# Patient Record
Sex: MALE | Race: BLACK OR AFRICAN AMERICAN | NOT HISPANIC OR LATINO
[De-identification: names, ages, dates, MRNs, and addresses within clinical notes are randomized per-mention and may not be internally consistent; named-entity substitution may affect disease eponyms.]

---

## 2017-04-24 ENCOUNTER — APPOINTMENT (OUTPATIENT)
Dept: INTERNAL MEDICINE | Facility: CLINIC | Age: 62
End: 2017-04-24

## 2017-04-24 VITALS
OXYGEN SATURATION: 97 % | HEART RATE: 81 BPM | DIASTOLIC BLOOD PRESSURE: 86 MMHG | BODY MASS INDEX: 33.46 KG/M2 | RESPIRATION RATE: 14 BRPM | HEIGHT: 71 IN | SYSTOLIC BLOOD PRESSURE: 139 MMHG | WEIGHT: 239 LBS | TEMPERATURE: 98.1 F

## 2017-04-24 DIAGNOSIS — R10.9 UNSPECIFIED ABDOMINAL PAIN: ICD-10-CM

## 2017-04-25 LAB
ALBUMIN SERPL ELPH-MCNC: 4.2 G/DL
ALP BLD-CCNC: 60 U/L
ALT SERPL-CCNC: 17 U/L
ANION GAP SERPL CALC-SCNC: 18 MMOL/L
AST SERPL-CCNC: 18 U/L
BASOPHILS # BLD AUTO: 0.07 K/UL
BASOPHILS NFR BLD AUTO: 1.1 %
BILIRUB SERPL-MCNC: 0.6 MG/DL
BUN SERPL-MCNC: 18 MG/DL
CALCIUM SERPL-MCNC: 9.5 MG/DL
CHLORIDE SERPL-SCNC: 101 MMOL/L
CHOLEST SERPL-MCNC: 156 MG/DL
CHOLEST/HDLC SERPL: 3.5 RATIO
CO2 SERPL-SCNC: 25 MMOL/L
CREAT SERPL-MCNC: 0.83 MG/DL
EOSINOPHIL # BLD AUTO: 0.13 K/UL
EOSINOPHIL NFR BLD AUTO: 2 %
GLUCOSE SERPL-MCNC: 80 MG/DL
HBA1C MFR BLD HPLC: 5.9 %
HCT VFR BLD CALC: 40.7 %
HDLC SERPL-MCNC: 45 MG/DL
HGB BLD-MCNC: 14.1 G/DL
IMM GRANULOCYTES NFR BLD AUTO: 0.2 %
LDLC SERPL CALC-MCNC: 96 MG/DL
LYMPHOCYTES # BLD AUTO: 2.84 K/UL
LYMPHOCYTES NFR BLD AUTO: 43.6 %
MAN DIFF?: NORMAL
MCHC RBC-ENTMCNC: 31.4 PG
MCHC RBC-ENTMCNC: 34.6 GM/DL
MCV RBC AUTO: 90.6 FL
MONOCYTES # BLD AUTO: 0.6 K/UL
MONOCYTES NFR BLD AUTO: 9.2 %
NEUTROPHILS # BLD AUTO: 2.87 K/UL
NEUTROPHILS NFR BLD AUTO: 43.9 %
PLATELET # BLD AUTO: 400 K/UL
POTASSIUM SERPL-SCNC: 4.4 MMOL/L
PROT SERPL-MCNC: 7.7 G/DL
RBC # BLD: 4.49 M/UL
RBC # FLD: 16.6 %
SODIUM SERPL-SCNC: 144 MMOL/L
TRIGL SERPL-MCNC: 74 MG/DL
TSH SERPL-ACNC: 1.35 UIU/ML
WBC # FLD AUTO: 6.52 K/UL

## 2017-06-13 ENCOUNTER — APPOINTMENT (OUTPATIENT)
Dept: INTERNAL MEDICINE | Facility: CLINIC | Age: 62
End: 2017-06-13

## 2017-08-23 ENCOUNTER — APPOINTMENT (OUTPATIENT)
Dept: INTERNAL MEDICINE | Facility: CLINIC | Age: 62
End: 2017-08-23
Payer: MEDICARE

## 2017-08-23 VITALS
DIASTOLIC BLOOD PRESSURE: 86 MMHG | SYSTOLIC BLOOD PRESSURE: 168 MMHG | BODY MASS INDEX: 37.38 KG/M2 | RESPIRATION RATE: 14 BRPM | WEIGHT: 267 LBS | TEMPERATURE: 98.5 F | OXYGEN SATURATION: 97 % | HEIGHT: 71 IN | HEART RATE: 82 BPM

## 2017-08-23 PROCEDURE — 99214 OFFICE O/P EST MOD 30 MIN: CPT | Mod: 25

## 2017-08-23 PROCEDURE — 36415 COLL VENOUS BLD VENIPUNCTURE: CPT

## 2017-08-24 LAB
ALBUMIN SERPL ELPH-MCNC: 4.1 G/DL
ALP BLD-CCNC: 59 U/L
ALT SERPL-CCNC: 23 U/L
ANION GAP SERPL CALC-SCNC: 15 MMOL/L
AST SERPL-CCNC: 23 U/L
BILIRUB SERPL-MCNC: 0.4 MG/DL
BUN SERPL-MCNC: 10 MG/DL
CALCIUM SERPL-MCNC: 9.3 MG/DL
CHLORIDE SERPL-SCNC: 104 MMOL/L
CHOLEST SERPL-MCNC: 197 MG/DL
CHOLEST/HDLC SERPL: 4.4 RATIO
CO2 SERPL-SCNC: 26 MMOL/L
CREAT SERPL-MCNC: 1.11 MG/DL
GLUCOSE SERPL-MCNC: 93 MG/DL
HBA1C MFR BLD HPLC: 6.2 %
HDLC SERPL-MCNC: 45 MG/DL
LDLC SERPL CALC-MCNC: 98 MG/DL
POTASSIUM SERPL-SCNC: 4.7 MMOL/L
PROT SERPL-MCNC: 7.1 G/DL
SODIUM SERPL-SCNC: 145 MMOL/L
TRIGL SERPL-MCNC: 270 MG/DL

## 2017-09-11 ENCOUNTER — APPOINTMENT (OUTPATIENT)
Dept: INTERNAL MEDICINE | Facility: CLINIC | Age: 62
End: 2017-09-11
Payer: MEDICARE

## 2017-09-11 VITALS
HEIGHT: 71 IN | DIASTOLIC BLOOD PRESSURE: 85 MMHG | TEMPERATURE: 98.6 F | RESPIRATION RATE: 16 BRPM | BODY MASS INDEX: 36.96 KG/M2 | HEART RATE: 75 BPM | SYSTOLIC BLOOD PRESSURE: 140 MMHG | WEIGHT: 264 LBS | OXYGEN SATURATION: 98 %

## 2017-09-11 PROCEDURE — 99214 OFFICE O/P EST MOD 30 MIN: CPT

## 2017-11-10 ENCOUNTER — OTHER (OUTPATIENT)
Age: 62
End: 2017-11-10

## 2017-12-04 ENCOUNTER — APPOINTMENT (OUTPATIENT)
Dept: INTERNAL MEDICINE | Facility: CLINIC | Age: 62
End: 2017-12-04
Payer: MEDICARE

## 2017-12-04 VITALS
RESPIRATION RATE: 18 BRPM | SYSTOLIC BLOOD PRESSURE: 133 MMHG | HEIGHT: 71 IN | TEMPERATURE: 98.2 F | DIASTOLIC BLOOD PRESSURE: 83 MMHG | HEART RATE: 85 BPM | OXYGEN SATURATION: 98 % | WEIGHT: 252 LBS | BODY MASS INDEX: 35.28 KG/M2

## 2017-12-04 PROCEDURE — 36415 COLL VENOUS BLD VENIPUNCTURE: CPT

## 2017-12-04 PROCEDURE — 99214 OFFICE O/P EST MOD 30 MIN: CPT | Mod: 25

## 2017-12-07 LAB
ANION GAP SERPL CALC-SCNC: 19 MMOL/L
BUN SERPL-MCNC: 19 MG/DL
CALCIUM SERPL-MCNC: 10.6 MG/DL
CHLORIDE SERPL-SCNC: 92 MMOL/L
CO2 SERPL-SCNC: 29 MMOL/L
CREAT SERPL-MCNC: 1.47 MG/DL
CRP SERPL-MCNC: 1.5 MG/DL
ERYTHROCYTE [SEDIMENTATION RATE] IN BLOOD BY WESTERGREN METHOD: 21 MM/HR
GLUCOSE SERPL-MCNC: 148 MG/DL
HBA1C MFR BLD HPLC: 6.4 %
POTASSIUM SERPL-SCNC: 3.8 MMOL/L
SODIUM SERPL-SCNC: 140 MMOL/L

## 2018-01-16 ENCOUNTER — APPOINTMENT (OUTPATIENT)
Dept: INTERNAL MEDICINE | Facility: CLINIC | Age: 63
End: 2018-01-16
Payer: MEDICARE

## 2018-01-16 VITALS
HEIGHT: 71 IN | BODY MASS INDEX: 35.42 KG/M2 | WEIGHT: 253 LBS | DIASTOLIC BLOOD PRESSURE: 91 MMHG | OXYGEN SATURATION: 98 % | SYSTOLIC BLOOD PRESSURE: 139 MMHG | TEMPERATURE: 98.3 F | HEART RATE: 98 BPM | RESPIRATION RATE: 16 BRPM

## 2018-01-16 DIAGNOSIS — R07.0 PAIN IN THROAT: ICD-10-CM

## 2018-01-16 DIAGNOSIS — R79.89 OTHER SPECIFIED ABNORMAL FINDINGS OF BLOOD CHEMISTRY: ICD-10-CM

## 2018-01-16 PROCEDURE — 36415 COLL VENOUS BLD VENIPUNCTURE: CPT

## 2018-01-16 PROCEDURE — 99214 OFFICE O/P EST MOD 30 MIN: CPT | Mod: 25

## 2018-01-18 ENCOUNTER — OTHER (OUTPATIENT)
Age: 63
End: 2018-01-18

## 2018-01-18 LAB
ALBUMIN SERPL ELPH-MCNC: 4.4 G/DL
ALP BLD-CCNC: 84 U/L
ALT SERPL-CCNC: 23 U/L
ANION GAP SERPL CALC-SCNC: 24 MMOL/L
AST SERPL-CCNC: 24 U/L
BILIRUB SERPL-MCNC: 0.6 MG/DL
BUN SERPL-MCNC: 11 MG/DL
CALCIUM SERPL-MCNC: 11.2 MG/DL
CHLORIDE SERPL-SCNC: 102 MMOL/L
CO2 SERPL-SCNC: 25 MMOL/L
CREAT SERPL-MCNC: 1.13 MG/DL
GLUCOSE SERPL-MCNC: 105 MG/DL
POTASSIUM SERPL-SCNC: 4.3 MMOL/L
PROT SERPL-MCNC: 8.5 G/DL
RHEUMATOID FACT SER QL: 8 IU/ML
SODIUM SERPL-SCNC: 151 MMOL/L

## 2018-01-19 LAB
ANA PAT FLD IF-IMP: ABNORMAL
ANA SER IF-ACNC: ABNORMAL

## 2018-02-20 ENCOUNTER — APPOINTMENT (OUTPATIENT)
Dept: INTERNAL MEDICINE | Facility: CLINIC | Age: 63
End: 2018-02-20
Payer: MEDICARE

## 2018-02-20 VITALS
BODY MASS INDEX: 36.26 KG/M2 | HEART RATE: 98 BPM | TEMPERATURE: 99 F | OXYGEN SATURATION: 96 % | SYSTOLIC BLOOD PRESSURE: 163 MMHG | HEIGHT: 71 IN | WEIGHT: 259 LBS | DIASTOLIC BLOOD PRESSURE: 101 MMHG

## 2018-02-20 VITALS — SYSTOLIC BLOOD PRESSURE: 160 MMHG | DIASTOLIC BLOOD PRESSURE: 94 MMHG

## 2018-02-20 PROCEDURE — 36415 COLL VENOUS BLD VENIPUNCTURE: CPT

## 2018-02-20 PROCEDURE — 99214 OFFICE O/P EST MOD 30 MIN: CPT | Mod: 25

## 2018-02-22 ENCOUNTER — OTHER (OUTPATIENT)
Age: 63
End: 2018-02-22

## 2018-02-22 LAB
ALBUMIN SERPL ELPH-MCNC: 4.3 G/DL
ALP BLD-CCNC: 65 U/L
ALT SERPL-CCNC: 17 U/L
ANION GAP SERPL CALC-SCNC: 20 MMOL/L
AST SERPL-CCNC: 19 U/L
BILIRUB SERPL-MCNC: 0.4 MG/DL
BUN SERPL-MCNC: 13 MG/DL
CALCIUM SERPL-MCNC: 10.5 MG/DL
CHLORIDE SERPL-SCNC: 100 MMOL/L
CHOLEST SERPL-MCNC: 278 MG/DL
CHOLEST/HDLC SERPL: 5.9 RATIO
CO2 SERPL-SCNC: 24 MMOL/L
CREAT SERPL-MCNC: 1.1 MG/DL
GLUCOSE SERPL-MCNC: 106 MG/DL
HBA1C MFR BLD HPLC: 6.3 %
HDLC SERPL-MCNC: 47 MG/DL
LDLC SERPL CALC-MCNC: 201 MG/DL
POTASSIUM SERPL-SCNC: 4.5 MMOL/L
PROT SERPL-MCNC: 8 G/DL
SODIUM SERPL-SCNC: 144 MMOL/L
TRIGL SERPL-MCNC: 151 MG/DL

## 2018-05-01 ENCOUNTER — OTHER (OUTPATIENT)
Age: 63
End: 2018-05-01

## 2018-05-30 ENCOUNTER — OTHER (OUTPATIENT)
Age: 63
End: 2018-05-30

## 2018-06-13 ENCOUNTER — APPOINTMENT (OUTPATIENT)
Dept: INTERNAL MEDICINE | Facility: CLINIC | Age: 63
End: 2018-06-13

## 2018-07-26 ENCOUNTER — OTHER (OUTPATIENT)
Age: 63
End: 2018-07-26

## 2018-07-26 DIAGNOSIS — N39.3 STRESS INCONTINENCE (FEMALE) (MALE): ICD-10-CM

## 2018-08-22 ENCOUNTER — RX RENEWAL (OUTPATIENT)
Age: 63
End: 2018-08-22

## 2018-08-29 ENCOUNTER — FORM ENCOUNTER (OUTPATIENT)
Age: 63
End: 2018-08-29

## 2018-08-29 ENCOUNTER — APPOINTMENT (OUTPATIENT)
Dept: INTERNAL MEDICINE | Facility: CLINIC | Age: 63
End: 2018-08-29
Payer: MEDICARE

## 2018-08-29 VITALS
DIASTOLIC BLOOD PRESSURE: 95 MMHG | SYSTOLIC BLOOD PRESSURE: 152 MMHG | HEART RATE: 87 BPM | TEMPERATURE: 98.9 F | HEIGHT: 71 IN | BODY MASS INDEX: 35.28 KG/M2 | OXYGEN SATURATION: 96 % | WEIGHT: 252 LBS

## 2018-08-29 PROCEDURE — 99215 OFFICE O/P EST HI 40 MIN: CPT | Mod: 25

## 2018-08-29 PROCEDURE — 93000 ELECTROCARDIOGRAM COMPLETE: CPT

## 2018-08-29 PROCEDURE — 36415 COLL VENOUS BLD VENIPUNCTURE: CPT

## 2018-08-30 ENCOUNTER — OUTPATIENT (OUTPATIENT)
Dept: OUTPATIENT SERVICES | Facility: HOSPITAL | Age: 63
LOS: 1 days | End: 2018-08-30
Payer: MEDICARE

## 2018-08-30 LAB
ALBUMIN SERPL ELPH-MCNC: 4.6 G/DL
ALP BLD-CCNC: 73 U/L
ALT SERPL-CCNC: 17 U/L
ANION GAP SERPL CALC-SCNC: 15 MMOL/L
AST SERPL-CCNC: 14 U/L
BASOPHILS # BLD AUTO: 0.05 K/UL
BASOPHILS NFR BLD AUTO: 0.8 %
BILIRUB SERPL-MCNC: 0.7 MG/DL
BUN SERPL-MCNC: 17 MG/DL
CALCIUM SERPL-MCNC: 10.4 MG/DL
CHLORIDE SERPL-SCNC: 92 MMOL/L
CHOLEST SERPL-MCNC: 163 MG/DL
CHOLEST/HDLC SERPL: 3.8 RATIO
CO2 SERPL-SCNC: 31 MMOL/L
CREAT SERPL-MCNC: 1.03 MG/DL
EOSINOPHIL # BLD AUTO: 0.12 K/UL
EOSINOPHIL NFR BLD AUTO: 1.8 %
GLUCOSE SERPL-MCNC: 88 MG/DL
HBA1C MFR BLD HPLC: 6.6 %
HCT VFR BLD CALC: 45.4 %
HDLC SERPL-MCNC: 43 MG/DL
HGB BLD-MCNC: 15.2 G/DL
IMM GRANULOCYTES NFR BLD AUTO: 0.2 %
LDLC SERPL CALC-MCNC: 93 MG/DL
LYMPHOCYTES # BLD AUTO: 3.33 K/UL
LYMPHOCYTES NFR BLD AUTO: 51.2 %
MAN DIFF?: NORMAL
MCHC RBC-ENTMCNC: 30.7 PG
MCHC RBC-ENTMCNC: 33.5 GM/DL
MCV RBC AUTO: 91.7 FL
MONOCYTES # BLD AUTO: 0.65 K/UL
MONOCYTES NFR BLD AUTO: 10 %
NEUTROPHILS # BLD AUTO: 2.34 K/UL
NEUTROPHILS NFR BLD AUTO: 36 %
PLATELET # BLD AUTO: 387 K/UL
POTASSIUM SERPL-SCNC: 3.9 MMOL/L
PROT SERPL-MCNC: 8.3 G/DL
RBC # BLD: 4.95 M/UL
RBC # FLD: 16.3 %
SODIUM SERPL-SCNC: 138 MMOL/L
TRIGL SERPL-MCNC: 135 MG/DL
WBC # FLD AUTO: 6.5 K/UL

## 2018-08-30 PROCEDURE — 71046 X-RAY EXAM CHEST 2 VIEWS: CPT

## 2018-08-30 PROCEDURE — 71046 X-RAY EXAM CHEST 2 VIEWS: CPT | Mod: 26

## 2018-09-06 ENCOUNTER — EMERGENCY (EMERGENCY)
Facility: HOSPITAL | Age: 63
LOS: 1 days | Discharge: HOME | End: 2018-09-06
Attending: EMERGENCY MEDICINE | Admitting: EMERGENCY MEDICINE
Payer: OTHER MISCELLANEOUS

## 2018-09-06 VITALS
OXYGEN SATURATION: 96 % | HEART RATE: 105 BPM | TEMPERATURE: 98 F | RESPIRATION RATE: 18 BRPM | SYSTOLIC BLOOD PRESSURE: 174 MMHG | WEIGHT: 259.04 LBS | DIASTOLIC BLOOD PRESSURE: 111 MMHG

## 2018-09-06 VITALS
OXYGEN SATURATION: 97 % | DIASTOLIC BLOOD PRESSURE: 93 MMHG | TEMPERATURE: 99 F | HEART RATE: 94 BPM | RESPIRATION RATE: 18 BRPM | SYSTOLIC BLOOD PRESSURE: 158 MMHG

## 2018-09-06 DIAGNOSIS — M10.9 GOUT, UNSPECIFIED: ICD-10-CM

## 2018-09-06 DIAGNOSIS — M79.641 PAIN IN RIGHT HAND: ICD-10-CM

## 2018-09-06 DIAGNOSIS — E78.5 HYPERLIPIDEMIA, UNSPECIFIED: ICD-10-CM

## 2018-09-06 DIAGNOSIS — I10 ESSENTIAL (PRIMARY) HYPERTENSION: ICD-10-CM

## 2018-09-06 DIAGNOSIS — I25.10 ATHEROSCLEROTIC HEART DISEASE OF NATIVE CORONARY ARTERY WITHOUT ANGINA PECTORIS: ICD-10-CM

## 2018-09-06 DIAGNOSIS — Z79.899 OTHER LONG TERM (CURRENT) DRUG THERAPY: ICD-10-CM

## 2018-09-06 DIAGNOSIS — E11.9 TYPE 2 DIABETES MELLITUS WITHOUT COMPLICATIONS: ICD-10-CM

## 2018-09-06 PROCEDURE — 99284 EMERGENCY DEPT VISIT MOD MDM: CPT

## 2018-09-06 PROCEDURE — 73110 X-RAY EXAM OF WRIST: CPT | Mod: 26,RT

## 2018-09-06 PROCEDURE — 73130 X-RAY EXAM OF HAND: CPT | Mod: 26,RT

## 2018-09-06 PROCEDURE — 73130 X-RAY EXAM OF HAND: CPT

## 2018-09-06 PROCEDURE — 73110 X-RAY EXAM OF WRIST: CPT

## 2018-09-06 RX ORDER — IBUPROFEN 200 MG
1 TABLET ORAL
Qty: 21 | Refills: 0
Start: 2018-09-06 | End: 2018-09-12

## 2018-09-06 RX ORDER — IBUPROFEN 200 MG
800 TABLET ORAL ONCE
Qty: 0 | Refills: 0 | Status: COMPLETED | OUTPATIENT
Start: 2018-09-06 | End: 2018-09-06

## 2018-09-06 RX ORDER — OXYCODONE AND ACETAMINOPHEN 5; 325 MG/1; MG/1
1 TABLET ORAL ONCE
Qty: 0 | Refills: 0 | Status: DISCONTINUED | OUTPATIENT
Start: 2018-09-06 | End: 2018-09-06

## 2018-09-06 RX ORDER — TRAMADOL HYDROCHLORIDE 50 MG/1
1 TABLET ORAL
Qty: 14 | Refills: 0
Start: 2018-09-06 | End: 2018-09-12

## 2018-09-06 RX ADMIN — OXYCODONE AND ACETAMINOPHEN 1 TABLET(S): 5; 325 TABLET ORAL at 11:44

## 2018-09-06 RX ADMIN — Medication 800 MILLIGRAM(S): at 11:44

## 2018-09-06 NOTE — ED ADULT NURSE NOTE - PMH
Atherosclerosis of coronary artery  s/p stent in 2009 and angioplasty in 2010  Essential hypertension  Hypertension  Gout  Gout  Hyperlipidemia  Hyperlipidemia  Osteoarthritis  Osteoarthritis  Type 2 diabetes mellitus  DM2 (diabetes mellitus, type 2)

## 2018-09-06 NOTE — ED ADULT NURSE NOTE - NSIMPLEMENTINTERV_GEN_ALL_ED
Implemented All Universal Safety Interventions:  [place] to call system. Call bell, personal items and telephone within reach. Instruct patient to call for assistance. Room bathroom lighting operational. Non-slip footwear when patient is off stretcher. Physically safe environment: no spills, clutter or unnecessary equipment. Stretcher in lowest position, wheels locked, appropriate side rails in place.

## 2018-09-06 NOTE — ED ADULT TRIAGE NOTE - CHIEF COMPLAINT QUOTE
pt c/o right hand pain  for 3 days. pt states " I think I have gout from work" area noted swollen. cap refill less than 2 sec. + radial pulse. Pt noted Hypertensive on triage EKG in progress. reports not taking his medications this morning

## 2018-09-06 NOTE — ED PROVIDER NOTE - ATTENDING CONTRIBUTION TO CARE
I have seen the pt and reviewed all pertinent clinical data. I agree with the documentation/care/plan executed by COREY Hagen.

## 2018-09-06 NOTE — ED PROVIDER NOTE - MEDICAL DECISION MAKING DETAILS
64 yo male with h/o HTN, DM, gout, HLD, in the ER due to acute right hand pain x 2 days. Pt reports its typical for his gout flare pain. Pt afebrile, has normal sensation and good pulses to right UE. initially elevated BP on the triage- pt didn't take his meds this morning and came to Er because his pain was not controlled. Pt has no other complaints. No clinical suspicion for cellulitis. Plan : pain control d/c home for out pt care and f/u with PMD/rheumatologist. pt agree with a treatment plan.

## 2018-09-06 NOTE — ED PROVIDER NOTE - MUSCULOSKELETAL, MLM
right wrist/hand -dorsal aspect edema, tenderness to palpation, minimal warmth, not much erythema appreciated , cap. refill brisks, sensations intact, good distal pulses +

## 2018-09-06 NOTE — ED ADULT NURSE NOTE - OBJECTIVE STATEMENT
Pt presents to ED c/o R atraumatic hand pain x3 days with swelling, Hx gout. No fevers/chills, no open skin, sensation in tact. Pt hypertensive in triage, did not take medications this morning, no HA, dizziness/lightheadedness, visual changes, CP, SOB. Pt presents in NAD speaking full sentences ambulatory through triage.

## 2018-09-06 NOTE — ED PROVIDER NOTE - OBJECTIVE STATEMENT
64 yo male with h/o HTN, gout, DM, HLD, in the ER c/o right hand pain and swelling since yesterday. Pt reports its typical for his gout flair pain.

## 2018-09-09 ENCOUNTER — FORM ENCOUNTER (OUTPATIENT)
Age: 63
End: 2018-09-09

## 2018-09-10 ENCOUNTER — OUTPATIENT (OUTPATIENT)
Dept: OUTPATIENT SERVICES | Facility: HOSPITAL | Age: 63
LOS: 1 days | End: 2018-09-10
Payer: MEDICARE

## 2018-09-10 DIAGNOSIS — R07.89 OTHER CHEST PAIN: ICD-10-CM

## 2018-09-10 PROCEDURE — 78452 HT MUSCLE IMAGE SPECT MULT: CPT

## 2018-09-10 PROCEDURE — 93017 CV STRESS TEST TRACING ONLY: CPT

## 2018-09-10 PROCEDURE — 78452 HT MUSCLE IMAGE SPECT MULT: CPT | Mod: 26

## 2018-09-10 PROCEDURE — A9500: CPT

## 2018-09-10 PROCEDURE — 93018 CV STRESS TEST I&R ONLY: CPT

## 2018-09-10 PROCEDURE — 93016 CV STRESS TEST SUPVJ ONLY: CPT

## 2018-09-12 ENCOUNTER — OTHER (OUTPATIENT)
Age: 63
End: 2018-09-12

## 2018-09-21 ENCOUNTER — APPOINTMENT (OUTPATIENT)
Dept: HEART AND VASCULAR | Facility: CLINIC | Age: 63
End: 2018-09-21
Payer: MEDICARE

## 2018-09-21 VITALS
HEIGHT: 69.29 IN | DIASTOLIC BLOOD PRESSURE: 90 MMHG | WEIGHT: 250 LBS | TEMPERATURE: 97.9 F | SYSTOLIC BLOOD PRESSURE: 170 MMHG | HEART RATE: 101 BPM | OXYGEN SATURATION: 98 % | BODY MASS INDEX: 36.61 KG/M2

## 2018-09-21 PROCEDURE — 93000 ELECTROCARDIOGRAM COMPLETE: CPT

## 2018-09-21 PROCEDURE — G0008: CPT

## 2018-09-21 PROCEDURE — 99204 OFFICE O/P NEW MOD 45 MIN: CPT | Mod: 25

## 2018-09-21 PROCEDURE — 90662 IIV NO PRSV INCREASED AG IM: CPT

## 2018-09-21 RX ORDER — HYDROCHLOROTHIAZIDE 25 MG/1
25 TABLET ORAL
Qty: 30 | Refills: 0 | Status: DISCONTINUED | COMMUNITY
Start: 2018-08-22 | End: 2018-09-21

## 2018-09-21 RX ORDER — IBUPROFEN 800 MG/1
800 TABLET, FILM COATED ORAL
Qty: 21 | Refills: 0 | Status: DISCONTINUED | COMMUNITY
Start: 2018-09-06 | End: 2018-09-21

## 2018-10-03 ENCOUNTER — LABORATORY RESULT (OUTPATIENT)
Age: 63
End: 2018-10-03

## 2018-10-03 ENCOUNTER — APPOINTMENT (OUTPATIENT)
Dept: INTERNAL MEDICINE | Facility: CLINIC | Age: 63
End: 2018-10-03
Payer: MEDICARE

## 2018-10-03 VITALS
HEART RATE: 102 BPM | BODY MASS INDEX: 36.61 KG/M2 | WEIGHT: 250 LBS | HEIGHT: 69.29 IN | SYSTOLIC BLOOD PRESSURE: 138 MMHG | OXYGEN SATURATION: 95 % | DIASTOLIC BLOOD PRESSURE: 93 MMHG | TEMPERATURE: 98.4 F

## 2018-10-03 PROCEDURE — 36415 COLL VENOUS BLD VENIPUNCTURE: CPT

## 2018-10-03 PROCEDURE — 99214 OFFICE O/P EST MOD 30 MIN: CPT | Mod: 25

## 2018-10-10 ENCOUNTER — APPOINTMENT (OUTPATIENT)
Dept: HEART AND VASCULAR | Facility: CLINIC | Age: 63
End: 2018-10-10
Payer: MEDICARE

## 2018-10-11 LAB
ALBUMIN SERPL ELPH-MCNC: 4.4 G/DL
ALP BLD-CCNC: 81 U/L
ALT SERPL-CCNC: 20 U/L
ANION GAP SERPL CALC-SCNC: 21 MMOL/L
APPEARANCE: CLEAR
APTT BLD: 34.4 SEC
AST SERPL-CCNC: 19 U/L
BASOPHILS # BLD AUTO: 0.04 K/UL
BASOPHILS NFR BLD AUTO: 0.5 %
BILIRUB SERPL-MCNC: 0.8 MG/DL
BILIRUBIN URINE: NEGATIVE
BLOOD URINE: ABNORMAL
BUN SERPL-MCNC: 12 MG/DL
CALCIUM SERPL-MCNC: 9.7 MG/DL
CHLORIDE SERPL-SCNC: 96 MMOL/L
CO2 SERPL-SCNC: 21 MMOL/L
COLOR: YELLOW
CREAT SERPL-MCNC: 0.98 MG/DL
EOSINOPHIL # BLD AUTO: 0.08 K/UL
EOSINOPHIL NFR BLD AUTO: 1 %
GLUCOSE QUALITATIVE U: NEGATIVE MG/DL
GLUCOSE SERPL-MCNC: 94 MG/DL
HCT VFR BLD CALC: 43.9 %
HGB BLD-MCNC: 14.5 G/DL
IMM GRANULOCYTES NFR BLD AUTO: 0.4 %
INR PPP: 1.07 RATIO
KETONES URINE: NEGATIVE
LEUKOCYTE ESTERASE URINE: NEGATIVE
LYMPHOCYTES # BLD AUTO: 3.72 K/UL
LYMPHOCYTES NFR BLD AUTO: 48.1 %
MAN DIFF?: NORMAL
MCHC RBC-ENTMCNC: 29.5 PG
MCHC RBC-ENTMCNC: 33 GM/DL
MCV RBC AUTO: 89.4 FL
MONOCYTES # BLD AUTO: 0.75 K/UL
MONOCYTES NFR BLD AUTO: 9.7 %
NEUTROPHILS # BLD AUTO: 3.11 K/UL
NEUTROPHILS NFR BLD AUTO: 40.3 %
NITRITE URINE: NEGATIVE
PH URINE: 5
PLATELET # BLD AUTO: 434 K/UL
POTASSIUM SERPL-SCNC: 3.6 MMOL/L
PROT SERPL-MCNC: 8.4 G/DL
PROTEIN URINE: NEGATIVE MG/DL
PT BLD: 12.1 SEC
RBC # BLD: 4.91 M/UL
RBC # FLD: 15.9 %
SODIUM SERPL-SCNC: 138 MMOL/L
SPECIFIC GRAVITY URINE: 1.01
UROBILINOGEN URINE: NEGATIVE MG/DL
WBC # FLD AUTO: 7.73 K/UL

## 2018-10-24 ENCOUNTER — APPOINTMENT (OUTPATIENT)
Dept: HEART AND VASCULAR | Facility: CLINIC | Age: 63
End: 2018-10-24
Payer: MEDICARE

## 2018-10-24 VITALS
OXYGEN SATURATION: 97 % | WEIGHT: 251 LBS | BODY MASS INDEX: 36.75 KG/M2 | SYSTOLIC BLOOD PRESSURE: 144 MMHG | DIASTOLIC BLOOD PRESSURE: 87 MMHG | HEIGHT: 69.29 IN | TEMPERATURE: 98.6 F | HEART RATE: 89 BPM

## 2018-10-24 PROCEDURE — 93306 TTE W/DOPPLER COMPLETE: CPT

## 2018-10-24 PROCEDURE — 99214 OFFICE O/P EST MOD 30 MIN: CPT

## 2018-10-24 PROCEDURE — 93000 ELECTROCARDIOGRAM COMPLETE: CPT

## 2018-10-24 RX ORDER — TRAMADOL HYDROCHLORIDE 50 MG/1
50 TABLET, COATED ORAL
Qty: 14 | Refills: 0 | Status: DISCONTINUED | COMMUNITY
Start: 2018-09-06 | End: 2018-10-24

## 2018-10-24 RX ORDER — FLUTICASONE PROPIONATE 50 UG/1
50 SPRAY, METERED NASAL TWICE DAILY
Qty: 1 | Refills: 5 | Status: DISCONTINUED | COMMUNITY
Start: 2018-01-16 | End: 2018-10-24

## 2018-10-30 ENCOUNTER — APPOINTMENT (OUTPATIENT)
Dept: HEART AND VASCULAR | Facility: CLINIC | Age: 63
End: 2018-10-30
Payer: MEDICARE

## 2018-10-30 VITALS
SYSTOLIC BLOOD PRESSURE: 118 MMHG | HEIGHT: 69 IN | TEMPERATURE: 97.8 F | WEIGHT: 251 LBS | BODY MASS INDEX: 37.18 KG/M2 | HEART RATE: 94 BPM | DIASTOLIC BLOOD PRESSURE: 82 MMHG | OXYGEN SATURATION: 93 %

## 2018-10-30 DIAGNOSIS — Z01.818 ENCOUNTER FOR OTHER PREPROCEDURAL EXAMINATION: ICD-10-CM

## 2018-10-30 PROCEDURE — 99214 OFFICE O/P EST MOD 30 MIN: CPT

## 2018-11-06 ENCOUNTER — FORM ENCOUNTER (OUTPATIENT)
Age: 63
End: 2018-11-06

## 2018-11-07 ENCOUNTER — APPOINTMENT (OUTPATIENT)
Dept: MRI IMAGING | Facility: HOSPITAL | Age: 63
End: 2018-11-07
Payer: MEDICARE

## 2018-11-07 ENCOUNTER — OUTPATIENT (OUTPATIENT)
Dept: OUTPATIENT SERVICES | Facility: HOSPITAL | Age: 63
LOS: 1 days | End: 2018-11-07
Payer: MEDICARE

## 2018-11-07 PROCEDURE — 75561 CARDIAC MRI FOR MORPH W/DYE: CPT

## 2018-11-07 PROCEDURE — A9577: CPT

## 2018-11-07 PROCEDURE — 75561 CARDIAC MRI FOR MORPH W/DYE: CPT | Mod: 26

## 2018-11-13 ENCOUNTER — APPOINTMENT (OUTPATIENT)
Dept: MRI IMAGING | Facility: CLINIC | Age: 63
End: 2018-11-13

## 2018-11-26 ENCOUNTER — OTHER (OUTPATIENT)
Age: 63
End: 2018-11-26

## 2018-12-03 ENCOUNTER — RX RENEWAL (OUTPATIENT)
Age: 63
End: 2018-12-03

## 2018-12-13 ENCOUNTER — NON-APPOINTMENT (OUTPATIENT)
Age: 63
End: 2018-12-13

## 2018-12-13 ENCOUNTER — APPOINTMENT (OUTPATIENT)
Dept: HEART AND VASCULAR | Facility: CLINIC | Age: 63
End: 2018-12-13
Payer: MEDICARE

## 2018-12-13 VITALS
BODY MASS INDEX: 37.03 KG/M2 | DIASTOLIC BLOOD PRESSURE: 69 MMHG | SYSTOLIC BLOOD PRESSURE: 126 MMHG | HEIGHT: 69 IN | WEIGHT: 250 LBS | HEART RATE: 97 BPM

## 2018-12-13 PROCEDURE — 93000 ELECTROCARDIOGRAM COMPLETE: CPT

## 2018-12-13 PROCEDURE — 99204 OFFICE O/P NEW MOD 45 MIN: CPT | Mod: 25

## 2018-12-16 ENCOUNTER — MOBILE ON CALL (OUTPATIENT)
Age: 63
End: 2018-12-16

## 2018-12-17 ENCOUNTER — RX RENEWAL (OUTPATIENT)
Age: 63
End: 2018-12-17

## 2019-01-02 ENCOUNTER — APPOINTMENT (OUTPATIENT)
Dept: INTERNAL MEDICINE | Facility: CLINIC | Age: 64
End: 2019-01-02
Payer: MEDICARE

## 2019-01-02 VITALS
OXYGEN SATURATION: 96 % | WEIGHT: 250 LBS | TEMPERATURE: 98 F | BODY MASS INDEX: 37.03 KG/M2 | SYSTOLIC BLOOD PRESSURE: 150 MMHG | DIASTOLIC BLOOD PRESSURE: 92 MMHG | HEART RATE: 85 BPM | HEIGHT: 69 IN

## 2019-01-02 VITALS — SYSTOLIC BLOOD PRESSURE: 140 MMHG | DIASTOLIC BLOOD PRESSURE: 90 MMHG

## 2019-01-02 PROCEDURE — 99214 OFFICE O/P EST MOD 30 MIN: CPT

## 2019-01-02 RX ORDER — HYDROCHLOROTHIAZIDE 12.5 MG/1
12.5 TABLET ORAL DAILY
Qty: 90 | Refills: 1 | Status: DISCONTINUED | COMMUNITY
Start: 2017-08-23 | End: 2019-01-02
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
## 2019-01-03 NOTE — PHYSICAL EXAM
[General Appearance - Well Developed] : well developed [Normal Appearance] : normal appearance [Well Groomed] : well groomed [General Appearance - Well Nourished] : well nourished [No Deformities] : no deformities [General Appearance - In No Acute Distress] : no acute distress [Normal Conjunctiva] : the conjunctiva exhibited no abnormalities [Eyelids - No Xanthelasma] : the eyelids demonstrated no xanthelasmas [Normal Oral Mucosa] : normal oral mucosa [No Oral Pallor] : no oral pallor [No Oral Cyanosis] : no oral cyanosis [Normal Jugular Venous A Waves Present] : normal jugular venous A waves present [Normal Jugular Venous V Waves Present] : normal jugular venous V waves present [No Jugular Venous Poon A Waves] : no jugular venous poon A waves [Heart Rate And Rhythm] : heart rate and rhythm were normal [Heart Sounds] : normal S1 and S2 [Murmurs] : no murmurs present [Respiration, Rhythm And Depth] : normal respiratory rhythm and effort [Exaggerated Use Of Accessory Muscles For Inspiration] : no accessory muscle use [Auscultation Breath Sounds / Voice Sounds] : lungs were clear to auscultation bilaterally [Abdomen Soft] : soft [Abdomen Tenderness] : non-tender [Abdomen Mass (___ Cm)] : no abdominal mass palpated [Abnormal Walk] : normal gait [Gait - Sufficient For Exercise Testing] : the gait was sufficient for exercise testing [Nail Clubbing] : no clubbing of the fingernails [Cyanosis, Localized] : no localized cyanosis [Petechial Hemorrhages (___cm)] : no petechial hemorrhages [] : no ischemic changes

## 2019-01-03 NOTE — DISCUSSION/SUMMARY
[FreeTextEntry1] : Mr. Sigala is a 63 year-old male with ischemic cardiomyopathy with an EF of 37% along with extensive scarring (including transmural scar of the inferior wall).  His episode of syncope is concerning in that he had no prodrome and I am concerned that it could have been VT-mediated.  I have offered him and EPS study to evaluate for the inducibility of  VT.  I have also offered him ICD implantation without an EP study.  This is not the standard of care, but there is growing evidence that males with Mr. Sigala' degree of lGE are at an increased risk for SCD and derive a significant mortality benefit from ICD implantation.  He will consider his options and return in 1 month to discuss.

## 2019-01-11 ENCOUNTER — OTHER (OUTPATIENT)
Age: 64
End: 2019-01-11

## 2019-01-14 ENCOUNTER — NON-APPOINTMENT (OUTPATIENT)
Age: 64
End: 2019-01-14

## 2019-01-14 ENCOUNTER — APPOINTMENT (OUTPATIENT)
Dept: HEART AND VASCULAR | Facility: CLINIC | Age: 64
End: 2019-01-14
Payer: MEDICARE

## 2019-01-14 VITALS
WEIGHT: 245 LBS | SYSTOLIC BLOOD PRESSURE: 134 MMHG | HEART RATE: 90 BPM | HEIGHT: 69 IN | DIASTOLIC BLOOD PRESSURE: 83 MMHG | BODY MASS INDEX: 36.29 KG/M2

## 2019-01-14 PROCEDURE — 93000 ELECTROCARDIOGRAM COMPLETE: CPT

## 2019-01-14 PROCEDURE — 99214 OFFICE O/P EST MOD 30 MIN: CPT | Mod: 25

## 2019-01-14 RX ORDER — ATORVASTATIN CALCIUM 40 MG/1
40 TABLET, FILM COATED ORAL
Qty: 90 | Refills: 0 | Status: DISCONTINUED | COMMUNITY
Start: 2018-02-22 | End: 2019-01-14

## 2019-01-14 NOTE — PHYSICAL EXAM
[General Appearance - Well Developed] : well developed [Normal Appearance] : normal appearance [Well Groomed] : well groomed [General Appearance - Well Nourished] : well nourished [No Deformities] : no deformities [General Appearance - In No Acute Distress] : no acute distress [Normal Conjunctiva] : the conjunctiva exhibited no abnormalities [Eyelids - No Xanthelasma] : the eyelids demonstrated no xanthelasmas [Normal Oral Mucosa] : normal oral mucosa [No Oral Pallor] : no oral pallor [No Oral Cyanosis] : no oral cyanosis [Normal Jugular Venous A Waves Present] : normal jugular venous A waves present [Normal Jugular Venous V Waves Present] : normal jugular venous V waves present [No Jugular Venous Poon A Waves] : no jugular venous poon A waves [Respiration, Rhythm And Depth] : normal respiratory rhythm and effort [Exaggerated Use Of Accessory Muscles For Inspiration] : no accessory muscle use [Auscultation Breath Sounds / Voice Sounds] : lungs were clear to auscultation bilaterally [Heart Rate And Rhythm] : heart rate and rhythm were normal [Heart Sounds] : normal S1 and S2 [Murmurs] : no murmurs present [Abdomen Soft] : soft [Abdomen Tenderness] : non-tender [Abdomen Mass (___ Cm)] : no abdominal mass palpated [Abnormal Walk] : normal gait [Gait - Sufficient For Exercise Testing] : the gait was sufficient for exercise testing [Nail Clubbing] : no clubbing of the fingernails [Cyanosis, Localized] : no localized cyanosis [Petechial Hemorrhages (___cm)] : no petechial hemorrhages [] : no ischemic changes

## 2019-01-18 ENCOUNTER — APPOINTMENT (OUTPATIENT)
Dept: ORTHOPEDIC SURGERY | Facility: CLINIC | Age: 64
End: 2019-01-18
Payer: OTHER MISCELLANEOUS

## 2019-01-18 DIAGNOSIS — Z78.9 OTHER SPECIFIED HEALTH STATUS: ICD-10-CM

## 2019-01-18 PROCEDURE — 73522 X-RAY EXAM HIPS BI 3-4 VIEWS: CPT

## 2019-01-18 PROCEDURE — 99214 OFFICE O/P EST MOD 30 MIN: CPT

## 2019-01-18 RX ORDER — AMLODIPINE BESYLATE 5 MG/1
5 TABLET ORAL
Qty: 30 | Refills: 0 | Status: DISCONTINUED | COMMUNITY
Start: 2018-08-29 | End: 2019-01-18

## 2019-01-18 NOTE — REASON FOR VISIT
[Follow-Up Visit] : a follow-up visit for [Artificial Hip Joint] : artificial hip joint [FreeTextEntry2] : left

## 2019-01-18 NOTE — HISTORY OF PRESENT ILLNESS
[Pain Location] : pain [] : left hip [Stable] : stable [Intermit.] : ~He/She~ states the symptoms seem to be intermittent [Bending] : worsened by bending [Sitting] : worsened by sitting [Walking] : worsened by walking [Acetaminophen] : relieved by acetaminophen [Heat] : relieved by heat [Ice] : relieved by ice [Physical Therapy] : relieved by physical therapy [de-identified] : 63 year old male s/p left total hip replacement in 2015 presents to the office today complaining of some pain while walking. The patient does physical therapy 3x a week.

## 2019-01-18 NOTE — ASSESSMENT
[FreeTextEntry1] : Patient is here for followup of his left total hip replacement he continues to have pain in the area of the hip but more so I think he is having low back pain as well. He is under the care of another doctor for his low back pain. He is also under the care of an orthopedic surgeon who has scheduled him for bilateral knee replacement with regards to his hip the pain is located just proximal to the tip of the greater trochanter. He continues to have hip abductor weakness as well the patient states that the physical therapist is reluctant to work the hip because it exacerbates his low back pain. I've reached out to the physical therapist to discuss this matter with him and am awaiting a phone call today so I can discuss it with him impression status post left total hip replacement with hip abductor weakness and discomfort along with more severe low back pain plan is to continue physical therapy for both the hip and the back and a followup in 3-6 months time

## 2019-01-18 NOTE — PHYSICAL EXAM
[de-identified] : Radiographs done on January 18, 2018 AP pelvis and lateral left hip show the bony structures intact no fractures or dislocations there is a well aligned cementless hip replacement with excellent interfaces about the cup and stem no evidence of loosening or wear impression stable her processes

## 2019-01-18 NOTE — REVIEW OF SYSTEMS
[Joint Pain] : joint pain [Joint Stiffness] : joint stiffness [Negative] : Heme/Lymph [Joint Swelling] : no joint swelling

## 2019-01-28 NOTE — DISCUSSION/SUMMARY
[FreeTextEntry1] : Mr. Sigala is a 63 year-old male with mixed cardiomyopathy with an EF of 37% along with extensive scarring (including transmural scar of the inferior wall).  His episode of syncope is concerning in that he had no prodrome and I am concerned that it could have been VT-mediated.  We reviewed his diagnoses and my recommendations extensively.  I have offered him and EPS study to evaluate for the inducibility of  VT.  I have also offered him ICD implantation without an EP study.  This is not the standard of care, but there is growing evidence that males with Mr. Sigala' degree of lGE are at an increased risk for SCD and derive a significant mortality benefit from ICD implantation.  He would like to get a second opinion at this point and defers any invasive procedures.  The risk of recurrent syncope resulting in trauma and sudden death were discussed with him in great detail and he verbalized understanding.  He was advised to get a second opinion within the next 1-2 weeks and call with any questions or concerns that may arise.
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
## 2019-03-14 ENCOUNTER — RX RENEWAL (OUTPATIENT)
Age: 64
End: 2019-03-14

## 2019-03-18 ENCOUNTER — APPOINTMENT (OUTPATIENT)
Dept: INTERNAL MEDICINE | Facility: CLINIC | Age: 64
End: 2019-03-18
Payer: MEDICARE

## 2019-03-18 VITALS
WEIGHT: 252 LBS | DIASTOLIC BLOOD PRESSURE: 88 MMHG | HEART RATE: 93 BPM | TEMPERATURE: 99 F | SYSTOLIC BLOOD PRESSURE: 134 MMHG | OXYGEN SATURATION: 97 % | HEIGHT: 69 IN | BODY MASS INDEX: 37.33 KG/M2

## 2019-03-18 DIAGNOSIS — Z23 ENCOUNTER FOR IMMUNIZATION: ICD-10-CM

## 2019-03-18 PROCEDURE — 99214 OFFICE O/P EST MOD 30 MIN: CPT | Mod: 25

## 2019-03-18 PROCEDURE — G0009: CPT

## 2019-03-18 PROCEDURE — 90670 PCV13 VACCINE IM: CPT

## 2019-03-18 PROCEDURE — 36415 COLL VENOUS BLD VENIPUNCTURE: CPT

## 2019-03-19 LAB
ALBUMIN SERPL ELPH-MCNC: 4.1 G/DL
ALP BLD-CCNC: 70 U/L
ALT SERPL-CCNC: 13 U/L
ANION GAP SERPL CALC-SCNC: 16 MMOL/L
AST SERPL-CCNC: 15 U/L
BILIRUB SERPL-MCNC: 0.7 MG/DL
BUN SERPL-MCNC: 12 MG/DL
CALCIUM SERPL-MCNC: 9.4 MG/DL
CHLORIDE SERPL-SCNC: 96 MMOL/L
CHOLEST SERPL-MCNC: 155 MG/DL
CHOLEST/HDLC SERPL: 4.2 RATIO
CO2 SERPL-SCNC: 26 MMOL/L
CREAT SERPL-MCNC: 0.97 MG/DL
GLUCOSE SERPL-MCNC: 75 MG/DL
HBA1C MFR BLD HPLC: 6.5 %
HDLC SERPL-MCNC: 37 MG/DL
LDLC SERPL CALC-MCNC: 92 MG/DL
POTASSIUM SERPL-SCNC: 3.5 MMOL/L
PROT SERPL-MCNC: 7.7 G/DL
SODIUM SERPL-SCNC: 138 MMOL/L
TRIGL SERPL-MCNC: 131 MG/DL

## 2019-04-19 ENCOUNTER — APPOINTMENT (OUTPATIENT)
Dept: ORTHOPEDIC SURGERY | Facility: CLINIC | Age: 64
End: 2019-04-19
Payer: OTHER MISCELLANEOUS

## 2019-04-19 DIAGNOSIS — M70.60 TROCHANTERIC BURSITIS, UNSPECIFIED HIP: ICD-10-CM

## 2019-04-19 PROCEDURE — 99214 OFFICE O/P EST MOD 30 MIN: CPT

## 2019-04-19 NOTE — HISTORY OF PRESENT ILLNESS
[de-identified] : 64 year old male s/p left total hip replacement presents to the office today for a follow up of his left hip pain. Pt reports having increased pain with ambulation. When we last saw him in January we decided to have him continue physical therapy. Pt is looking to have bilateral total knee replacements with another orthopedist, but there is no set date at this time. He continues to suffer from low back pain, he reports just recently having an injection on Saturday, but states he is not having any relief. Today patient states he continues to have this pain with ambulation despite his continued physical therapy.

## 2019-04-19 NOTE — REVIEW OF SYSTEMS
[Joint Stiffness] : joint stiffness [Joint Pain] : joint pain [Joint Swelling] : joint swelling [Negative] : Heme/Lymph

## 2019-04-19 NOTE — ASSESSMENT
[FreeTextEntry1] : Patient continues to complain of pain that comes on multiple occasions primarily his lower lumbar area on the left side and lateralized aspect of the left hip consistent with a trochanteric bursitis additionally he has weak hip abductors which are difficult to rehabilitation given his low back pain. I called his physical therapist today we discussed his case at length he is going to treat her trochanteric bursitis and weak hip abductors and taken to consideration the patient's back pain the patient will follow up with me in 2-3 months time

## 2019-04-19 NOTE — PHYSICAL EXAM
[de-identified] : General appearance: well nourished and hydrated, pleasant, alert and oriented x 3, cooperative.\par Cardiovascular: no apparent abnormalities, no lower leg edema, no varicosities, pedal pulses are palpable.\par Neurologic: sensation is normal, no muscle weakness in upper or lower extremities.\par Gait: Ambulating with a cane. \par \par Left hip\par Inspection: Swelling about the left lower extremity. \par Wounds: Well healed incision about the left hip. \par Palpation: Non tender to palpation about the left hip. \par Stability: no instability.\par Strength: 2/5 motor groups\par ROM: Restricted ROM with flexion, internal rotation, and external rotation due to pain in the anterior lateral aspect of the left hip and the left lower back. \par \par  [de-identified] : Radiographs done today April 19, 2019 AP pelvis and lateral of the left hip show a well aligned well fixed cementless amie

## 2019-04-24 ENCOUNTER — MEDICATION RENEWAL (OUTPATIENT)
Age: 64
End: 2019-04-24

## 2019-05-10 ENCOUNTER — RX RENEWAL (OUTPATIENT)
Age: 64
End: 2019-05-10

## 2019-05-10 ENCOUNTER — MOBILE ON CALL (OUTPATIENT)
Age: 64
End: 2019-05-10

## 2019-05-13 ENCOUNTER — RX RENEWAL (OUTPATIENT)
Age: 64
End: 2019-05-13

## 2019-05-29 ENCOUNTER — APPOINTMENT (OUTPATIENT)
Dept: HEART AND VASCULAR | Facility: CLINIC | Age: 64
End: 2019-05-29

## 2019-07-08 ENCOUNTER — OTHER (OUTPATIENT)
Age: 64
End: 2019-07-08

## 2019-07-10 ENCOUNTER — APPOINTMENT (OUTPATIENT)
Dept: INTERNAL MEDICINE | Facility: CLINIC | Age: 64
End: 2019-07-10
Payer: MEDICARE

## 2019-07-10 VITALS
DIASTOLIC BLOOD PRESSURE: 78 MMHG | BODY MASS INDEX: 37.33 KG/M2 | WEIGHT: 252 LBS | OXYGEN SATURATION: 97 % | HEIGHT: 69 IN | HEART RATE: 90 BPM | SYSTOLIC BLOOD PRESSURE: 110 MMHG | TEMPERATURE: 98.8 F

## 2019-07-10 PROCEDURE — 99397 PER PM REEVAL EST PAT 65+ YR: CPT | Mod: 25

## 2019-07-10 PROCEDURE — 36415 COLL VENOUS BLD VENIPUNCTURE: CPT

## 2019-07-11 ENCOUNTER — OTHER (OUTPATIENT)
Age: 64
End: 2019-07-11

## 2019-07-11 LAB
ALBUMIN SERPL ELPH-MCNC: 4.2 G/DL
ALP BLD-CCNC: 102 U/L
ALT SERPL-CCNC: 35 U/L
ANION GAP SERPL CALC-SCNC: 16 MMOL/L
AST SERPL-CCNC: 22 U/L
BASOPHILS # BLD AUTO: 0.09 K/UL
BASOPHILS NFR BLD AUTO: 1.2 %
BILIRUB SERPL-MCNC: 0.4 MG/DL
BUN SERPL-MCNC: 19 MG/DL
CALCIUM SERPL-MCNC: 10.1 MG/DL
CHLORIDE SERPL-SCNC: 96 MMOL/L
CO2 SERPL-SCNC: 27 MMOL/L
CREAT SERPL-MCNC: 1.36 MG/DL
CREAT SPEC-SCNC: 534 MG/DL
EOSINOPHIL # BLD AUTO: 0.09 K/UL
EOSINOPHIL NFR BLD AUTO: 1.2 %
ESTIMATED AVERAGE GLUCOSE: 131 MG/DL
GLUCOSE SERPL-MCNC: 84 MG/DL
HBA1C MFR BLD HPLC: 6.2 %
HCT VFR BLD CALC: 39 %
HGB BLD-MCNC: 13 G/DL
IMM GRANULOCYTES NFR BLD AUTO: 0.5 %
LYMPHOCYTES # BLD AUTO: 3.49 K/UL
LYMPHOCYTES NFR BLD AUTO: 45.3 %
MAN DIFF?: NORMAL
MCHC RBC-ENTMCNC: 29 PG
MCHC RBC-ENTMCNC: 33.3 GM/DL
MCV RBC AUTO: 86.9 FL
MICROALBUMIN 24H UR DL<=1MG/L-MCNC: 4.6 MG/DL
MICROALBUMIN/CREAT 24H UR-RTO: 9 MG/G
MONOCYTES # BLD AUTO: 0.67 K/UL
MONOCYTES NFR BLD AUTO: 8.7 %
NEUTROPHILS # BLD AUTO: 3.32 K/UL
NEUTROPHILS NFR BLD AUTO: 43.1 %
PLATELET # BLD AUTO: 612 K/UL
POTASSIUM SERPL-SCNC: 4 MMOL/L
PROT SERPL-MCNC: 7.9 G/DL
RBC # BLD: 4.49 M/UL
RBC # FLD: 15.2 %
SODIUM SERPL-SCNC: 139 MMOL/L
TSH SERPL-ACNC: 1.51 UIU/ML
WBC # FLD AUTO: 7.7 K/UL

## 2019-07-16 ENCOUNTER — OTHER (OUTPATIENT)
Age: 64
End: 2019-07-16

## 2019-07-22 ENCOUNTER — APPOINTMENT (OUTPATIENT)
Dept: ORTHOPEDIC SURGERY | Facility: CLINIC | Age: 64
End: 2019-07-22
Payer: OTHER MISCELLANEOUS

## 2019-07-22 DIAGNOSIS — M25.551 PAIN IN RIGHT HIP: ICD-10-CM

## 2019-07-22 DIAGNOSIS — M79.605 LOW BACK PAIN: ICD-10-CM

## 2019-07-22 DIAGNOSIS — M54.5 LOW BACK PAIN: ICD-10-CM

## 2019-07-22 DIAGNOSIS — M25.552 PAIN IN LEFT HIP: ICD-10-CM

## 2019-07-22 DIAGNOSIS — Z96.642 PRESENCE OF LEFT ARTIFICIAL HIP JOINT: ICD-10-CM

## 2019-07-22 PROCEDURE — 99213 OFFICE O/P EST LOW 20 MIN: CPT

## 2019-07-22 NOTE — HISTORY OF PRESENT ILLNESS
[de-identified] : 64 year old male s/p left total hip replacement in 2015 presents to the office today complaining of continued left hip and left lower back pain. He reports continuing to do physical therapy as advised with improvement. He would like to continue to do physical therapy. He is in the talks of having bilateral total knee replacements with another surgeon at this time, but needs to have a pacemaker insertion first. Pt is also complaining of occasional right hip pain that is sharp in nature. He reports being daisy to ambulate one about 1.5 blocks before pain, but attributes this pain to his arthritic knees. He has difficulty negotiating stairs, but also attributes this to his knees. Pt presents today to discuss his options further.

## 2019-07-22 NOTE — ASSESSMENT
[FreeTextEntry1] : Pt's left hip strength is improving in PT. Unfortunately his arthritic knee pain has progressed and he is considering elective knee replacement surgery. He will make an appointment in the near future for more recent radiographs of the knees and to discuss further. He will continue PT to work on strengthening of right hip and also treat his low back pain. He will remain out of work.

## 2019-07-22 NOTE — PHYSICAL EXAM
[de-identified] : Pt left hip is stronger today, he ambulated without the cane and with no Trendelenburg gait.

## 2019-07-29 ENCOUNTER — APPOINTMENT (OUTPATIENT)
Dept: HEART AND VASCULAR | Facility: CLINIC | Age: 64
End: 2019-07-29
Payer: MEDICARE

## 2019-07-29 ENCOUNTER — NON-APPOINTMENT (OUTPATIENT)
Age: 64
End: 2019-07-29

## 2019-07-29 VITALS
SYSTOLIC BLOOD PRESSURE: 135 MMHG | WEIGHT: 224 LBS | HEART RATE: 87 BPM | BODY MASS INDEX: 33.18 KG/M2 | DIASTOLIC BLOOD PRESSURE: 74 MMHG | HEIGHT: 69 IN

## 2019-07-29 PROCEDURE — 99214 OFFICE O/P EST MOD 30 MIN: CPT

## 2019-07-29 PROCEDURE — 93000 ELECTROCARDIOGRAM COMPLETE: CPT

## 2019-07-29 NOTE — PHYSICAL EXAM
[General Appearance - Well Developed] : well developed [Normal Appearance] : normal appearance [Well Groomed] : well groomed [General Appearance - Well Nourished] : well nourished [General Appearance - In No Acute Distress] : no acute distress [No Deformities] : no deformities [Normal Conjunctiva] : the conjunctiva exhibited no abnormalities [Eyelids - No Xanthelasma] : the eyelids demonstrated no xanthelasmas [Normal Oral Mucosa] : normal oral mucosa [No Oral Pallor] : no oral pallor [No Oral Cyanosis] : no oral cyanosis [Normal Jugular Venous A Waves Present] : normal jugular venous A waves present [Normal Jugular Venous V Waves Present] : normal jugular venous V waves present [No Jugular Venous Poon A Waves] : no jugular venous poon A waves [Exaggerated Use Of Accessory Muscles For Inspiration] : no accessory muscle use [Respiration, Rhythm And Depth] : normal respiratory rhythm and effort [Auscultation Breath Sounds / Voice Sounds] : lungs were clear to auscultation bilaterally [Murmurs] : no murmurs present [Heart Sounds] : normal S1 and S2 [Heart Rate And Rhythm] : heart rate and rhythm were normal [Abdomen Soft] : soft [Abdomen Tenderness] : non-tender [Abdomen Mass (___ Cm)] : no abdominal mass palpated [Abnormal Walk] : normal gait [Nail Clubbing] : no clubbing of the fingernails [Gait - Sufficient For Exercise Testing] : the gait was sufficient for exercise testing [Cyanosis, Localized] : no localized cyanosis [Petechial Hemorrhages (___cm)] : no petechial hemorrhages [] : no ischemic changes
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
## 2019-08-06 LAB — HEMOCCULT STL QL IA: NEGATIVE

## 2019-08-07 ENCOUNTER — APPOINTMENT (OUTPATIENT)
Dept: ORTHOPEDIC SURGERY | Facility: CLINIC | Age: 64
End: 2019-08-07

## 2019-08-12 ENCOUNTER — FORM ENCOUNTER (OUTPATIENT)
Age: 64
End: 2019-08-12

## 2019-08-13 ENCOUNTER — OUTPATIENT (OUTPATIENT)
Dept: OUTPATIENT SERVICES | Facility: HOSPITAL | Age: 64
LOS: 1 days | End: 2019-08-13
Payer: MEDICARE

## 2019-08-13 DIAGNOSIS — R55 SYNCOPE AND COLLAPSE: ICD-10-CM

## 2019-08-13 PROCEDURE — 93306 TTE W/DOPPLER COMPLETE: CPT | Mod: 26

## 2019-08-13 PROCEDURE — 93306 TTE W/DOPPLER COMPLETE: CPT

## 2019-09-18 ENCOUNTER — APPOINTMENT (OUTPATIENT)
Dept: ORTHOPEDIC SURGERY | Facility: CLINIC | Age: 64
End: 2019-09-18

## 2019-10-04 ENCOUNTER — RX RENEWAL (OUTPATIENT)
Age: 64
End: 2019-10-04

## 2019-10-07 ENCOUNTER — RX RENEWAL (OUTPATIENT)
Age: 64
End: 2019-10-07

## 2019-10-18 ENCOUNTER — RX RENEWAL (OUTPATIENT)
Age: 64
End: 2019-10-18

## 2019-11-04 ENCOUNTER — APPOINTMENT (OUTPATIENT)
Dept: ORTHOPEDIC SURGERY | Facility: CLINIC | Age: 64
End: 2019-11-04
Payer: OTHER MISCELLANEOUS

## 2019-11-04 DIAGNOSIS — M25.561 PAIN IN RIGHT KNEE: ICD-10-CM

## 2019-11-04 DIAGNOSIS — Z86.718 PERSONAL HISTORY OF OTHER VENOUS THROMBOSIS AND EMBOLISM: ICD-10-CM

## 2019-11-04 DIAGNOSIS — Z96.642 PRESENCE OF LEFT ARTIFICIAL HIP JOINT: ICD-10-CM

## 2019-11-04 PROCEDURE — 99215 OFFICE O/P EST HI 40 MIN: CPT | Mod: 25

## 2019-11-04 PROCEDURE — 73564 X-RAY EXAM KNEE 4 OR MORE: CPT | Mod: 50

## 2019-11-04 PROCEDURE — 20610 DRAIN/INJ JOINT/BURSA W/O US: CPT | Mod: 50

## 2019-11-04 RX ADMIN — Medication 1 MG/ML: at 00:00

## 2019-11-04 NOTE — HISTORY OF PRESENT ILLNESS
[Pain Location] : pain [Worsening] : worsening [9] : a maximum pain level of 9/10 [Walking] : walking [Sitting] : sitting [Standing] : standing [Constant] : ~He/She~ states the symptoms seem to be constant [de-identified] : 64 year old male s/p left total hip replacement presents to the office today to follow up on his bilateral arthritic knees. In regard to his left hip replacement, he reports increased erythema and tenderness just one week ago. Pt states that has now subsided. In regard to the knees, he reports a pain that is sharp in nature. He also reports swelling, buckling, locking, clicking, and loss of motion. He reports only being able to ambulate about one block before pain stops him. Pt is ambulating with a cane due to the pain in his knees. He also reports increased pain and difficulty with negotiating stairs. Pt pulls himself up using the railing. He also reports increased pain with standing from a seated position. He reports taking Advil with only some relief. He has received gel and cortisone injections in the past. He also did physical therapy in June 2019. 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 At this time, patient is unable to have elective total knee replacement surgery because he is awaiting a date for pacemaker insertion.

## 2019-11-04 NOTE — ASSESSMENT
[FreeTextEntry1] : Pt presents for fu of his bilateral knee arthritis. He has been contemplating having elective knee replacement surgery. This cannot be done until his cardiac evaluation is completed, apparently they are considering placement of a pacemaker. In the mean time he was given bilateral injections of Kenalog and Lidocaine. He experience immediate relieve from the injections. Pt will f/u in 2 months time. \par \par Discussed at length with the patient the planned steroid and lidocaine injection. The risks, benefits, convalescence and alternatives were reviewed. The possible side effects discussed included but were not limited to: pain, swelling, heat and redness. There symptoms are generally mild but if they are extensive then contact the office. Giving pain relievers by mouth such as NSAID’s or Tylenol can generally treat the reactions to steroid and lidocaine. Rare cases of infection have been noted. Rash, hives and itching may occur post injection. If you have muscle pain or cramps, flushing and or swelling of the face, rapid heart beat, nausea, dizziness, fever, chills, headache, difficulty breathing, swelling in the arms or legs, or have a prickly feeling of your skin, contact a health care provider immediately.\par \par Following this discussion, the right and left knee was prepped with Betadine and under sterile conditions 4 cc of 1% lidocaine and 1 ml Kenalog were injected with a 21 gauge needle. The needle was introduced into the joint, aspiration was performed to ensure intra-articular placement and the medication was injected. Upon withdrawal of the needle the site was cleaned with alcohol and a Band-Aid applied. The patient tolerated the injection well and there were no adverse effects. Post injection instructions included no strenuous activity for 24 hours, cryotherapy and if there are any adverse effects to contact the office.

## 2019-11-04 NOTE — REASON FOR VISIT
[Follow-Up Visit] : a follow-up visit for [Artificial Hip Joint] : artificial hip joint [Knee Pain] : knee pain

## 2019-11-04 NOTE — PHYSICAL EXAM
[Cane] : ambulates with cane [de-identified] : Left Knee\par Inspection: no effusion or erythema.\par Wounds: none.\par Alignment: normal.\par Palpation: no specific tenderness on palpation.\par ROM: 0-95 degrees \par Ligamentous laxity: all ligaments appear stable\par Muscle Test: good quad strength.\par Leg examination: calf is soft and non-tender.\par \par Right knee\par Inspection: no effusion or erythema.\par Wounds: none.\par Alignment: normal.\par Palpation: no specific tenderness on palpation.\par ROM: 0-95 degrees \par Ligamentous laxity: all ligaments appear \par Muscle Test: good quad strength.\par Leg examination: calf is soft and non-tender. [de-identified] : Radiographs done today AP lateral and skyline of both knees shows the bony structures are intact , moderate joint space narrowing and osteophyte formation in all compartments of the knee.

## 2019-11-05 ENCOUNTER — RX RENEWAL (OUTPATIENT)
Age: 64
End: 2019-11-05

## 2019-11-11 ENCOUNTER — RECORD ABSTRACTING (OUTPATIENT)
Age: 64
End: 2019-11-11

## 2019-11-21 ENCOUNTER — APPOINTMENT (OUTPATIENT)
Dept: HEART AND VASCULAR | Facility: CLINIC | Age: 64
End: 2019-11-21

## 2019-12-30 ENCOUNTER — RX RENEWAL (OUTPATIENT)
Age: 64
End: 2019-12-30

## 2020-01-09 ENCOUNTER — OTHER (OUTPATIENT)
Age: 65
End: 2020-01-09

## 2020-01-13 ENCOUNTER — APPOINTMENT (OUTPATIENT)
Dept: INTERNAL MEDICINE | Facility: CLINIC | Age: 65
End: 2020-01-13
Payer: MEDICARE

## 2020-01-13 VITALS
BODY MASS INDEX: 31.4 KG/M2 | HEART RATE: 90 BPM | DIASTOLIC BLOOD PRESSURE: 92 MMHG | TEMPERATURE: 98.5 F | WEIGHT: 212 LBS | HEIGHT: 69 IN | SYSTOLIC BLOOD PRESSURE: 148 MMHG | OXYGEN SATURATION: 94 %

## 2020-01-13 DIAGNOSIS — N50.89 OTHER SPECIFIED DISORDERS OF THE MALE GENITAL ORGANS: ICD-10-CM

## 2020-01-13 PROCEDURE — 99214 OFFICE O/P EST MOD 30 MIN: CPT

## 2020-01-14 ENCOUNTER — RX RENEWAL (OUTPATIENT)
Age: 65
End: 2020-01-14

## 2020-01-15 ENCOUNTER — APPOINTMENT (OUTPATIENT)
Dept: ORTHOPEDIC SURGERY | Facility: CLINIC | Age: 65
End: 2020-01-15
Payer: OTHER MISCELLANEOUS

## 2020-01-15 PROCEDURE — 99214 OFFICE O/P EST MOD 30 MIN: CPT

## 2020-01-15 NOTE — ASSESSMENT
[FreeTextEntry1] : Pt is here to f/u after bilateral steroid injections for his arthritic knees. Unfortunately he only had pain relief for about a week. Since we last saw him he has lost 50 pounds unexplainably, he is undergoing a workup with his PCP for his rapid weight loss. He also is in the process of scheduling a pace maker procedure. He is interested in having an elective knee replacement surgery in the future once his workup is completed, and he re cooperates from the pacemaker surgery. F/u in 2 months

## 2020-01-15 NOTE — HISTORY OF PRESENT ILLNESS
[Pain Location] : pain [9] : a maximum pain level of 9/10 [Worsening] : worsening [Sitting] : sitting [Walking] : walking [Standing] : standing [Constant] : ~He/She~ states the symptoms seem to be constant [de-identified] : 11/4/2019 - 64 year old male s/p left total hip replacement presents to the office today to follow up on his bilateral arthritic knees. In regard to his left hip replacement, he reports increased erythema and tenderness just one week ago. Pt states that has now subsided. In regard to the knees, he reports a pain that is sharp in nature. He also reports swelling, buckling, locking, clicking, and loss of motion. He reports only being able to ambulate about one block before pain stops him. Pt is ambulating with a cane due to the pain in his knees. He also reports increased pain and difficulty with negotiating stairs. Pt pulls himself up using the railing. He also reports increased pain with standing from a seated position. He reports taking Advil with only some relief. He has received gel and cortisone injections in the past. He also did physical therapy in June 2019. 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 At this time, patient is unable to have elective total knee replacement surgery because he is awaiting a date for pacemaker insertion. \par \par 1/15/2020 - 64 year old male presents to the office today for a follow up on his bilateral arthritic knees. Pt was given a kenalog injection when he was last seen by us for pain relief. Pt reports one week of relief with this. Pt has been undergoing a cardiac work up and is to have a pacemaker inserted. He was scared to go forward with the pacemaker insertion but has now decided to do so. Pt continues to complain of pain and is taking Advil and Tylenol with only some relief.

## 2020-01-15 NOTE — PHYSICAL EXAM
[Cane] : ambulates with cane [de-identified] : Left Knee\par Inspection: no effusion or erythema.\par Wounds: none.\par Alignment: normal.\par Palpation: no specific tenderness on palpation.\par ROM: 0-95 degrees \par Ligamentous laxity: all ligaments appear stable\par Muscle Test: good quad strength.\par Leg examination: calf is soft and non-tender.\par \par Right knee\par Inspection: no effusion or erythema.\par Wounds: none.\par Alignment: normal.\par Palpation: no specific tenderness on palpation.\par ROM: 0-95 degrees \par Ligamentous laxity: all ligaments appear \par Muscle Test: good quad strength.\par Leg examination: calf is soft and non-tender. [de-identified] : Radiographs done today AP lateral and skyline of both knees shows the bony structures are intact , moderate joint space narrowing and osteophyte formation in all compartments of the knee.

## 2020-01-16 ENCOUNTER — FORM ENCOUNTER (OUTPATIENT)
Age: 65
End: 2020-01-16

## 2020-01-17 ENCOUNTER — APPOINTMENT (OUTPATIENT)
Dept: ULTRASOUND IMAGING | Facility: HOSPITAL | Age: 65
End: 2020-01-17

## 2020-01-17 ENCOUNTER — OUTPATIENT (OUTPATIENT)
Dept: OUTPATIENT SERVICES | Facility: HOSPITAL | Age: 65
LOS: 1 days | End: 2020-01-17
Payer: MEDICARE

## 2020-01-17 PROCEDURE — 93975 VASCULAR STUDY: CPT | Mod: 26

## 2020-01-17 PROCEDURE — 93975 VASCULAR STUDY: CPT

## 2020-01-17 PROCEDURE — 76870 US EXAM SCROTUM: CPT | Mod: 26

## 2020-01-17 PROCEDURE — 76870 US EXAM SCROTUM: CPT

## 2020-01-21 ENCOUNTER — APPOINTMENT (OUTPATIENT)
Dept: UROLOGY | Facility: CLINIC | Age: 65
End: 2020-01-21
Payer: MEDICARE

## 2020-01-21 VITALS — SYSTOLIC BLOOD PRESSURE: 144 MMHG | DIASTOLIC BLOOD PRESSURE: 80 MMHG | HEART RATE: 91 BPM | TEMPERATURE: 97.5 F

## 2020-01-21 DIAGNOSIS — N45.2 ORCHITIS: ICD-10-CM

## 2020-01-21 PROCEDURE — 99204 OFFICE O/P NEW MOD 45 MIN: CPT

## 2020-01-21 NOTE — ASSESSMENT
[FreeTextEntry1] : bilateral tetsicular swelling\par BPH\par elevated PSA\par ?orchitis\par trial flomax 0.4\par trial bactrim\par UA UCX\par scrotal sonogram\par f/u 1 month\par repeat PSA 6-8 weeks\par PVR 37

## 2020-01-21 NOTE — PHYSICAL EXAM
[General Appearance - Well Nourished] : well nourished [Normal Appearance] : normal appearance [General Appearance - Well Developed] : well developed [Heart Rate And Rhythm] : Heart rate and rhythm were normal [Well Groomed] : well groomed [Abdomen Soft] : soft [] : no respiratory distress [Abdomen Tenderness] : non-tender [Costovertebral Angle Tenderness] : no ~M costovertebral angle tenderness [Abdomen Hernia] : no hernia was discovered [Scrotum] : the scrotum was normal [Prostate Tenderness] : the prostate was not tender [Urethral Meatus] : meatus normal [Urinary Bladder Findings] : the bladder was normal on palpation [No Prostate Nodules] : no prostate nodules [FreeTextEntry1] : full firm bilateral testicles. 30-40cc. nontender. ?hydrocele vs orchitis. no erythema [Prostate Size ___ gm] : prostate size [unfilled] gm [Skin Color & Pigmentation] : normal skin color and pigmentation [Normal Station and Gait] : the gait and station were normal for the patient's age [No Focal Deficits] : no focal deficits [No Palpable Adenopathy] : no palpable adenopathy [Oriented To Time, Place, And Person] : oriented to person, place, and time

## 2020-01-21 NOTE — HISTORY OF PRESENT ILLNESS
[FreeTextEntry1] : 64M hx HTN DM comes in with complaint of swollen biltaeral testicle noted x 2-3 weeks. initally very painful and now has been improving. no abx given. seen by PCP who referred pt here. no UA UCX. no fevers chills. no dysuria. nocturia x 2-3. PSA 4.5. +urgency. no hematurai. no split stream. no double voiding. no family history prostate kdiney bladder cancer. +family history pancreatic cancer.

## 2020-01-27 LAB
APPEARANCE: ABNORMAL
BACTERIA UR CULT: ABNORMAL
BACTERIA: ABNORMAL
BILIRUBIN URINE: NEGATIVE
BLOOD URINE: NEGATIVE
COLOR: YELLOW
GLUCOSE QUALITATIVE U: NEGATIVE
HYALINE CASTS: 2 /LPF
KETONES URINE: NEGATIVE
LEUKOCYTE ESTERASE URINE: ABNORMAL
MICROSCOPIC-UA: NORMAL
NITRITE URINE: NEGATIVE
PH URINE: 7.5
PROTEIN URINE: NORMAL
RED BLOOD CELLS URINE: 5 /HPF
SPECIFIC GRAVITY URINE: 1.01
SQUAMOUS EPITHELIAL CELLS: 0 /HPF
UROBILINOGEN URINE: ABNORMAL
WHITE BLOOD CELLS URINE: 76 /HPF

## 2020-01-30 ENCOUNTER — OTHER (OUTPATIENT)
Age: 65
End: 2020-01-30

## 2020-01-30 LAB
ALBUMIN SERPL ELPH-MCNC: 3.9 G/DL
ALP BLD-CCNC: 113 U/L
ALT SERPL-CCNC: 28 U/L
ANION GAP SERPL CALC-SCNC: 14 MMOL/L
AST SERPL-CCNC: 16 U/L
BASOPHILS # BLD AUTO: 0.07 K/UL
BASOPHILS NFR BLD AUTO: 0.6 %
BILIRUB SERPL-MCNC: 0.4 MG/DL
BUN SERPL-MCNC: 13 MG/DL
CALCIUM SERPL-MCNC: 9.6 MG/DL
CHLORIDE SERPL-SCNC: 97 MMOL/L
CHOLEST SERPL-MCNC: 170 MG/DL
CHOLEST/HDLC SERPL: 4.7 RATIO
CO2 SERPL-SCNC: 26 MMOL/L
CREAT SERPL-MCNC: 0.92 MG/DL
EOSINOPHIL # BLD AUTO: 0.08 K/UL
EOSINOPHIL NFR BLD AUTO: 0.7 %
ESTIMATED AVERAGE GLUCOSE: 128 MG/DL
GLUCOSE SERPL-MCNC: 84 MG/DL
HBA1C MFR BLD HPLC: 6.1 %
HCT VFR BLD CALC: 37.9 %
HDLC SERPL-MCNC: 36 MG/DL
HGB BLD-MCNC: 12.3 G/DL
IMM GRANULOCYTES NFR BLD AUTO: 1.1 %
LDLC SERPL CALC-MCNC: 107 MG/DL
LYMPHOCYTES # BLD AUTO: 2.95 K/UL
LYMPHOCYTES NFR BLD AUTO: 27.3 %
MAN DIFF?: NORMAL
MCHC RBC-ENTMCNC: 28.5 PG
MCHC RBC-ENTMCNC: 32.5 GM/DL
MCV RBC AUTO: 87.7 FL
MONOCYTES # BLD AUTO: 1.08 K/UL
MONOCYTES NFR BLD AUTO: 10 %
NEUTROPHILS # BLD AUTO: 6.5 K/UL
NEUTROPHILS NFR BLD AUTO: 60.3 %
PLATELET # BLD AUTO: 570 K/UL
POTASSIUM SERPL-SCNC: 4.4 MMOL/L
PROT SERPL-MCNC: 7.3 G/DL
PSA SERPL-MCNC: 4.49 NG/ML
RBC # BLD: 4.32 M/UL
RBC # FLD: 16.4 %
SODIUM SERPL-SCNC: 137 MMOL/L
TRIGL SERPL-MCNC: 133 MG/DL
TSH SERPL-ACNC: 1.44 UIU/ML
WBC # FLD AUTO: 10.8 K/UL

## 2020-02-03 ENCOUNTER — APPOINTMENT (OUTPATIENT)
Dept: HEART AND VASCULAR | Facility: CLINIC | Age: 65
End: 2020-02-03
Payer: MEDICARE

## 2020-02-03 ENCOUNTER — NON-APPOINTMENT (OUTPATIENT)
Age: 65
End: 2020-02-03

## 2020-02-03 VITALS
DIASTOLIC BLOOD PRESSURE: 84 MMHG | WEIGHT: 213 LBS | HEIGHT: 69 IN | BODY MASS INDEX: 31.55 KG/M2 | HEART RATE: 86 BPM | SYSTOLIC BLOOD PRESSURE: 154 MMHG

## 2020-02-03 PROCEDURE — 99214 OFFICE O/P EST MOD 30 MIN: CPT | Mod: 25

## 2020-02-03 PROCEDURE — 93000 ELECTROCARDIOGRAM COMPLETE: CPT

## 2020-02-05 NOTE — DISCUSSION/SUMMARY
[FreeTextEntry1] : Mr. Sigala is a 64 year-old male with mixed cardiomyopathy with an EF of 37% along with extensive scarring (including transmural scar of the inferior wall).  His episode of syncope is concerning in that he had no prodrome and I am concerned that it could have been VT-mediated.  We reviewed his diagnoses extensively.  We reviewed the indication for an EP study to evaluate for the inducibility of  VT.  We also had discussed the option for proceeding directly to ICD implantation without an EP study.  This is not the standard of care, but there is growing evidence that males with Mr. Sigala' degree of LGE are at an increased risk for SCD and derive a significant mortality benefit from ICD implantation. However, his LVEF has normalized based upon most recent ECHO 8/2019.  At this time, we will defer EP intervention as he is undergoing work-up for unintentional weight loss.  Pending these results, we will revisit options for EP intervention.

## 2020-02-05 NOTE — HISTORY OF PRESENT ILLNESS
[FreeTextEntry1] : Mr. Sigala is a 64 year-old gentleman with type II DM, hypertensin, hyperlipidemia, CAD s/p PCI, syncope and mixed cardiomyopathy (EF 40% with transmural scar on CMR) who presents for follow-up to discuss ICD placement.\par \par He was referred following an episode of syncope that occurred while he was driving.  He notes no prodrome and can not remember any symptoms that led up to the event.  He did not sustain any significant trauma and denies any postictal period.  He continues to deny any recurrent presyncope or syncope. He denies chest pain, SOB, palpitations, orthopnea, PND, and LE edema.  He previously cancelled ICD placement because he was "scared" by his report.  Of note, he is undergoing work-up with Dr. Tsai for unintentional weight loss (30 pounds).  \par \par ECHO 8/2019 EF 50-55%, no significant valvular disease

## 2020-02-18 ENCOUNTER — APPOINTMENT (OUTPATIENT)
Dept: INTERNAL MEDICINE | Facility: CLINIC | Age: 65
End: 2020-02-18
Payer: MEDICARE

## 2020-02-18 ENCOUNTER — RX RENEWAL (OUTPATIENT)
Age: 65
End: 2020-02-18

## 2020-02-18 VITALS
SYSTOLIC BLOOD PRESSURE: 119 MMHG | DIASTOLIC BLOOD PRESSURE: 78 MMHG | HEART RATE: 87 BPM | OXYGEN SATURATION: 97 % | HEIGHT: 69 IN | BODY MASS INDEX: 33.33 KG/M2 | WEIGHT: 225 LBS | TEMPERATURE: 98.7 F

## 2020-02-18 PROCEDURE — 93000 ELECTROCARDIOGRAM COMPLETE: CPT

## 2020-02-18 PROCEDURE — 99214 OFFICE O/P EST MOD 30 MIN: CPT | Mod: 25

## 2020-02-24 ENCOUNTER — NON-APPOINTMENT (OUTPATIENT)
Age: 65
End: 2020-02-24

## 2020-02-24 ENCOUNTER — APPOINTMENT (OUTPATIENT)
Dept: HEART AND VASCULAR | Facility: CLINIC | Age: 65
End: 2020-02-24
Payer: MEDICARE

## 2020-02-24 ENCOUNTER — APPOINTMENT (OUTPATIENT)
Dept: CT IMAGING | Facility: HOSPITAL | Age: 65
End: 2020-02-24

## 2020-02-24 VITALS
BODY MASS INDEX: 33.33 KG/M2 | DIASTOLIC BLOOD PRESSURE: 77 MMHG | SYSTOLIC BLOOD PRESSURE: 129 MMHG | HEART RATE: 83 BPM | WEIGHT: 225 LBS | HEIGHT: 69 IN

## 2020-02-24 PROCEDURE — 99214 OFFICE O/P EST MOD 30 MIN: CPT | Mod: 25

## 2020-02-24 PROCEDURE — 93000 ELECTROCARDIOGRAM COMPLETE: CPT

## 2020-02-24 RX ORDER — SULFAMETHOXAZOLE AND TRIMETHOPRIM 800; 160 MG/1; MG/1
800-160 TABLET ORAL TWICE DAILY
Qty: 28 | Refills: 0 | Status: DISCONTINUED | COMMUNITY
Start: 2020-01-21 | End: 2020-02-24

## 2020-02-24 NOTE — PHYSICAL EXAM
[Normal Appearance] : normal appearance [General Appearance - Well Developed] : well developed [General Appearance - Well Nourished] : well nourished [Well Groomed] : well groomed [General Appearance - In No Acute Distress] : no acute distress [No Deformities] : no deformities [Normal Conjunctiva] : the conjunctiva exhibited no abnormalities [Normal Oral Mucosa] : normal oral mucosa [No Oral Pallor] : no oral pallor [Eyelids - No Xanthelasma] : the eyelids demonstrated no xanthelasmas [Normal Jugular Venous A Waves Present] : normal jugular venous A waves present [No Oral Cyanosis] : no oral cyanosis [Normal Jugular Venous V Waves Present] : normal jugular venous V waves present [No Jugular Venous Poon A Waves] : no jugular venous poon A waves [Respiration, Rhythm And Depth] : normal respiratory rhythm and effort [Exaggerated Use Of Accessory Muscles For Inspiration] : no accessory muscle use [Auscultation Breath Sounds / Voice Sounds] : lungs were clear to auscultation bilaterally [Heart Rate And Rhythm] : heart rate and rhythm were normal [Heart Sounds] : normal S1 and S2 [Murmurs] : no murmurs present [Abdomen Soft] : soft [Abdomen Tenderness] : non-tender [Abdomen Mass (___ Cm)] : no abdominal mass palpated [Gait - Sufficient For Exercise Testing] : the gait was sufficient for exercise testing [Abnormal Walk] : normal gait [Nail Clubbing] : no clubbing of the fingernails [Cyanosis, Localized] : no localized cyanosis [Petechial Hemorrhages (___cm)] : no petechial hemorrhages [] : no ischemic changes

## 2020-02-26 ENCOUNTER — NON-APPOINTMENT (OUTPATIENT)
Age: 65
End: 2020-02-26

## 2020-02-26 NOTE — DISCUSSION/SUMMARY
[FreeTextEntry1] : Mr. Sigala is a 65 year-old male with a history of mixed cardiomyopathy (now recovered) along with extensive scarring (including transmural scar of the inferior wall).  Prior episode of syncope is concerning in that he had no prodrome and I am concerned that it could have been VT-mediated.  He has now had recurrent syncope with brief prodrome.  We reviewed his diagnoses extensively. We again  discussed the option for proceeding directly to ICD implantation without an EP study.  This is not the standard of care, but there is growing evidence that males with Mr. Sigala' degree of LGE are at an increased risk for SCD and derive a significant mortality benefit from ICD implantation.  Risks of ICD placement, including but not limited to infection, vascular injury, cardiac perforation, pneumothorax were among those discussed.  He understands and wishes to proceed.  All questions were answered to his apparent satisfaction.

## 2020-02-26 NOTE — HISTORY OF PRESENT ILLNESS
[FreeTextEntry1] : Mr. Sigala is a 64 year-old gentleman with type II DM, hypertensin, hyperlipidemia, CAD s/p PCI, syncope and mixed cardiomyopathy (EF 40% with transmural scar on CMR) who presents for follow-up to discuss ICD placement.\par \par He was referred following an episode of syncope that occurred while he was driving.  He notes no prodrome and can not remember any symptoms that led up to the event.  He did not sustain any significant trauma and denies any postictal period.  He previously cancelled ICD placement because he was "scared" by his report.  Of note, he is undergoing work-up with Dr. Tsai for unintentional weight loss (30 pounds).  \par \par He had a syncopal episode last week in the morning.  He was standing in the kitchen making coffee when he started to feel dizzy; braced himself on the counter but had LOC.  Woke up on the kitchen floor; no confusion or incontinence.  Saw Dr. Tsai later that day and was recommended to present to ED but he did not do so.  Presents today requesting Nitroglycerin tablets because he was previously told he should carry them in case he has dizzy spells.  \par \par ECHO 8/2019 EF 50-55%, no significant valvular disease

## 2020-02-28 ENCOUNTER — APPOINTMENT (OUTPATIENT)
Dept: HEART AND VASCULAR | Facility: CLINIC | Age: 65
End: 2020-02-28
Payer: MEDICARE

## 2020-02-28 VITALS
WEIGHT: 223.99 LBS | DIASTOLIC BLOOD PRESSURE: 70 MMHG | BODY MASS INDEX: 33.95 KG/M2 | HEIGHT: 68 IN | TEMPERATURE: 98.8 F | OXYGEN SATURATION: 98 % | SYSTOLIC BLOOD PRESSURE: 120 MMHG | HEART RATE: 80 BPM

## 2020-02-28 PROCEDURE — 36415 COLL VENOUS BLD VENIPUNCTURE: CPT

## 2020-02-28 PROCEDURE — 93000 ELECTROCARDIOGRAM COMPLETE: CPT

## 2020-02-28 PROCEDURE — 99214 OFFICE O/P EST MOD 30 MIN: CPT

## 2020-02-28 RX ORDER — CHLORTHALIDONE 25 MG/1
25 TABLET ORAL DAILY
Qty: 90 | Refills: 0 | Status: DISCONTINUED | COMMUNITY
Start: 2018-09-21 | End: 2020-02-28

## 2020-02-28 NOTE — ASSESSMENT
[FreeTextEntry1] : syncope is cardiac i am concerned he may have sarcoid or amyloid as well will check for TTR amyloid\par refer to pulmonary for possible sarcoid work up HE CANNOT HAVE KNEE SURGERY UNTIL HE HAS COMPLETED HIS CARDIAC EVALUATION\par needs AICD\par lv dysfunction repeat echo will stop chlorthalidone and increase coreg and losartan\par ldl not at target at zetia 10 mg daily\par chest pain will cath fu in one week

## 2020-02-28 NOTE — HISTORY OF PRESENT ILLNESS
[FreeTextEntry1] : 65 M with DM HTN HPL CAD s/p PCI syncope ischemic CM EF 37% transmural scar as well as a non ischemic pattern h/o syncope in the past lost to follow up another of syncope had just gotten up from a bed went to kitchen to make coffee felt short of breath dizzy held on to the counter and found himself on the flow \par does have some chest discomfort when he walks once in a while can occur at rest but usually when he walks \par \par ecg sr low voltage poor r wave progression

## 2020-02-28 NOTE — PHYSICAL EXAM
[General Appearance - Well Developed] : well developed [Well Groomed] : well groomed [Normal Appearance] : normal appearance [General Appearance - Well Nourished] : well nourished [No Deformities] : no deformities [Normal Conjunctiva] : the conjunctiva exhibited no abnormalities [General Appearance - In No Acute Distress] : no acute distress [Normal Oral Mucosa] : normal oral mucosa [Eyelids - No Xanthelasma] : the eyelids demonstrated no xanthelasmas [No Oral Pallor] : no oral pallor [No Oral Cyanosis] : no oral cyanosis [Normal Jugular Venous A Waves Present] : normal jugular venous A waves present [Normal Jugular Venous V Waves Present] : normal jugular venous V waves present [No Jugular Venous Poon A Waves] : no jugular venous poon A waves [Exaggerated Use Of Accessory Muscles For Inspiration] : no accessory muscle use [Respiration, Rhythm And Depth] : normal respiratory rhythm and effort [Auscultation Breath Sounds / Voice Sounds] : lungs were clear to auscultation bilaterally [Heart Sounds] : normal S1 and S2 [Murmurs] : no murmurs present [Heart Rate And Rhythm] : heart rate and rhythm were normal [Abdomen Tenderness] : non-tender [Abdomen Soft] : soft [Abnormal Walk] : normal gait [Abdomen Mass (___ Cm)] : no abdominal mass palpated [Nail Clubbing] : no clubbing of the fingernails [Cyanosis, Localized] : no localized cyanosis [Gait - Sufficient For Exercise Testing] : the gait was sufficient for exercise testing [Petechial Hemorrhages (___cm)] : no petechial hemorrhages [Skin Color & Pigmentation] : normal skin color and pigmentation [] : no rash [No Venous Stasis] : no venous stasis [Skin Lesions] : no skin lesions [No Skin Ulcers] : no skin ulcer [No Xanthoma] : no  xanthoma was observed [Oriented To Time, Place, And Person] : oriented to person, place, and time [Affect] : the affect was normal [Mood] : the mood was normal [No Anxiety] : not feeling anxious

## 2020-03-05 VITALS
TEMPERATURE: 97 F | HEIGHT: 68 IN | HEART RATE: 85 BPM | OXYGEN SATURATION: 97 % | WEIGHT: 223.99 LBS | SYSTOLIC BLOOD PRESSURE: 109 MMHG | RESPIRATION RATE: 16 BRPM | DIASTOLIC BLOOD PRESSURE: 68 MMHG

## 2020-03-05 RX ORDER — CHLORHEXIDINE GLUCONATE 213 G/1000ML
1 SOLUTION TOPICAL ONCE
Refills: 0 | Status: DISCONTINUED | OUTPATIENT
Start: 2020-03-06 | End: 2020-03-07

## 2020-03-05 NOTE — H&P ADULT - NSICDXPASTSURGICALHX_GEN_ALL_CORE_FT
PAST SURGICAL HISTORY:  History of total hip replacement left hip, April 2015    Other acquired absence of organ S/P splenectomy

## 2020-03-05 NOTE — H&P ADULT - NSICDXPASTMEDICALHX_GEN_ALL_CORE_FT
PAST MEDICAL HISTORY:  Atherosclerosis of coronary artery s/p stent in 2009 and angioplasty in 2010    Essential hypertension Hypertension    Gout Gout    Hyperlipidemia Hyperlipidemia    Osteoarthritis Osteoarthritis    Type 2 diabetes mellitus DM2 (diabetes mellitus, type 2)

## 2020-03-05 NOTE — H&P ADULT - GASTROINTESTINAL DETAILS
no distention/no masses palpable/bowel sounds normal/soft/normal/no guarding/nontender/no rebound tenderness/no rigidity

## 2020-03-05 NOTE — H&P ADULT - RS GEN PE MLT RESP DETAILS PC
no wheezes/normal/good air movement/clear to auscultation bilaterally/no rales/no rhonchi/airway patent/breath sounds equal/no crackles/respirations non-labored

## 2020-03-05 NOTE — H&P ADULT - ASSESSMENT
64 y/o Male, former smoker, with FamHx of MI (grandmother  of MI, age 88) and PMHx of CAD (s/p PCI/NASRA of dRCA, mLAD/D3 bifurcation in , w/ subsequent dx cath in ), HTN, HLD, NIDDM, Mixed Cardiomyopathy (EF 37% w/ transmural scar by Cardiac MRI in 2018 and EF 50-55% by Echo in 2019), h/o multiple syncopal episodes (w/ presumed cardiac etiology), PAD (s/p PTA/orbital atherectomy of LSFA in ), and Gout, who presented to his cardiologist, Dr. Farfan, after syncopal episode approximately 3 weeks ago. Pt states he had just gotten up from bed, went to kitchen to make coffee when he began feeling SOB/dizzy, braced himself on the counter, but had LOC and found himself on the floor w/ no postictal confusion or incontinence. Pt reports another syncopal episode with no prodromal symptoms while driving a few months ago. As per most recent EP note (in [employer]), pt was presented option of proceeding directly to ICD placement without an EP study. As per Dr. Farfan's office note, pt may have sarcoid or amyloid and will be checked for TTR amyloid. Pharm Stress Test on 9/10/18 revealed no scan evidence of stress induced myocardial ischemia, decreased tracer concentration noted in the inferoapical wall c/w attenuation due to soft tissue or possibly scarring, EF 40% w/ global hypokinesis, normal stress EKG. Cardiac MRI on 18 revealed diffuse mild hypokinesis of LV walls w/ severe hypokinesis of the basal to apical inferolateral wall, EF 37%, mild hypertrophy of basal septal wall, RVEF 42%, transmural scar in the basal inferolateral wall w/ hyperenhancement in the basal/mid-septal and entire inferior region contiguously likely representing an atypical pattern of non-ischemic etiology (possibly sarcoidosis or prior myocarditis). Echo on 19 revealed EF 50-55%, mild symmetric LVH, trace MR, trace TR.  In light of pt's risk factors, known CAD history, likely cardiac etiology of syncopal episodes, and abnormal cardiac imaging, pt now presents to [facility] for recommended cardiac catheterization with possible intervention if clinically indicated.    -H/H = 12.9/38.3 -- patient denies BRBPR, hematuria, hematochezia, melena. Patient took daily 81mg ASA this AM. Will be loaded with 243mg ASA and 600mg Plavix pre procedure  -Cr = 0.87. EF 50% by echo; 40% per NST. Euvolemic on exam. NS IVF @ 50cc/hr started pre procedure   -Type of sedation: moderate  -Candidate for sedation: yes    Risks & benefits of procedure and alternative therapy have been explained to the patient including but not limited to: allergic reaction, bleeding w/possible need for blood transfusion, infection, renal and vascular compromise, limb damage, arrhythmia, stroke, vessel dissection/perforation, Myocardial infarction, emergent CABG. Informed consent obtained and in chart. 64 y/o Male, former smoker, with FamHx of MI (grandmother  of MI, age 88) and PMHx of CAD (s/p PCI/NASRA of dRCA, mLAD/D3 bifurcation in , w/ subsequent dx cath in ), HTN, HLD, NIDDM, Mixed Cardiomyopathy (EF 37% w/ transmural scar by Cardiac MRI in 2018 and EF 50-55% by Echo in 2019), h/o multiple syncopal episodes (w/ presumed cardiac etiology), PAD (s/p PTA/orbital atherectomy of LSFA in ), and Gout, who presented to his cardiologist, Dr. Farfan, after syncopal episode approximately 3 weeks ago. Pt states he had just gotten up from bed, went to kitchen to make coffee when he began feeling SOB/dizzy, braced himself on the counter, but had LOC and found himself on the floor w/ no postictal confusion or incontinence. Pt reports another syncopal episode with no prodromal symptoms while driving a few months ago. As per most recent EP note (in [employer]), pt was presented option of proceeding directly to ICD placement without an EP study. As per Dr. Farfan's office note, pt may have sarcoid or amyloid and will be checked for TTR amyloid. Pharm Stress Test on 9/10/18 revealed no scan evidence of stress induced myocardial ischemia, decreased tracer concentration noted in the inferoapical wall c/w attenuation due to soft tissue or possibly scarring, EF 40% w/ global hypokinesis, normal stress EKG. Cardiac MRI on 18 revealed diffuse mild hypokinesis of LV walls w/ severe hypokinesis of the basal to apical inferolateral wall, EF 37%, mild hypertrophy of basal septal wall, RVEF 42%, transmural scar in the basal inferolateral wall w/ hyperenhancement in the basal/mid-septal and entire inferior region contiguously likely representing an atypical pattern of non-ischemic etiology (possibly sarcoidosis or prior myocarditis). Echo on 19 revealed EF 50-55%, mild symmetric LVH, trace MR, trace TR.  In light of pt's risk factors, known CAD history, likely cardiac etiology of syncopal episodes, and abnormal cardiac imaging, pt now presents to [facility] for recommended cardiac catheterization with possible intervention if clinically indicated.    -H/H = 12.9/38.3 -- patient denies BRBPR, hematuria, hematochezia, melena. Patient took daily 81mg ASA this AM. Will be loaded with 243mg ASA and 600mg Plavix pre procedure  -Cr = 0.87. EF 50% by echo. Euvolemic on exam. NS IVF @ 75cc/hr started pre procedure   -Type of sedation: moderate  -Candidate for sedation: yes    Risks & benefits of procedure and alternative therapy have been explained to the patient including but not limited to: allergic reaction, bleeding w/possible need for blood transfusion, infection, renal and vascular compromise, limb damage, arrhythmia, stroke, vessel dissection/perforation, Myocardial infarction, emergent CABG. Informed consent obtained and in chart.

## 2020-03-05 NOTE — H&P ADULT - HISTORY OF PRESENT ILLNESS
64 y/o Male, former smoker, with PMHx of CAD (s/p PCI/NASRA of dRCA, mLAD/D3 bifurcation in 2009, w/ subsequent dx cath in 2015), HTN, HLD, NIDDM, Mixed Cardiomyopathy 64 y/o Male, former smoker, with FamHx of MI (grandmother  of MI, age 88) and PMHx of CAD (s/p PCI/NASRA of dRCA, mLAD/D3 bifurcation in , w/ subsequent dx cath in ), HTN, HLD, NIDDM, Mixed Cardiomyopathy (EF 37% w/ transmural scar by Cardiac MRI in 2018 and EF 50-55% by Echo in 2019), h/o multiple syncopal episodes (w/ presumed cardiac etiology), PAD (s/p PTA/orbital atherectomy of LSFA in ), and Gout, who presented to his cardiologist, Dr. Farfan, after syncopal episode approximately 3 weeks ago. Pt states he had just gotten up from bed, went to kitchen to make coffee when he began feeling SOB/dizzy, braced himself on the counter, but had LOC and found himself on the floor w/ no postictal confusion or incontinence. Pt was seen by PCP, Dr. Tsai that day and was recommended to present to ED, but pt did not do so. Pt reports another syncopal episode with no prodromal symptoms while driving a few months ago. As per most recent EP note (in [employer]), pt was presented option of proceeding directly to ICD placement without an EP study. As per Dr. Farfan's office note, pt may have sarcoid or amyloid and will be checked for TTR amyloid. Pt was also referred to pulmonary for possible sarcoid w/u, but pt has yet to schedule an appointment. Pt endorses occasional episodes of random palpitations occurring independent of exertion. Additionally, as per MD note, pt endorsed some chest discomfort occurring independent of exertion, but mostly with walking. On interview with PA, pt denies CP/discomfort, SOB, fevers, chills, cough, HA, PND, orthopnea, LE edema, n/v/d, melena, BRBPR, or any other symptoms at this time. Pharm Stress Test on 9/10/18 revealed no scan evidence of stress induced myocardial ischemia, decreased tracer concentration noted in the inferoapical wall c/w attenuation due to soft tissue or possibly scarring, EF 40% w/ global hypokinesis, normal stress EKG. Cardiac MRI on 18 revealed diffuse mild hypokinesis of LV walls w/ severe hypokinesis of the basal to apical inferolateral wall, EF 37%, mild hypertrophy of basal septal wall, RVEF 42%, transmural scar in the basal inferolateral wall w/ hyperenhancement in the basal/mid-septal and entire inferior region contiguously likely representing an atypical pattern of non-ischemic etiology (possibly sarcoidosis or prior myocarditis). Echo on 19 revealed EF 50-55%, mild symmetric LVH, trace MR, trace TR.  In light of pt's risk factors, known CAD history, likely cardiac etiology of syncopal episodes, and abnormal cardiac imaging, pt now presents to [facility] for recommended cardiac catheterization with possible intervention if clinically indicated.    Last Cath on 2/25/15 at [facility]: dLM 20%, mLAD stent widely patent, oD3 80% (small vessel, no change from prior cath), OM1 40%, mRCA 40%, EDP 9, EF 50%, normal wall motion, no AS, no MR. RHC: mild PHTN, RA 4, RV 38/0, PA 29, PWCP 6.

## 2020-03-05 NOTE — H&P ADULT - NEUROLOGICAL DETAILS
b/l LE strength decreased to 3/5 (slightly worse on R) and b/l upper extremities 3/5/responds to verbal commands/sensation intact/strength decreased/no spontaneous movement/alert and oriented x 3/responds to pain

## 2020-03-06 ENCOUNTER — INPATIENT (INPATIENT)
Facility: HOSPITAL | Age: 65
LOS: 0 days | Discharge: HOME | DRG: 247 | End: 2020-03-07
Attending: INTERNAL MEDICINE | Admitting: INTERNAL MEDICINE
Payer: MEDICARE

## 2020-03-06 LAB
ALBUMIN SERPL ELPH-MCNC: 4.1 G/DL — SIGNIFICANT CHANGE UP (ref 3.3–5)
ALP SERPL-CCNC: 67 U/L — SIGNIFICANT CHANGE UP (ref 40–120)
ALT FLD-CCNC: 13 U/L — SIGNIFICANT CHANGE UP (ref 10–45)
ANION GAP SERPL CALC-SCNC: 13 MMOL/L — SIGNIFICANT CHANGE UP (ref 5–17)
APTT BLD: 30.5 SEC — SIGNIFICANT CHANGE UP (ref 27.5–36.3)
AST SERPL-CCNC: 19 U/L — SIGNIFICANT CHANGE UP (ref 10–40)
BASOPHILS # BLD AUTO: 0.05 K/UL — SIGNIFICANT CHANGE UP (ref 0–0.2)
BASOPHILS NFR BLD AUTO: 0.8 % — SIGNIFICANT CHANGE UP (ref 0–2)
BILIRUB SERPL-MCNC: 0.4 MG/DL — SIGNIFICANT CHANGE UP (ref 0.2–1.2)
BUN SERPL-MCNC: 13 MG/DL — SIGNIFICANT CHANGE UP (ref 7–23)
CALCIUM SERPL-MCNC: 9.5 MG/DL — SIGNIFICANT CHANGE UP (ref 8.4–10.5)
CHLORIDE SERPL-SCNC: 104 MMOL/L — SIGNIFICANT CHANGE UP (ref 96–108)
CHOLEST SERPL-MCNC: 239 MG/DL — HIGH (ref 10–199)
CK SERPL-CCNC: 91 U/L — SIGNIFICANT CHANGE UP (ref 30–200)
CO2 SERPL-SCNC: 26 MMOL/L — SIGNIFICANT CHANGE UP (ref 22–31)
CREAT SERPL-MCNC: 0.87 MG/DL — SIGNIFICANT CHANGE UP (ref 0.5–1.3)
EOSINOPHIL # BLD AUTO: 0.14 K/UL — SIGNIFICANT CHANGE UP (ref 0–0.5)
EOSINOPHIL NFR BLD AUTO: 2.3 % — SIGNIFICANT CHANGE UP (ref 0–6)
GLUCOSE BLDC GLUCOMTR-MCNC: 111 MG/DL — HIGH (ref 70–99)
GLUCOSE BLDC GLUCOMTR-MCNC: 142 MG/DL — HIGH (ref 70–99)
GLUCOSE BLDC GLUCOMTR-MCNC: 72 MG/DL — SIGNIFICANT CHANGE UP (ref 70–99)
GLUCOSE SERPL-MCNC: 75 MG/DL — SIGNIFICANT CHANGE UP (ref 70–99)
HBA1C BLD-MCNC: 5.7 % — HIGH (ref 4–5.6)
HCT VFR BLD CALC: 38.3 % — LOW (ref 39–50)
HDLC SERPL-MCNC: 58 MG/DL — SIGNIFICANT CHANGE UP
HGB BLD-MCNC: 12.9 G/DL — LOW (ref 13–17)
IMM GRANULOCYTES NFR BLD AUTO: 0.2 % — SIGNIFICANT CHANGE UP (ref 0–1.5)
INR BLD: 1.06 — SIGNIFICANT CHANGE UP (ref 0.88–1.16)
LIPID PNL WITH DIRECT LDL SERPL: 153 MG/DL — HIGH
LYMPHOCYTES # BLD AUTO: 2.7 K/UL — SIGNIFICANT CHANGE UP (ref 1–3.3)
LYMPHOCYTES # BLD AUTO: 43.5 % — SIGNIFICANT CHANGE UP (ref 13–44)
MCHC RBC-ENTMCNC: 30.3 PG — SIGNIFICANT CHANGE UP (ref 27–34)
MCHC RBC-ENTMCNC: 33.7 GM/DL — SIGNIFICANT CHANGE UP (ref 32–36)
MCV RBC AUTO: 89.9 FL — SIGNIFICANT CHANGE UP (ref 80–100)
MONOCYTES # BLD AUTO: 0.68 K/UL — SIGNIFICANT CHANGE UP (ref 0–0.9)
MONOCYTES NFR BLD AUTO: 11 % — SIGNIFICANT CHANGE UP (ref 2–14)
NEUTROPHILS # BLD AUTO: 2.63 K/UL — SIGNIFICANT CHANGE UP (ref 1.8–7.4)
NEUTROPHILS NFR BLD AUTO: 42.2 % — LOW (ref 43–77)
NRBC # BLD: 0 /100 WBCS — SIGNIFICANT CHANGE UP (ref 0–0)
PLATELET # BLD AUTO: 436 K/UL — HIGH (ref 150–400)
POTASSIUM SERPL-MCNC: 4.5 MMOL/L — SIGNIFICANT CHANGE UP (ref 3.5–5.3)
POTASSIUM SERPL-SCNC: 4.5 MMOL/L — SIGNIFICANT CHANGE UP (ref 3.5–5.3)
PROT SERPL-MCNC: 7.9 G/DL — SIGNIFICANT CHANGE UP (ref 6–8.3)
PROTHROM AB SERPL-ACNC: 12.1 SEC — SIGNIFICANT CHANGE UP (ref 10–12.9)
RBC # BLD: 4.26 M/UL — SIGNIFICANT CHANGE UP (ref 4.2–5.8)
RBC # FLD: 17.3 % — HIGH (ref 10.3–14.5)
SODIUM SERPL-SCNC: 143 MMOL/L — SIGNIFICANT CHANGE UP (ref 135–145)
TOTAL CHOLESTEROL/HDL RATIO MEASUREMENT: 4.1 RATIO — SIGNIFICANT CHANGE UP (ref 3.4–9.6)
TRIGL SERPL-MCNC: 139 MG/DL — SIGNIFICANT CHANGE UP (ref 10–149)
TROPONIN T SERPL-MCNC: <0.01 NG/ML — SIGNIFICANT CHANGE UP (ref 0–0.01)
WBC # BLD: 6.21 K/UL — SIGNIFICANT CHANGE UP (ref 3.8–10.5)
WBC # FLD AUTO: 6.21 K/UL — SIGNIFICANT CHANGE UP (ref 3.8–10.5)

## 2020-03-06 PROCEDURE — 92928 PRQ TCAT PLMT NTRAC ST 1 LES: CPT | Mod: LC

## 2020-03-06 PROCEDURE — 93571 IV DOP VEL&/PRESS C FLO 1ST: CPT | Mod: 26,RC

## 2020-03-06 PROCEDURE — 93458 L HRT ARTERY/VENTRICLE ANGIO: CPT | Mod: 26,59

## 2020-03-06 RX ORDER — ASPIRIN/CALCIUM CARB/MAGNESIUM 324 MG
243 TABLET ORAL ONCE
Refills: 0 | Status: COMPLETED | OUTPATIENT
Start: 2020-03-06 | End: 2020-03-06

## 2020-03-06 RX ORDER — GABAPENTIN 400 MG/1
600 CAPSULE ORAL THREE TIMES A DAY
Refills: 0 | Status: DISCONTINUED | OUTPATIENT
Start: 2020-03-06 | End: 2020-03-07

## 2020-03-06 RX ORDER — CARVEDILOL PHOSPHATE 80 MG/1
3.12 CAPSULE, EXTENDED RELEASE ORAL EVERY 12 HOURS
Refills: 0 | Status: DISCONTINUED | OUTPATIENT
Start: 2020-03-06 | End: 2020-03-07

## 2020-03-06 RX ORDER — ASPIRIN/CALCIUM CARB/MAGNESIUM 324 MG
81 TABLET ORAL DAILY
Refills: 0 | Status: DISCONTINUED | OUTPATIENT
Start: 2020-03-07 | End: 2020-03-07

## 2020-03-06 RX ORDER — GLUCAGON INJECTION, SOLUTION 0.5 MG/.1ML
1 INJECTION, SOLUTION SUBCUTANEOUS ONCE
Refills: 0 | Status: DISCONTINUED | OUTPATIENT
Start: 2020-03-06 | End: 2020-03-07

## 2020-03-06 RX ORDER — CLOPIDOGREL BISULFATE 75 MG/1
600 TABLET, FILM COATED ORAL ONCE
Refills: 0 | Status: COMPLETED | OUTPATIENT
Start: 2020-03-06 | End: 2020-03-06

## 2020-03-06 RX ORDER — DEXTROSE 50 % IN WATER 50 %
25 SYRINGE (ML) INTRAVENOUS ONCE
Refills: 0 | Status: DISCONTINUED | OUTPATIENT
Start: 2020-03-06 | End: 2020-03-07

## 2020-03-06 RX ORDER — CLOPIDOGREL BISULFATE 75 MG/1
75 TABLET, FILM COATED ORAL DAILY
Refills: 0 | Status: DISCONTINUED | OUTPATIENT
Start: 2020-03-07 | End: 2020-03-07

## 2020-03-06 RX ORDER — ATORVASTATIN CALCIUM 80 MG/1
1 TABLET, FILM COATED ORAL
Qty: 0 | Refills: 0 | DISCHARGE

## 2020-03-06 RX ORDER — ATORVASTATIN CALCIUM 80 MG/1
40 TABLET, FILM COATED ORAL AT BEDTIME
Refills: 0 | Status: DISCONTINUED | OUTPATIENT
Start: 2020-03-06 | End: 2020-03-07

## 2020-03-06 RX ORDER — SODIUM CHLORIDE 9 MG/ML
500 INJECTION INTRAMUSCULAR; INTRAVENOUS; SUBCUTANEOUS
Refills: 0 | Status: DISCONTINUED | OUTPATIENT
Start: 2020-03-06 | End: 2020-03-06

## 2020-03-06 RX ORDER — AMLODIPINE BESYLATE 2.5 MG/1
10 TABLET ORAL DAILY
Refills: 0 | Status: DISCONTINUED | OUTPATIENT
Start: 2020-03-07 | End: 2020-03-07

## 2020-03-06 RX ORDER — DEXTROSE 50 % IN WATER 50 %
15 SYRINGE (ML) INTRAVENOUS ONCE
Refills: 0 | Status: DISCONTINUED | OUTPATIENT
Start: 2020-03-06 | End: 2020-03-07

## 2020-03-06 RX ORDER — LOSARTAN POTASSIUM 100 MG/1
1 TABLET, FILM COATED ORAL
Qty: 0 | Refills: 0 | DISCHARGE

## 2020-03-06 RX ORDER — TAMSULOSIN HYDROCHLORIDE 0.4 MG/1
1 CAPSULE ORAL
Qty: 0 | Refills: 0 | DISCHARGE

## 2020-03-06 RX ORDER — INSULIN LISPRO 100/ML
VIAL (ML) SUBCUTANEOUS
Refills: 0 | Status: DISCONTINUED | OUTPATIENT
Start: 2020-03-06 | End: 2020-03-07

## 2020-03-06 RX ORDER — CARVEDILOL PHOSPHATE 80 MG/1
1 CAPSULE, EXTENDED RELEASE ORAL
Qty: 0 | Refills: 0 | DISCHARGE

## 2020-03-06 RX ORDER — SODIUM CHLORIDE 9 MG/ML
500 INJECTION INTRAMUSCULAR; INTRAVENOUS; SUBCUTANEOUS
Refills: 0 | Status: DISCONTINUED | OUTPATIENT
Start: 2020-03-06 | End: 2020-03-07

## 2020-03-06 RX ORDER — ALLOPURINOL 300 MG
100 TABLET ORAL DAILY
Refills: 0 | Status: DISCONTINUED | OUTPATIENT
Start: 2020-03-06 | End: 2020-03-07

## 2020-03-06 RX ORDER — DEXTROSE 50 % IN WATER 50 %
12.5 SYRINGE (ML) INTRAVENOUS ONCE
Refills: 0 | Status: DISCONTINUED | OUTPATIENT
Start: 2020-03-06 | End: 2020-03-07

## 2020-03-06 RX ORDER — SODIUM CHLORIDE 9 MG/ML
1000 INJECTION, SOLUTION INTRAVENOUS
Refills: 0 | Status: DISCONTINUED | OUTPATIENT
Start: 2020-03-06 | End: 2020-03-07

## 2020-03-06 RX ADMIN — SODIUM CHLORIDE 50 MILLILITER(S): 9 INJECTION INTRAMUSCULAR; INTRAVENOUS; SUBCUTANEOUS at 12:30

## 2020-03-06 RX ADMIN — CARVEDILOL PHOSPHATE 3.12 MILLIGRAM(S): 80 CAPSULE, EXTENDED RELEASE ORAL at 17:35

## 2020-03-06 RX ADMIN — Medication 243 MILLIGRAM(S): at 12:45

## 2020-03-06 RX ADMIN — CLOPIDOGREL BISULFATE 600 MILLIGRAM(S): 75 TABLET, FILM COATED ORAL at 12:45

## 2020-03-06 RX ADMIN — GABAPENTIN 600 MILLIGRAM(S): 400 CAPSULE ORAL at 21:58

## 2020-03-06 RX ADMIN — SODIUM CHLORIDE 75 MILLILITER(S): 9 INJECTION INTRAMUSCULAR; INTRAVENOUS; SUBCUTANEOUS at 15:52

## 2020-03-06 RX ADMIN — ATORVASTATIN CALCIUM 40 MILLIGRAM(S): 80 TABLET, FILM COATED ORAL at 21:58

## 2020-03-06 NOTE — PROGRESS NOTE ADULT - SUBJECTIVE AND OBJECTIVE BOX
Interventional Cardiology Radial band Removal Note    s/p Heparin 			s/p Angiomax    Pt without complaints.  VSS.    Right Radial access site TR Hemoband in place, gumball size hematoma at access site compressed for 10 minutes with resolution of hematoma, no bleed  Radial pulse: intact     Hemostasis achieved with manual release of hemoband.    No  Vasovagal reaction.    Meds given: none         A/P:  s/p Stent   -continue to monitor  -OOB as tolerated  -Post Procedure Instructions given  -Call [phone] if any access site issues.

## 2020-03-07 ENCOUNTER — TRANSCRIPTION ENCOUNTER (OUTPATIENT)
Age: 65
End: 2020-03-07

## 2020-03-07 VITALS
DIASTOLIC BLOOD PRESSURE: 63 MMHG | RESPIRATION RATE: 17 BRPM | SYSTOLIC BLOOD PRESSURE: 106 MMHG | HEART RATE: 80 BPM | OXYGEN SATURATION: 96 %

## 2020-03-07 LAB
ANION GAP SERPL CALC-SCNC: 12 MMOL/L — SIGNIFICANT CHANGE UP (ref 5–17)
BASOPHILS # BLD AUTO: 0.06 K/UL — SIGNIFICANT CHANGE UP (ref 0–0.2)
BASOPHILS NFR BLD AUTO: 1 % — SIGNIFICANT CHANGE UP (ref 0–2)
BUN SERPL-MCNC: 15 MG/DL — SIGNIFICANT CHANGE UP (ref 7–23)
CALCIUM SERPL-MCNC: 9.5 MG/DL — SIGNIFICANT CHANGE UP (ref 8.4–10.5)
CHLORIDE SERPL-SCNC: 103 MMOL/L — SIGNIFICANT CHANGE UP (ref 96–108)
CO2 SERPL-SCNC: 25 MMOL/L — SIGNIFICANT CHANGE UP (ref 22–31)
CREAT SERPL-MCNC: 0.88 MG/DL — SIGNIFICANT CHANGE UP (ref 0.5–1.3)
EOSINOPHIL # BLD AUTO: 0.12 K/UL — SIGNIFICANT CHANGE UP (ref 0–0.5)
EOSINOPHIL NFR BLD AUTO: 1.9 % — SIGNIFICANT CHANGE UP (ref 0–6)
GLUCOSE BLDC GLUCOMTR-MCNC: 95 MG/DL — SIGNIFICANT CHANGE UP (ref 70–99)
GLUCOSE SERPL-MCNC: 99 MG/DL — SIGNIFICANT CHANGE UP (ref 70–99)
HCT VFR BLD CALC: 38.6 % — LOW (ref 39–50)
HCV AB S/CO SERPL IA: 0.16 S/CO — SIGNIFICANT CHANGE UP
HCV AB SERPL-IMP: SIGNIFICANT CHANGE UP
HGB BLD-MCNC: 13.1 G/DL — SIGNIFICANT CHANGE UP (ref 13–17)
IMM GRANULOCYTES NFR BLD AUTO: 0.2 % — SIGNIFICANT CHANGE UP (ref 0–1.5)
LYMPHOCYTES # BLD AUTO: 2.1 K/UL — SIGNIFICANT CHANGE UP (ref 1–3.3)
LYMPHOCYTES # BLD AUTO: 33.3 % — SIGNIFICANT CHANGE UP (ref 13–44)
MAGNESIUM SERPL-MCNC: 2 MG/DL — SIGNIFICANT CHANGE UP (ref 1.6–2.6)
MCHC RBC-ENTMCNC: 30.4 PG — SIGNIFICANT CHANGE UP (ref 27–34)
MCHC RBC-ENTMCNC: 33.9 GM/DL — SIGNIFICANT CHANGE UP (ref 32–36)
MCV RBC AUTO: 89.6 FL — SIGNIFICANT CHANGE UP (ref 80–100)
MONOCYTES # BLD AUTO: 0.75 K/UL — SIGNIFICANT CHANGE UP (ref 0–0.9)
MONOCYTES NFR BLD AUTO: 11.9 % — SIGNIFICANT CHANGE UP (ref 2–14)
NEUTROPHILS # BLD AUTO: 3.26 K/UL — SIGNIFICANT CHANGE UP (ref 1.8–7.4)
NEUTROPHILS NFR BLD AUTO: 51.7 % — SIGNIFICANT CHANGE UP (ref 43–77)
NRBC # BLD: 0 /100 WBCS — SIGNIFICANT CHANGE UP (ref 0–0)
PLATELET # BLD AUTO: 423 K/UL — HIGH (ref 150–400)
POTASSIUM SERPL-MCNC: 4.1 MMOL/L — SIGNIFICANT CHANGE UP (ref 3.5–5.3)
POTASSIUM SERPL-SCNC: 4.1 MMOL/L — SIGNIFICANT CHANGE UP (ref 3.5–5.3)
RBC # BLD: 4.31 M/UL — SIGNIFICANT CHANGE UP (ref 4.2–5.8)
RBC # FLD: 17.6 % — HIGH (ref 10.3–14.5)
SODIUM SERPL-SCNC: 140 MMOL/L — SIGNIFICANT CHANGE UP (ref 135–145)
WBC # BLD: 6.3 K/UL — SIGNIFICANT CHANGE UP (ref 3.8–10.5)
WBC # FLD AUTO: 6.3 K/UL — SIGNIFICANT CHANGE UP (ref 3.8–10.5)

## 2020-03-07 PROCEDURE — 83036 HEMOGLOBIN GLYCOSYLATED A1C: CPT

## 2020-03-07 PROCEDURE — 85025 COMPLETE CBC W/AUTO DIFF WBC: CPT

## 2020-03-07 PROCEDURE — 85730 THROMBOPLASTIN TIME PARTIAL: CPT

## 2020-03-07 PROCEDURE — 83735 ASSAY OF MAGNESIUM: CPT

## 2020-03-07 PROCEDURE — 80061 LIPID PANEL: CPT

## 2020-03-07 PROCEDURE — C1894: CPT

## 2020-03-07 PROCEDURE — C1887: CPT

## 2020-03-07 PROCEDURE — 84484 ASSAY OF TROPONIN QUANT: CPT

## 2020-03-07 PROCEDURE — 99239 HOSP IP/OBS DSCHRG MGMT >30: CPT

## 2020-03-07 PROCEDURE — C1725: CPT

## 2020-03-07 PROCEDURE — 82962 GLUCOSE BLOOD TEST: CPT

## 2020-03-07 PROCEDURE — 36415 COLL VENOUS BLD VENIPUNCTURE: CPT

## 2020-03-07 PROCEDURE — 80053 COMPREHEN METABOLIC PANEL: CPT

## 2020-03-07 PROCEDURE — 85610 PROTHROMBIN TIME: CPT

## 2020-03-07 PROCEDURE — 82550 ASSAY OF CK (CPK): CPT

## 2020-03-07 PROCEDURE — 86803 HEPATITIS C AB TEST: CPT

## 2020-03-07 PROCEDURE — C1874: CPT

## 2020-03-07 PROCEDURE — C1769: CPT

## 2020-03-07 PROCEDURE — 80048 BASIC METABOLIC PNL TOTAL CA: CPT

## 2020-03-07 RX ORDER — CLOPIDOGREL BISULFATE 75 MG/1
1 TABLET, FILM COATED ORAL
Qty: 30 | Refills: 5
Start: 2020-03-07 | End: 2020-09-02

## 2020-03-07 RX ORDER — CARVEDILOL PHOSPHATE 80 MG/1
1 CAPSULE, EXTENDED RELEASE ORAL
Qty: 60 | Refills: 2
Start: 2020-03-07 | End: 2020-06-04

## 2020-03-07 RX ORDER — METFORMIN HYDROCHLORIDE 850 MG/1
1 TABLET ORAL
Qty: 0 | Refills: 0 | DISCHARGE

## 2020-03-07 RX ORDER — CARVEDILOL PHOSPHATE 80 MG/1
1 CAPSULE, EXTENDED RELEASE ORAL
Qty: 0 | Refills: 0 | DISCHARGE

## 2020-03-07 RX ORDER — ATORVASTATIN CALCIUM 80 MG/1
1 TABLET, FILM COATED ORAL
Qty: 30 | Refills: 2
Start: 2020-03-07 | End: 2020-06-04

## 2020-03-07 RX ORDER — ATORVASTATIN CALCIUM 80 MG/1
1 TABLET, FILM COATED ORAL
Qty: 0 | Refills: 0 | DISCHARGE

## 2020-03-07 RX ADMIN — GABAPENTIN 600 MILLIGRAM(S): 400 CAPSULE ORAL at 05:37

## 2020-03-07 RX ADMIN — CARVEDILOL PHOSPHATE 3.12 MILLIGRAM(S): 80 CAPSULE, EXTENDED RELEASE ORAL at 05:37

## 2020-03-07 RX ADMIN — AMLODIPINE BESYLATE 10 MILLIGRAM(S): 2.5 TABLET ORAL at 05:37

## 2020-03-07 RX ADMIN — CLOPIDOGREL BISULFATE 75 MILLIGRAM(S): 75 TABLET, FILM COATED ORAL at 09:04

## 2020-03-07 RX ADMIN — Medication 100 MILLIGRAM(S): at 09:04

## 2020-03-07 RX ADMIN — Medication 81 MILLIGRAM(S): at 09:04

## 2020-03-07 NOTE — DISCHARGE NOTE NURSING/CASE MANAGEMENT/SOCIAL WORK - PATIENT PORTAL LINK FT
You can access the FollowMyHealth Patient Portal offered by [facility] by registering at the following website: [email or web address]. By joining [employer]’s FollowMyHealth portal, you will also be able to view your health information using other applications (apps) compatible with our system.

## 2020-03-07 NOTE — DISCHARGE NOTE PROVIDER - CARE PROVIDER_API CALL
Kvng Farfan)  Cardiology; Internal Medicine  [address] [address]  Phone: [phone]  Fax: [phone]  Follow Up Time: 2 weeks    Brianna Tsai ()  [facility]  [address] [place] [address]  [address] [address]  Phone: [phone]  Fax: [phone]  Follow Up Time:

## 2020-03-07 NOTE — DISCHARGE NOTE PROVIDER - NSDCFUSCHEDAPPT_GEN_ALL_CORE_FT
TARA WARE ; 04/06/2020 ; NPP PulmMed [address] [facility]  TARA WARE ; 04/15/2020 ; NPP [facility] Tommy Garcia TARA WARE ; 04/06/2020 ; NPP PulmMed [address] [facility] 85[facility]  TARA WARE ; 04/15/2020 ; NPP [facility] Tommy Garcia TARA WARE ; 04/06/2020 ; NPP PulmMed [address] [facility]  TARA WARE ; 04/15/2020 ; NPP [facility] Tommy Garcia TARA WARE ; 04/06/2020 ; NPP PulmMed [address] [facility] 85[facility]  TARA WARE ; 04/15/2020 ; [facility] Tommy Garcia TARA WARE ; 04/06/2020 ; NPP PulmMed [address] [facility]  TARA WARE ; 04/15/2020 ; NPP Orth[facility] Tommy Garcia

## 2020-03-07 NOTE — DISCHARGE NOTE PROVIDER - NSDCCPCAREPLAN_GEN_ALL_CORE_FT
PRINCIPAL DISCHARGE DIAGNOSIS  Diagnosis: CAD (coronary artery disease)  Assessment and Plan of Treatment: You have a diagnosis of coronary artery disease and received a stent to your mid-left circumflex coronary artery.  You have been started on Aspirin 81mg daily and Plavix (Clopidogrel) 75mg daily daily. You MUST continue taking the daily Aspirin and Plavix to ensure your stent does not close. DO NOT STOP THESE MEDICATIONS FOR ANY REASON UNLESS OTHERWISE INDICATED BY YOUR CARDIOLOGIST BECAUSE THIS WILL PUT YOU AT RISK FOR A HEART ATTACK. You should refrain from strenuous activity and heavy lifting for 1 week. Please make a follow up appointment with your cardiologist within 1-2 weeks of your discharge. All of your prescriptions have been sent electronically to your pharmacy.     The catheter from your wrist was removed and bleeding was stopped by manual pressure.  Wash the site daily with soap and water.  There is no need to put on a bandage.      Call the Interventional Cardiology and Vascular Team at [phone] or [phone] if any of following occur pertaining to your vascular access site: Bleeding or hematoma formation (collection of blood under the skin), drainage or redness at the puncture site, numbness, decrease in strength, coolness or pale coloration of skin of the leg or hand.      SECONDARY DISCHARGE DIAGNOSES  Diagnosis: Hypertension  Assessment and Plan of Treatment:     Diagnosis: Hyperlipidemia  Assessment and Plan of Treatment:     Diagnosis: Type II diabetes mellitus  Assessment and Plan of Treatment:     Diagnosis: Peripheral artery disease  Assessment and Plan of Treatment:     Diagnosis: Gout  Assessment and Plan of Treatment: PRINCIPAL DISCHARGE DIAGNOSIS  Diagnosis: CAD (coronary artery disease)  Assessment and Plan of Treatment: You have a diagnosis of coronary artery disease and received a stent to your mid-left circumflex coronary artery.  You have been started on Aspirin 81mg daily and Plavix (Clopidogrel) 75mg daily daily. As per Dr. Reyes's recommendations, you are to continue taking this regimen of Aspirin and Plavix daily for six months, and then continue to take Aspirin 81 mg daily indefinitely. You MUST continue taking the daily Aspirin and Plavix to ensure your stent does not close. DO NOT STOP THESE MEDICATIONS FOR ANY REASON UNLESS OTHERWISE INDICATED BY YOUR CARDIOLOGIST BECAUSE THIS WILL PUT YOU AT RISK FOR A HEART ATTACK. You should refrain from strenuous activity and heavy lifting for 1 week. Please make a follow up appointment with your cardiologist within 1-2 weeks of your discharge. All of your prescriptions have been sent electronically to your pharmacy.     The catheter from your wrist was removed and bleeding was stopped by manual pressure.  Wash the site daily with soap and water.  There is no need to put on a bandage.      Call the Interventional Cardiology and Vascular Team at [phone] or [phone] if any of following occur pertaining to your vascular access site: Bleeding or hematoma formation (collection of blood under the skin), drainage or redness at the puncture site, numbness, decrease in strength, coolness or pale coloration of skin of the leg or hand.      SECONDARY DISCHARGE DIAGNOSES  Diagnosis: Hypertension  Assessment and Plan of Treatment: You have a history of being diagnosed with high blood pressure. During your admission, your systolic blood pressure has ranged from the 110's to 150's. You should continue with your home medication regimen of Amlodipine 10 mg daily, Carvedilol 3.125 mg twice daily, and Losartan 100 mg daily. You should adhere to this medication regimen in order to prevent progression of your disease. You should also continue to follow up with your outpatient provider for further management of this diagnosis.    Diagnosis: Hyperlipidemia  Assessment and Plan of Treatment: You have a history of being diagnosed with high cholesterol. During your admission, your cholesterol was 239, triglycerides were 139, HDL was 58, and LDL was 153. You should continue with your home medication regimen of Atorvastatin 40 mg daily at bedtime and Zetia 10 mg daily. You should adhere to this medication regimen in order to prevent progression of your disease. You should also continue to follow up with your outpatient provider for further management of this diagnosis.    Diagnosis: Type II diabetes mellitus  Assessment and Plan of Treatment: You have a history of being diagnosed with type II diabetes mellitus. During your admission, your hemoglobin A1C was noted to be 5.7. Hemoglobin A1C is a reflection of your blood sugar levels over the last few months. You should continue with your home medication regimen of Metformin 500 mg twice a day starting on 03/09/2020. DO NOT continue this medication regimen before 03/09/2020. You should also continue with your regimen of Gabapentin 300 mg 2 capsules three times a day for neuropathy symptoms. You should adhere to this medication regimen in order to prevent progression of your disease. You should also continue to follow up with your outpatient provider for further management of this diagnosis.    Diagnosis: Peripheral artery disease  Assessment and Plan of Treatment: You have a history of being diagnosed with peripheral artery disease. You should continue with your home medication regimen of Gabapentin 300 mg 2 capsules three times a day to prevent progression of this disease. You should also continue to follow up with your outpatient provider for further management of this diagnosis.    Diagnosis: Gout  Assessment and Plan of Treatment: You have a history of being diagnosed with gout. You should continue your home medication regimen of Allopurinol 100 mg daily to prevent progression of this disease. You should also continue to follow up with your outpatient provider for further management of this diagnosis. PRINCIPAL DISCHARGE DIAGNOSIS  Diagnosis: CAD (coronary artery disease)  Assessment and Plan of Treatment: You have a diagnosis of coronary artery disease and received a stent to your mid-left circumflex coronary artery.  You have been started on Aspirin 81mg daily and Plavix (Clopidogrel) 75mg daily daily. As per Dr. Reyes's recommendations, you are to continue taking this regimen of Aspirin and Plavix daily for six months, and then continue to take Aspirin 81 mg daily indefinitely. You MUST continue taking the daily Aspirin and Plavix to ensure your stent does not close. DO NOT STOP THESE MEDICATIONS FOR ANY REASON UNLESS OTHERWISE INDICATED BY YOUR CARDIOLOGIST BECAUSE THIS WILL PUT YOU AT RISK FOR A HEART ATTACK. You should refrain from strenuous activity and heavy lifting for 1 week. Please make a follow up appointment with your cardiologist within 1-2 weeks of your discharge. All of your prescriptions have been sent electronically to your pharmacy.     The catheter from your wrist was removed and bleeding was stopped by manual pressure.  Wash the site daily with soap and water.  There is no need to put on a bandage.      Call the Interventional Cardiology and Vascular Team at [phone] or [phone] if any of following occur pertaining to your vascular access site: Bleeding or hematoma formation (collection of blood under the skin), drainage or redness at the puncture site, numbness, decrease in strength, coolness or pale coloration of skin of the leg or hand.      SECONDARY DISCHARGE DIAGNOSES  Diagnosis: Hypertension  Assessment and Plan of Treatment: You have a history of being diagnosed with high blood pressure. During your admission, your systolic blood pressure has ranged from the 110's to 150's.  You were previously taking Carvedilol 3.125 mg twice daily, and we have increased this medication to Carvedilol 6.25 mg twice daily. You should also continue with your home medication regimen of Amlodipine 10 mg daily and Losartan 100 mg daily. You should adhere to this medication regimen in order to prevent progression of your disease. You should also continue to follow up with your outpatient provider for further management of this diagnosis.    Diagnosis: Hyperlipidemia  Assessment and Plan of Treatment: You have a history of being diagnosed with high cholesterol. During your admission, your cholesterol was 239, triglycerides were 139, HDL was 58, and LDL was 153. You were previously taking Atorvastatin 40 mg daily at bedtime, and we have increased this medication to Atorvastatin 80 mg daily at bedtime. You should also continue with your home medication regimen of Zetia 10 mg daily. You should adhere to this medication regimen in order to prevent progression of your disease. You should also continue to follow up with your outpatient provider for further management of this diagnosis.    Diagnosis: Type II diabetes mellitus  Assessment and Plan of Treatment: You have a history of being diagnosed with type II diabetes mellitus. During your admission, your hemoglobin A1C was noted to be 5.7. Hemoglobin A1C is a reflection of your blood sugar levels over the last few months. You should continue with your home medication regimen of Metformin 500 mg twice a day starting on 03/09/2020. DO NOT continue this medication regimen before 03/09/2020. You should also continue with your regimen of Gabapentin 300 mg 2 capsules three times a day for neuropathy symptoms. You should adhere to this medication regimen in order to prevent progression of your disease. You should also continue to follow up with your outpatient provider for further management of this diagnosis.    Diagnosis: Peripheral artery disease  Assessment and Plan of Treatment: You have a history of being diagnosed with peripheral artery disease. You should continue with your home medication regimen of Gabapentin 300 mg 2 capsules three times a day to prevent progression of this disease. You should also continue to follow up with your outpatient provider for further management of this diagnosis.    Diagnosis: Gout  Assessment and Plan of Treatment: You have a history of being diagnosed with gout. You should continue your home medication regimen of Allopurinol 100 mg daily to prevent progression of this disease. You should also continue to follow up with your outpatient provider for further management of this diagnosis.

## 2020-03-07 NOTE — DISCHARGE NOTE PROVIDER - HOSPITAL COURSE
64 y/o Male, former smoker, with FHx of MI (grandmother  of MI, age 88) and PMHx of CAD (s/p PCI/NASRA of dRCA, mLAD/D3 bifurcation in , w/ subsequent dx cath in ), HTN, HLD, NIDDM, Mixed Cardiomyopathy (EF 37% w/ transmural scar by Cardiac MRI in 2018 and EF 50-55% by Echo in 2019), h/o multiple syncopal episodes (w/ presumed cardiac etiology), PAD (s/p PTA/orbital atherectomy of LSFA in ), and Gout, who presented to his cardiologist, Dr. Farfan, after syncopal episode approximately 3 weeks ago. Patient states he had just gotten up from bed and went to make coffee when he began experiencing dizziness and SOB. Patient states he braced himself on the counter, but had LOC and found himself on the floor w/ no postictal confusion or incontinence. Pt was seen by PCP, Dr. Tsai, that day and was recommended to present to ED, but patient refused. Patient reported another syncopal episode w/o prodromal symptoms a few prior as well. Per recent EP note in [employer]scripts (2020), patient was given the option of proceeding directly to ICD placement without an EP study. As per Dr. Farfan's office note, pt may have sarcoid or amyloid and will be checked for TTR amyloid. Pt was also referred to pulmonary for possible sarcoid w/u, but pt has yet to schedule an appointment. Pt endorses occasional episodes of random palpitations occurring independent of exertion. Additionally, as per MD note, pt endorsed some chest discomfort occurring independent of exertion, but mostly with walking. On interview with PA, pt denies CP/discomfort, SOB, fevers, chills, cough, HA, PND, orthopnea, LE edema, n/v/d, melena, BRBPR, or any other symptoms at this time. Pharm Stress Test on 9/10/18 revealed no scan evidence of stress induced myocardial ischemia, decreased tracer concentration noted in the inferoapical wall c/w attenuation due to soft tissue or possibly scarring, EF 40% w/ global hypokinesis, normal stress EKG. Cardiac MRI on 18 revealed diffuse mild hypokinesis of LV walls w/ severe hypokinesis of the basal to apical inferolateral wall, EF 37%, mild hypertrophy of basal septal wall, RVEF 42%, transmural scar in the basal inferolateral wall w/ hyperenhancement in the basal/mid-septal and entire inferior region contiguously likely representing an atypical pattern of non-ischemic etiology (possibly sarcoidosis or prior myocarditis). Echo on 19 revealed EF 50-55%, mild symmetric LVH, trace MR, trace TR.    In light of pt's risk factors, known CAD history, likely cardiac etiology of syncopal episodes, and abnormal cardiac imaging, pt now presents to [facility] for recommended cardiac catheterization with possible intervention if clinically indicated.        Last Cath on 2/25/15 at [facility]: dLM 20%, mLAD stent widely patent, oD3 80% (small vessel, no change from prior cath), OM1 40%, mRCA 40%, EDP 9, EF 50%, normal wall motion, no AS, no MR. RHC: mild PHTN, RA 4, RV 38/0, PA 29, PWCP 6. 64 y/o Male, former smoker, with FHx of MI (grandmother  of MI, age 88) and PMHx of CAD (s/p PCI/NASRA of dRCA, mLAD/D3 bifurcation in , w/ subsequent dx cath in ), HTN, HLD, NIDDM, Mixed Cardiomyopathy (EF 37% w/ transmural scar by Cardiac MRI in 2018 and EF 50-55% by Echo in 2019), h/o multiple syncopal episodes (w/ presumed cardiac etiology), PAD (s/p PTA/orbital atherectomy of LSFA in ), and Gout, who presented to his cardiologist, Dr. Farfan, after syncopal episode approximately 3 weeks ago. Patient states he had just gotten up from bed and went to make coffee when he began experiencing dizziness and SOB. Patient states he braced himself on the counter, but had LOC and found himself on the floor w/ no postictal confusion or incontinence. Pt was seen by PCP, Dr. Tsai, that day and was recommended to present to ED, but patient refused. Patient reported another syncopal episode w/o prodromal symptoms a few prior as well. Per recent EP note in [employer]scripts (2020), patient was given the option of proceeding directly to ICD placement without an EP study. As per Dr. Farfan's office note, patient may have sarcoid or amyloid and will be checked for TTR amyloid. Patient was also referred to pulmonary for possible sarcoid w/u, but patient has yet to schedule an appointment. Patient endorsed occassional palpitations independent of exertion and some chest discomfort mostly associated with walking but independent of exertion at times. Patient denies SOB, fevers, chills, cough, HA, PND, orthopnea, LE edema, n/v/d, melena, BRBPR, or any other symptoms at this time. Pharm Stress Test 09/10/2018 revealed no scan evidence of stress induced myocardial ischemia, decreased tracer concentration noted in the inferoapical wall c/w attenuation due to soft tissue or possibly scarring, EF 40% w/ global hypokinesis, normal stress EKG. Cardiac MRI 2018 revealed diffuse mild hypokinesis of LV walls w/ severe hypokinesis of the basal to apical inferolateral wall, EF 37%, mild hypertrophy of basal septal wall, RVEF 42%, transmural scar in the basal inferolateral wall w/ hyperenhancement in the basal/mid-septal and entire inferior region contiguously likely representing an atypical pattern of non-ischemic etiology (possibly sarcoidosis or prior myocarditis). Echo 2019 revealed EF 50-55%, mild symmetric LVH, trace MR, trace TR. Patient now s/p cardiac cath 2020 which revealed 2V CAD w/ intervention NASRA mLCx (90%), FFR mRCA (60%) 0.98 (baseline) to 0.85, ostial ramus 30-50%, widely patent LAD stent. Right radial access was used for the procedure. Of note, patient developed a hematoma at the access site after the radial band was removed and required compression for 10 minutes. Patient is w/o complaints on 2020 AM and wrist remains stable w/o bleeding or hematoma recurrence. 66 y/o Male, former smoker, with FHx of MI (grandmother  of MI, age 88) and PMHx of CAD (s/p PCI/NASRA of dRCA, mLAD/D3 bifurcation in , w/ subsequent dx cath in ), HTN, HLD, NIDDM, Mixed Cardiomyopathy (EF 37% w/ transmural scar by Cardiac MRI in 2018 and EF 50-55% by Echo in 2019), h/o multiple syncopal episodes (w/ presumed cardiac etiology), PAD (s/p PTA/orbital atherectomy of LSFA in ), and Gout, who presented to his cardiologist, Dr. Farfan, after syncopal episode approximately 3 weeks ago. Patient states he had just gotten up from bed and went to make coffee when he began experiencing dizziness and SOB. Patient states he braced himself on the counter, but had LOC and found himself on the floor w/ no postictal confusion or incontinence. Pt was seen by PCP, Dr. Tsai, that day and was recommended to present to ED, but patient refused. Patient reported another syncopal episode w/o prodromal symptoms a few prior as well. Per recent EP note in [employer]scripts (2020), patient was given the option of proceeding directly to ICD placement without an EP study. As per Dr. Farfan's office note, patient may have sarcoid or amyloid and will be checked for TTR amyloid. Patient was also referred to pulmonary for possible sarcoid w/u, but patient has yet to schedule an appointment. Patient endorsed occassional palpitations independent of exertion and some chest discomfort mostly associated with walking but independent of exertion at times. Patient denies SOB, fevers, chills, cough, HA, PND, orthopnea, LE edema, n/v/d, melena, BRBPR, or any other symptoms at this time. Pharm Stress Test 09/10/2018 revealed no scan evidence of stress induced myocardial ischemia, decreased tracer concentration noted in the inferoapical wall c/w attenuation due to soft tissue or possibly scarring, EF 40% w/ global hypokinesis, normal stress EKG. Cardiac MRI 2018 revealed diffuse mild hypokinesis of LV walls w/ severe hypokinesis of the basal to apical inferolateral wall, EF 37%, mild hypertrophy of basal septal wall, RVEF 42%, transmural scar in the basal inferolateral wall w/ hyperenhancement in the basal/mid-septal and entire inferior region contiguously likely representing an atypical pattern of non-ischemic etiology (possibly sarcoidosis or prior myocarditis). Echo 2019 revealed EF 50-55%, mild symmetric LVH, trace MR, trace TR. Patient now s/p cardiac cath 2020 which revealed 2V CAD w/ intervention NASRA mLCx (90%), FFR mRCA (60%) 0.98 (baseline) to 0.85, ostial ramus 30-50%, widely patent LAD stent. Right radial access was used for the procedure. Of note, patient developed a hematoma at the access site after the radial band was removed and required compression for 10 minutes. Patient is w/o complaints on 2020 AM and wrist remains stable w/o bleeding or hematoma recurrence.         No significant events on telemetry overnight. Repeat EKG without ischemic changes. Patient has been medically cleared for discharge as per Dr. Pantoja. Patient has been given appropriate discharge instructions including medication regimen, access site management and follow up. Medications that patient needs refills on have been e-prescribed to preferred pharmacy.         Gen: NAD, A&O x3    Cards: RRR, clear S1 and S2 without murmur    Pulm: CTA B/L without w/r/r    Right Wrist: No hematoma or ooze, peripheral pulses 2+ B/L    Abd: soft, NT    Ext: no LE edema or ulcerations B/L

## 2020-03-07 NOTE — DISCHARGE NOTE PROVIDER - NSDCMRMEDTOKEN_GEN_ALL_CORE_FT
allopurinol 100 mg oral tablet: 1 tab(s) orally once a day  amLODIPine 10 mg oral tablet: 1 tab(s) orally once a day  aspirin 81 mg oral tablet: 1 tab(s) orally once a day  atorvastatin 40 mg oral tablet: 1 tab(s) orally once a day  carvedilol 3.125 mg oral tablet: 1 tab(s) orally 2 times a day  gabapentin 300 mg oral capsule: 2 cap(s) orally 3 times a day  losartan 100 mg oral tablet: 1 tab(s) orally once a day  metFORMIN 500 mg oral tablet: 1 tab(s) orally 2 times a day  Zetia 10 mg oral tablet: 1 tab(s) orally once a day allopurinol 100 mg oral tablet: 1 tab(s) orally once a day  amLODIPine 10 mg oral tablet: 1 tab(s) orally once a day  aspirin 81 mg oral tablet: 1 tab(s) orally once a day  atorvastatin 40 mg oral tablet: 1 tab(s) orally once a day  carvedilol 3.125 mg oral tablet: 1 tab(s) orally 2 times a day  clopidogrel 75 mg oral tablet: 1 tab(s) orally once a day  gabapentin 300 mg oral capsule: 2 cap(s) orally 3 times a day  losartan 100 mg oral tablet: 1 tab(s) orally once a day  metFORMIN 500 mg oral tablet: 1 tab(s) orally 2 times a day. Continue taking on 03/09/2020, DO NOT take before that time.   Zetia 10 mg oral tablet: 1 tab(s) orally once a day allopurinol 100 mg oral tablet: 1 tab(s) orally once a day  amLODIPine 10 mg oral tablet: 1 tab(s) orally once a day  aspirin 81 mg oral tablet: 1 tab(s) orally once a day  atorvastatin 80 mg oral tablet: 1 tab(s) orally once a day  carvedilol 6.25 mg oral tablet: 1 tab(s) orally 2 times a day  clopidogrel 75 mg oral tablet: 1 tab(s) orally once a day  gabapentin 300 mg oral capsule: 2 cap(s) orally 3 times a day  losartan 100 mg oral tablet: 1 tab(s) orally once a day  metFORMIN 500 mg oral tablet: 1 tab(s) orally 2 times a day. Continue taking on 03/09/2020, DO NOT take before that time.   Zetia 10 mg oral tablet: 1 tab(s) orally once a day

## 2020-03-07 NOTE — DISCHARGE NOTE PROVIDER - CARE PROVIDERS DIRECT ADDRESSES
,[email or web address]

## 2020-03-09 RX ORDER — METFORMIN HYDROCHLORIDE 850 MG/1
1 TABLET ORAL
Qty: 0 | Refills: 0 | DISCHARGE
Start: 2020-03-09

## 2020-03-13 DIAGNOSIS — I10 ESSENTIAL (PRIMARY) HYPERTENSION: ICD-10-CM
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
## 2020-03-13 DIAGNOSIS — E11.51 TYPE 2 DIABETES MELLITUS WITH DIABETIC PERIPHERAL ANGIOPATHY WITHOUT GANGRENE: ICD-10-CM

## 2020-03-13 DIAGNOSIS — I25.10 ATHEROSCLEROTIC HEART DISEASE OF NATIVE CORONARY ARTERY WITHOUT ANGINA PECTORIS: ICD-10-CM

## 2020-03-13 DIAGNOSIS — Z79.02 LONG TERM (CURRENT) USE OF ANTITHROMBOTICS/ANTIPLATELETS: ICD-10-CM

## 2020-03-13 DIAGNOSIS — Z79.82 LONG TERM (CURRENT) USE OF ASPIRIN: ICD-10-CM

## 2020-03-13 DIAGNOSIS — Z90.81 ACQUIRED ABSENCE OF SPLEEN: ICD-10-CM

## 2020-03-13 DIAGNOSIS — I97.630 POSTPROCEDURAL HEMATOMA OF A CIRCULATORY SYSTEM ORGAN OR STRUCTURE FOLLOWING A CARDIAC CATHETERIZATION: ICD-10-CM

## 2020-03-13 DIAGNOSIS — Z95.5 PRESENCE OF CORONARY ANGIOPLASTY IMPLANT AND GRAFT: ICD-10-CM

## 2020-03-13 DIAGNOSIS — I25.110 ATHEROSCLEROTIC HEART DISEASE OF NATIVE CORONARY ARTERY WITH UNSTABLE ANGINA PECTORIS: ICD-10-CM

## 2020-03-13 DIAGNOSIS — Z87.891 PERSONAL HISTORY OF NICOTINE DEPENDENCE: ICD-10-CM

## 2020-03-13 DIAGNOSIS — I27.20 PULMONARY HYPERTENSION, UNSPECIFIED: ICD-10-CM

## 2020-03-13 DIAGNOSIS — E78.5 HYPERLIPIDEMIA, UNSPECIFIED: ICD-10-CM

## 2020-03-13 DIAGNOSIS — M10.9 GOUT, UNSPECIFIED: ICD-10-CM

## 2020-04-06 ENCOUNTER — APPOINTMENT (OUTPATIENT)
Dept: INTERNAL MEDICINE | Facility: CLINIC | Age: 65
End: 2020-04-06

## 2020-04-15 ENCOUNTER — APPOINTMENT (OUTPATIENT)
Dept: INTERNAL MEDICINE | Facility: CLINIC | Age: 65
End: 2020-04-15

## 2020-04-29 ENCOUNTER — RX RENEWAL (OUTPATIENT)
Age: 65
End: 2020-04-29

## 2020-05-07 ENCOUNTER — APPOINTMENT (OUTPATIENT)
Dept: HEART AND VASCULAR | Facility: CLINIC | Age: 65
End: 2020-05-07

## 2020-05-07 ENCOUNTER — APPOINTMENT (OUTPATIENT)
Dept: INTERNAL MEDICINE | Facility: CLINIC | Age: 65
End: 2020-05-07
Payer: MEDICARE

## 2020-05-07 DIAGNOSIS — R63.5 ABNORMAL WEIGHT GAIN: ICD-10-CM

## 2020-05-07 PROCEDURE — 99214 OFFICE O/P EST MOD 30 MIN: CPT | Mod: 95

## 2020-05-11 ENCOUNTER — APPOINTMENT (OUTPATIENT)
Dept: HEART AND VASCULAR | Facility: CLINIC | Age: 65
End: 2020-05-11
Payer: MEDICARE

## 2020-05-11 PROCEDURE — 99214 OFFICE O/P EST MOD 30 MIN: CPT | Mod: 95

## 2020-05-18 ENCOUNTER — APPOINTMENT (OUTPATIENT)
Dept: HEART AND VASCULAR | Facility: CLINIC | Age: 65
End: 2020-05-18
Payer: MEDICARE

## 2020-05-18 PROCEDURE — 99214 OFFICE O/P EST MOD 30 MIN: CPT | Mod: 95

## 2020-05-21 ENCOUNTER — APPOINTMENT (OUTPATIENT)
Dept: HEART AND VASCULAR | Facility: CLINIC | Age: 65
End: 2020-05-21
Payer: MEDICARE

## 2020-05-21 PROCEDURE — 36415 COLL VENOUS BLD VENIPUNCTURE: CPT

## 2020-05-21 PROCEDURE — 0298T: CPT

## 2020-05-22 LAB
ALBUMIN SERPL ELPH-MCNC: 4.5 G/DL
ALP BLD-CCNC: 69 U/L
ALT SERPL-CCNC: 23 U/L
ANION GAP SERPL CALC-SCNC: 13 MMOL/L
AST SERPL-CCNC: 20 U/L
BASOPHILS # BLD AUTO: 0.09 K/UL
BASOPHILS NFR BLD AUTO: 1.2 %
BILIRUB SERPL-MCNC: 0.6 MG/DL
BUN SERPL-MCNC: 10 MG/DL
CALCIUM SERPL-MCNC: 10.4 MG/DL
CHLORIDE SERPL-SCNC: 102 MMOL/L
CHOLEST SERPL-MCNC: 141 MG/DL
CHOLEST/HDLC SERPL: 2.9 RATIO
CO2 SERPL-SCNC: 28 MMOL/L
CREAT SERPL-MCNC: 0.95 MG/DL
EOSINOPHIL # BLD AUTO: 0.12 K/UL
EOSINOPHIL NFR BLD AUTO: 1.6 %
ESTIMATED AVERAGE GLUCOSE: 123 MG/DL
GLUCOSE SERPL-MCNC: 94 MG/DL
HBA1C MFR BLD HPLC: 5.9 %
HCT VFR BLD CALC: 42 %
HDLC SERPL-MCNC: 48 MG/DL
HGB BLD-MCNC: 13.7 G/DL
IMM GRANULOCYTES NFR BLD AUTO: 0.1 %
LDLC SERPL CALC-MCNC: 73 MG/DL
LYMPHOCYTES # BLD AUTO: 3.67 K/UL
LYMPHOCYTES NFR BLD AUTO: 47.7 %
MAN DIFF?: NORMAL
MCHC RBC-ENTMCNC: 30.5 PG
MCHC RBC-ENTMCNC: 32.6 GM/DL
MCV RBC AUTO: 93.5 FL
MONOCYTES # BLD AUTO: 0.87 K/UL
MONOCYTES NFR BLD AUTO: 11.3 %
NEUTROPHILS # BLD AUTO: 2.94 K/UL
NEUTROPHILS NFR BLD AUTO: 38.1 %
NT-PROBNP SERPL-MCNC: 207 PG/ML
PLATELET # BLD AUTO: 404 K/UL
POTASSIUM SERPL-SCNC: 5 MMOL/L
PROT SERPL-MCNC: 7.6 G/DL
RBC # BLD: 4.49 M/UL
RBC # FLD: 14.9 %
SODIUM SERPL-SCNC: 142 MMOL/L
T4 FREE SERPL-MCNC: 1.2 NG/DL
TRIGL SERPL-MCNC: 101 MG/DL
TSH SERPL-ACNC: 2.02 UIU/ML
WBC # FLD AUTO: 7.7 K/UL

## 2020-06-01 ENCOUNTER — RESULT REVIEW (OUTPATIENT)
Age: 65
End: 2020-06-01

## 2020-06-01 ENCOUNTER — OUTPATIENT (OUTPATIENT)
Dept: OUTPATIENT SERVICES | Facility: HOSPITAL | Age: 65
LOS: 1 days | End: 2020-06-01

## 2020-06-01 ENCOUNTER — APPOINTMENT (OUTPATIENT)
Dept: CT IMAGING | Facility: CLINIC | Age: 65
End: 2020-06-01
Payer: MEDICARE

## 2020-06-01 PROCEDURE — 71250 CT THORAX DX C-: CPT | Mod: 26

## 2020-06-12 ENCOUNTER — APPOINTMENT (OUTPATIENT)
Dept: ORTHOPEDIC SURGERY | Facility: CLINIC | Age: 65
End: 2020-06-12

## 2020-06-15 ENCOUNTER — APPOINTMENT (OUTPATIENT)
Dept: PULMONOLOGY | Facility: CLINIC | Age: 65
End: 2020-06-15

## 2020-07-20 ENCOUNTER — RX RENEWAL (OUTPATIENT)
Age: 65
End: 2020-07-20

## 2020-07-31 ENCOUNTER — APPOINTMENT (OUTPATIENT)
Dept: ORTHOPEDIC SURGERY | Facility: CLINIC | Age: 65
End: 2020-07-31
Payer: OTHER MISCELLANEOUS

## 2020-07-31 VITALS — TEMPERATURE: 98.7 F

## 2020-07-31 PROCEDURE — 99214 OFFICE O/P EST MOD 30 MIN: CPT

## 2020-07-31 NOTE — ASSESSMENT
[FreeTextEntry1] : Pt presents for follow up of his arthritic right knee. Since the last time i saw him he had a stent placed in March. He also has a pacemaker/ defibrillator which is pending to be inserted in the near future. He has gained approx 25 pounds since the last visit. But his unexplained weight loss of 50 pounds apparently has not yet been worked up by PCP. All these issues must be addressed before we perform TKA.

## 2020-07-31 NOTE — HISTORY OF PRESENT ILLNESS
[Pain Location] : pain [9] : a maximum pain level of 9/10 [Worsening] : worsening [Walking] : walking [Sitting] : sitting [Constant] : ~He/She~ states the symptoms seem to be constant [Standing] : standing [de-identified] : 11/4/2019 - 64 year old male s/p left total hip replacement presents to the office today to follow up on his bilateral arthritic knees. In regard to his left hip replacement, he reports increased erythema and tenderness just one week ago. Pt states that has now subsided. In regard to the knees, he reports a pain that is sharp in nature. He also reports swelling, buckling, locking, clicking, and loss of motion. He reports only being able to ambulate about one block before pain stops him. Pt is ambulating with a cane due to the pain in his knees. He also reports increased pain and difficulty with negotiating stairs. Pt pulls himself up using the railing. He also reports increased pain with standing from a seated position. He reports taking Advil with only some relief. He has received gel and cortisone injections in the past. He also did physical therapy in June 2019. 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 At this time, patient is unable to have elective total knee replacement surgery because he is awaiting a date for pacemaker insertion. \par \par 1/15/2020 - 64 year old male presents to the office today for a follow up on his bilateral arthritic knees. Pt was given a kenalog injection when he was last seen by us for pain relief. Pt reports one week of relief with this. Pt has been undergoing a cardiac work up and is to have a pacemaker inserted. He was scared to go forward with the pacemaker insertion but has now decided to do so. Pt continues to complain of pain and is taking Advil and Tylenol with only some relief. \par \par 7/31/2020 -

## 2020-07-31 NOTE — PHYSICAL EXAM
[Cane] : ambulates with cane [de-identified] : Radiographs done today AP lateral and skyline of both knees shows the bony structures are intact , moderate joint space narrowing and osteophyte formation in all compartments of the knee.  [de-identified] : Left Knee\par Inspection: no effusion or erythema.\par Wounds: none.\par Alignment: normal.\par Palpation: no specific tenderness on palpation.\par ROM: 0-95 degrees \par Ligamentous laxity: all ligaments appear stable\par Muscle Test: good quad strength.\par Leg examination: calf is soft and non-tender.\par \par Right knee\par Inspection: no effusion or erythema.\par Wounds: none.\par Alignment: normal.\par Palpation: no specific tenderness on palpation.\par ROM: 0-95 degrees \par Ligamentous laxity: all ligaments appear \par Muscle Test: good quad strength.\par Leg examination: calf is soft and non-tender.

## 2020-08-03 ENCOUNTER — APPOINTMENT (OUTPATIENT)
Dept: INTERNAL MEDICINE | Facility: CLINIC | Age: 65
End: 2020-08-03
Payer: MEDICARE

## 2020-08-03 VITALS
TEMPERATURE: 98.7 F | HEART RATE: 88 BPM | OXYGEN SATURATION: 98 % | HEIGHT: 68 IN | SYSTOLIC BLOOD PRESSURE: 134 MMHG | BODY MASS INDEX: 37.59 KG/M2 | DIASTOLIC BLOOD PRESSURE: 82 MMHG | WEIGHT: 248 LBS

## 2020-08-03 DIAGNOSIS — R63.4 ABNORMAL WEIGHT LOSS: ICD-10-CM

## 2020-08-03 PROCEDURE — 99214 OFFICE O/P EST MOD 30 MIN: CPT

## 2020-08-04 ENCOUNTER — APPOINTMENT (OUTPATIENT)
Dept: HEART AND VASCULAR | Facility: CLINIC | Age: 65
End: 2020-08-04
Payer: MEDICARE

## 2020-08-04 VITALS
BODY MASS INDEX: 37.59 KG/M2 | SYSTOLIC BLOOD PRESSURE: 146 MMHG | OXYGEN SATURATION: 98 % | DIASTOLIC BLOOD PRESSURE: 80 MMHG | HEART RATE: 83 BPM | WEIGHT: 248 LBS | TEMPERATURE: 97.6 F | HEIGHT: 68 IN

## 2020-08-04 PROCEDURE — 36415 COLL VENOUS BLD VENIPUNCTURE: CPT

## 2020-08-04 PROCEDURE — 93000 ELECTROCARDIOGRAM COMPLETE: CPT

## 2020-08-04 PROCEDURE — 99214 OFFICE O/P EST MOD 30 MIN: CPT

## 2020-08-04 NOTE — ASSESSMENT
[FreeTextEntry1] :  HE CANNOT HAVE KNEE SURGERY UNTIL HE HAS COMPLETED HIS CARDIAC EVALUATION\par needs AICD I would consider cardiac PET scan to rule out cardiac sarcoid. this should not preclude surgery. \par SANIA will do nuclear stress test and echo due to his symptoms \par he is very confused with his meds\par increase carvedilol to 12.5 mg po bid\par start torsedmide 10 mg daily did not take his atorvastatin ran out \par fu next week \par

## 2020-08-04 NOTE — HISTORY OF PRESENT ILLNESS
[FreeTextEntry1] : 65 M with DM HTN HPL CAD s/p PCI d RCA m LAD/D3 bifurcation in 2009 m LCX 2/2020 PAD s/p PTA of LSFA in 2015 Gout syncope ischemic CM EF 37% transmural scar as well as a non ischemic pattern h/o syncope in the past holter in May with NSVT he his sob walking a block has to stop wants to have knee replacement very confused about his meds \par \par ecg sr low voltage poor r wave progression

## 2020-08-04 NOTE — PHYSICAL EXAM
[Normal Appearance] : normal appearance [Well Groomed] : well groomed [General Appearance - Well Developed] : well developed [General Appearance - Well Nourished] : well nourished [General Appearance - In No Acute Distress] : no acute distress [Normal Conjunctiva] : the conjunctiva exhibited no abnormalities [Eyelids - No Xanthelasma] : the eyelids demonstrated no xanthelasmas [No Deformities] : no deformities [No Oral Pallor] : no oral pallor [Normal Oral Mucosa] : normal oral mucosa [No Oral Cyanosis] : no oral cyanosis [No Jugular Venous Poon A Waves] : no jugular venous poon A waves [Normal Jugular Venous A Waves Present] : normal jugular venous A waves present [Normal Jugular Venous V Waves Present] : normal jugular venous V waves present [Respiration, Rhythm And Depth] : normal respiratory rhythm and effort [Exaggerated Use Of Accessory Muscles For Inspiration] : no accessory muscle use [Heart Rate And Rhythm] : heart rate and rhythm were normal [Auscultation Breath Sounds / Voice Sounds] : lungs were clear to auscultation bilaterally [Heart Sounds] : normal S1 and S2 [Murmurs] : no murmurs present [Abdomen Tenderness] : non-tender [Abdomen Soft] : soft [Abnormal Walk] : normal gait [Gait - Sufficient For Exercise Testing] : the gait was sufficient for exercise testing [Abdomen Mass (___ Cm)] : no abdominal mass palpated [Cyanosis, Localized] : no localized cyanosis [Nail Clubbing] : no clubbing of the fingernails [Petechial Hemorrhages (___cm)] : no petechial hemorrhages [FreeTextEntry1] : bilateral edema  [] : no rash [Skin Color & Pigmentation] : normal skin color and pigmentation [No Venous Stasis] : no venous stasis [No Xanthoma] : no  xanthoma was observed [No Skin Ulcers] : no skin ulcer [Skin Lesions] : no skin lesions [Oriented To Time, Place, And Person] : oriented to person, place, and time [Affect] : the affect was normal [Mood] : the mood was normal [No Anxiety] : not feeling anxious

## 2020-08-05 LAB
ANION GAP SERPL CALC-SCNC: 15 MMOL/L
BUN SERPL-MCNC: 8 MG/DL
CALCIUM SERPL-MCNC: 10.2 MG/DL
CHLORIDE SERPL-SCNC: 103 MMOL/L
CHOLEST SERPL-MCNC: 236 MG/DL
CHOLEST/HDLC SERPL: 5.5 RATIO
CO2 SERPL-SCNC: 23 MMOL/L
CREAT SERPL-MCNC: 1.12 MG/DL
ESTIMATED AVERAGE GLUCOSE: 123 MG/DL
GLUCOSE SERPL-MCNC: 104 MG/DL
HBA1C MFR BLD HPLC: 5.9 %
HDLC SERPL-MCNC: 43 MG/DL
LDLC SERPL CALC-MCNC: 132 MG/DL
NT-PROBNP SERPL-MCNC: 389 PG/ML
POTASSIUM SERPL-SCNC: 3.9 MMOL/L
SODIUM SERPL-SCNC: 141 MMOL/L
TRIGL SERPL-MCNC: 303 MG/DL

## 2020-08-06 ENCOUNTER — APPOINTMENT (OUTPATIENT)
Dept: HEART AND VASCULAR | Facility: CLINIC | Age: 65
End: 2020-08-06
Payer: MEDICARE

## 2020-08-06 PROCEDURE — 93306 TTE W/DOPPLER COMPLETE: CPT

## 2020-08-06 PROCEDURE — ZZZZZ: CPT

## 2020-08-12 ENCOUNTER — APPOINTMENT (OUTPATIENT)
Dept: HEART AND VASCULAR | Facility: CLINIC | Age: 65
End: 2020-08-12
Payer: MEDICARE

## 2020-08-12 VITALS — WEIGHT: 247.99 LBS | HEIGHT: 68 IN | BODY MASS INDEX: 37.59 KG/M2

## 2020-08-12 PROCEDURE — 78452 HT MUSCLE IMAGE SPECT MULT: CPT

## 2020-08-12 PROCEDURE — 93015 CV STRESS TEST SUPVJ I&R: CPT

## 2020-08-12 PROCEDURE — ZZZZZ: CPT

## 2020-08-12 PROCEDURE — A9500: CPT

## 2020-08-16 LAB — HEMOCCULT STL QL IA: NEGATIVE

## 2020-09-01 ENCOUNTER — APPOINTMENT (OUTPATIENT)
Dept: INTERNAL MEDICINE | Facility: CLINIC | Age: 65
End: 2020-09-01
Payer: MEDICARE

## 2020-09-01 VITALS
WEIGHT: 247 LBS | HEIGHT: 68 IN | OXYGEN SATURATION: 98 % | BODY MASS INDEX: 37.44 KG/M2 | HEART RATE: 93 BPM | SYSTOLIC BLOOD PRESSURE: 137 MMHG | TEMPERATURE: 97.9 F | DIASTOLIC BLOOD PRESSURE: 79 MMHG

## 2020-09-01 PROCEDURE — 99214 OFFICE O/P EST MOD 30 MIN: CPT

## 2020-09-28 ENCOUNTER — RX RENEWAL (OUTPATIENT)
Age: 65
End: 2020-09-28

## 2020-10-11 ENCOUNTER — NON-APPOINTMENT (OUTPATIENT)
Age: 65
End: 2020-10-11

## 2020-10-12 ENCOUNTER — RX RENEWAL (OUTPATIENT)
Age: 65
End: 2020-10-12

## 2020-10-26 ENCOUNTER — RX CHANGE (OUTPATIENT)
Age: 65
End: 2020-10-26

## 2020-10-28 ENCOUNTER — RX CHANGE (OUTPATIENT)
Age: 65
End: 2020-10-28

## 2020-11-09 ENCOUNTER — APPOINTMENT (OUTPATIENT)
Dept: HEART AND VASCULAR | Facility: CLINIC | Age: 65
End: 2020-11-09
Payer: MEDICARE

## 2020-11-09 ENCOUNTER — NON-APPOINTMENT (OUTPATIENT)
Age: 65
End: 2020-11-09

## 2020-11-09 VITALS
DIASTOLIC BLOOD PRESSURE: 72 MMHG | WEIGHT: 247 LBS | HEIGHT: 68 IN | HEART RATE: 86 BPM | BODY MASS INDEX: 37.44 KG/M2 | SYSTOLIC BLOOD PRESSURE: 127 MMHG

## 2020-11-09 PROCEDURE — 99215 OFFICE O/P EST HI 40 MIN: CPT | Mod: 25

## 2020-11-09 PROCEDURE — 99072 ADDL SUPL MATRL&STAF TM PHE: CPT

## 2020-11-09 PROCEDURE — 93000 ELECTROCARDIOGRAM COMPLETE: CPT

## 2020-11-11 NOTE — DISCUSSION/SUMMARY
[FreeTextEntry1] : Mr. Sigala is a 65 year-old male with a history of mixed cardiomyopathy (now recovered) along with extensive scarring (including transmural scar of the inferior wall).  Prior episode of syncope is concerning in that he had no prodrome and I am concerned that it could have been VT-mediated.  He has now had recurrent syncope with brief prodrome.  We reviewed his diagnoses extensively. Given Mr. Sigala' degree of LGE, he may be at an increased risk for SCD.  We discussed the indication for an EP study for risk stratification.  If VT is inducible, we will proceed with ICD placement.  If his EPS is negative, we will proceed with ILR placement.  The procedure was offered to the patient within the next two weeks, but he has adamantly deferred the procedure until after the New Year due to his Workman's Comp proceedings. Risks of EPS, ICD and ILR placement were discussed.  Risks including but not limited to infection, vascular injury, cardiac perforation, pneumothorax were among those discussed.  He verbalized understanding.  All questions were answered to his apparent satisfaction.

## 2020-11-11 NOTE — HISTORY OF PRESENT ILLNESS
[FreeTextEntry1] : Mr. Sigala is a 64 year-old gentleman with type II DM, hypertensin, hyperlipidemia, CAD s/p PCI, syncope and mixed cardiomyopathy (EF 40% with transmural scar on CMR, EF 50-55% on most recent ECHO 8/2020) who presents for follow-up to discuss ICD placement.  \par \par He was referred following an episode of syncope that occurred while he was driving.  He notes no prodrome and can not remember any symptoms that led up to the event.  He did not sustain any significant trauma and denies any postictal period.  He had a recurrent syncopal event in early 2020.  He was standing in the kitchen making coffee when he started to feel dizzy; braced himself on the counter but had LOC.  Woke up on the kitchen floor; no confusion or incontinence.\par \par He presents today because he has decided he would like to proceed with ICD placement.  Denies any recurrent syncope since last visit but does describe some presyncopal symptoms.\par \par \par ECHO 8/2019 EF 50-55%, no significant valvular disease

## 2020-11-25 ENCOUNTER — RX RENEWAL (OUTPATIENT)
Age: 65
End: 2020-11-25

## 2020-12-04 NOTE — H&P ADULT - RESPIRATORY
detailed exam Referred To Otolaryngology For Closure Text (Leave Blank If You Do Not Want): After obtaining clear surgical margins the patient was sent to otolaryngology for surgical repair.  The patient understands they will receive post-surgical care and follow-up from the Otolaryngologyist's and referring physician's office, and may follow up in our surgical clinic as needed.

## 2020-12-18 ENCOUNTER — NON-APPOINTMENT (OUTPATIENT)
Age: 65
End: 2020-12-18

## 2020-12-18 ENCOUNTER — APPOINTMENT (OUTPATIENT)
Dept: HEART AND VASCULAR | Facility: CLINIC | Age: 65
End: 2020-12-18
Payer: MEDICARE

## 2020-12-18 VITALS
OXYGEN SATURATION: 98 % | HEIGHT: 68 IN | BODY MASS INDEX: 40.31 KG/M2 | DIASTOLIC BLOOD PRESSURE: 88 MMHG | WEIGHT: 265.99 LBS | HEART RATE: 84 BPM | SYSTOLIC BLOOD PRESSURE: 160 MMHG | TEMPERATURE: 98.9 F

## 2020-12-18 PROCEDURE — 99072 ADDL SUPL MATRL&STAF TM PHE: CPT

## 2020-12-18 PROCEDURE — 36415 COLL VENOUS BLD VENIPUNCTURE: CPT

## 2020-12-18 PROCEDURE — 99214 OFFICE O/P EST MOD 30 MIN: CPT

## 2020-12-18 PROCEDURE — 93000 ELECTROCARDIOGRAM COMPLETE: CPT

## 2020-12-20 LAB
ALBUMIN SERPL ELPH-MCNC: 4.7 G/DL
ALP BLD-CCNC: 75 U/L
ALT SERPL-CCNC: 29 U/L
ANION GAP SERPL CALC-SCNC: 13 MMOL/L
AST SERPL-CCNC: 22 U/L
BASOPHILS # BLD AUTO: 0.08 K/UL
BASOPHILS NFR BLD AUTO: 1.1 %
BILIRUB SERPL-MCNC: 0.8 MG/DL
BUN SERPL-MCNC: 13 MG/DL
CALCIUM SERPL-MCNC: 10.3 MG/DL
CHLORIDE SERPL-SCNC: 100 MMOL/L
CHOLEST SERPL-MCNC: 144 MG/DL
CO2 SERPL-SCNC: 26 MMOL/L
CREAT SERPL-MCNC: 1.12 MG/DL
EOSINOPHIL # BLD AUTO: 0.1 K/UL
EOSINOPHIL NFR BLD AUTO: 1.4 %
ESTIMATED AVERAGE GLUCOSE: 134 MG/DL
GLUCOSE SERPL-MCNC: 128 MG/DL
HBA1C MFR BLD HPLC: 6.3 %
HCT VFR BLD CALC: 43.5 %
HDLC SERPL-MCNC: 46 MG/DL
HGB BLD-MCNC: 14.6 G/DL
IMM GRANULOCYTES NFR BLD AUTO: 0.1 %
LDLC SERPL CALC-MCNC: 69 MG/DL
LYMPHOCYTES # BLD AUTO: 2.94 K/UL
LYMPHOCYTES NFR BLD AUTO: 42.1 %
MAN DIFF?: NORMAL
MCHC RBC-ENTMCNC: 30.6 PG
MCHC RBC-ENTMCNC: 33.6 GM/DL
MCV RBC AUTO: 91.2 FL
MONOCYTES # BLD AUTO: 0.69 K/UL
MONOCYTES NFR BLD AUTO: 9.9 %
NEUTROPHILS # BLD AUTO: 3.16 K/UL
NEUTROPHILS NFR BLD AUTO: 45.4 %
NONHDLC SERPL-MCNC: 98 MG/DL
NT-PROBNP SERPL-MCNC: 229 PG/ML
PLATELET # BLD AUTO: 378 K/UL
POTASSIUM SERPL-SCNC: 4.4 MMOL/L
PROT SERPL-MCNC: 7.5 G/DL
RBC # BLD: 4.77 M/UL
RBC # FLD: 15.5 %
SODIUM SERPL-SCNC: 140 MMOL/L
TRIGL SERPL-MCNC: 144 MG/DL
WBC # FLD AUTO: 6.98 K/UL

## 2020-12-20 NOTE — HISTORY OF PRESENT ILLNESS
[FreeTextEntry1] : 65 M with DM HTN HPL CAD s/p PCI d RCA m LAD/D3 bifurcation in 2009 m LCX 2/2020 PAD s/p PTA of LSFA in 2015 Gout syncope ischemic CM EF 37% transmural scar as well as a non ischemic pattern h/o syncope in the past holter in May with NSVT c/o leg swellling poor health literacy tends to get confused about his meds \par \par ecg sr low voltage poor r wave progression

## 2020-12-20 NOTE — PROCEDURE
[Size: ______] : The left ventricular size is [unfilled] [Wall Motion: ______] : The left ventricular wall motion is [unfilled] [Ejection Fraction: ______] : The left ventricular ejection fraction is [unfilled] [Normal] : 3. No pericardial effusion. [de-identified] : Dr. Kvng Farfan (card); Dr. Brianna Tsai (PCP) [de-identified] : Nicole Petri-Schoener, JANETCS [de-identified] : shortness of breath [de-identified] : 1.2 [de-identified] : 1.1 [de-identified] : 5.3 [de-identified] : 3.7 [de-identified] : 3.9 [de-identified] : 4.5 [de-identified] : 40-58 [de-identified] : inferoseptal and apical inferior hypokinesis as well as the apical segments [de-identified] : Mild concentric left ventricular hypertrophy. There is grade II diastolic dysfunction. [de-identified] : The left atrium is normal in size. The left atrial volume index is ml/m2. [de-identified] : The aortic valve is trileaflet.  The leaflets have normal excursion.  There is no significant aortic stenosis.  There is mild aortic regurgitation. [de-identified] : There is trace pulmonic insufficiency. [de-identified] : There is minimal tricuspid regurgitation. [de-identified] : The inferior vena cava measures 1.6 cm. [de-identified] : The aortic root is dilated at 3.9 cm.  The ascending aorta and aortic are normal in size.  The pulmonary artery appears normal in size. [de-identified] : Mildly reduced left ventricular LVEF 40-45% there is inferoseptal and apical inferior hypokinesis as well as the apical segments [de-identified] : Dilated aortic root 3.9 cm  [FreeTextEntry1] : : 1955\par Age: 65y\par Sex: M \par BP: 146/80\par Height: 173 cm\par Weight: 112 kg\par BSA: 2.2 m2\par

## 2020-12-20 NOTE — PHYSICAL EXAM
[General Appearance - Well Developed] : well developed [Normal Appearance] : normal appearance [Well Groomed] : well groomed [General Appearance - Well Nourished] : well nourished [No Deformities] : no deformities [General Appearance - In No Acute Distress] : no acute distress [Normal Conjunctiva] : the conjunctiva exhibited no abnormalities [Eyelids - No Xanthelasma] : the eyelids demonstrated no xanthelasmas [Normal Oral Mucosa] : normal oral mucosa [No Oral Pallor] : no oral pallor [No Oral Cyanosis] : no oral cyanosis [Normal Jugular Venous A Waves Present] : normal jugular venous A waves present [Normal Jugular Venous V Waves Present] : normal jugular venous V waves present [No Jugular Venous Poon A Waves] : no jugular venous poon A waves [Respiration, Rhythm And Depth] : normal respiratory rhythm and effort [Exaggerated Use Of Accessory Muscles For Inspiration] : no accessory muscle use [Auscultation Breath Sounds / Voice Sounds] : lungs were clear to auscultation bilaterally [Heart Rate And Rhythm] : heart rate and rhythm were normal [Heart Sounds] : normal S1 and S2 [Murmurs] : no murmurs present [Abdomen Soft] : soft [Abdomen Tenderness] : non-tender [Abdomen Mass (___ Cm)] : no abdominal mass palpated [Abnormal Walk] : normal gait [Gait - Sufficient For Exercise Testing] : the gait was sufficient for exercise testing [Nail Clubbing] : no clubbing of the fingernails [Cyanosis, Localized] : no localized cyanosis [Petechial Hemorrhages (___cm)] : no petechial hemorrhages [FreeTextEntry1] : bilateral edema  [Skin Color & Pigmentation] : normal skin color and pigmentation [] : no rash [No Venous Stasis] : no venous stasis [Skin Lesions] : no skin lesions [No Skin Ulcers] : no skin ulcer [No Xanthoma] : no  xanthoma was observed [Oriented To Time, Place, And Person] : oriented to person, place, and time [Affect] : the affect was normal [Mood] : the mood was normal [No Anxiety] : not feeling anxious

## 2020-12-20 NOTE — ASSESSMENT
[FreeTextEntry1] :  HE CANNOT HAVE KNEE SURGERY UNTIL HE HAS COMPLETED HIS CARDIAC EVALUATION\par needs AICD I would consider cardiac PET scan to rule out cardiac sarcoid. this should not preclude surgery. \par resume torsemide 20 mg po daily\par fu in two weeks \par

## 2021-01-06 ENCOUNTER — APPOINTMENT (OUTPATIENT)
Dept: HEART AND VASCULAR | Facility: CLINIC | Age: 66
End: 2021-01-06
Payer: MEDICARE

## 2021-01-06 VITALS
HEART RATE: 93 BPM | DIASTOLIC BLOOD PRESSURE: 88 MMHG | SYSTOLIC BLOOD PRESSURE: 136 MMHG | BODY MASS INDEX: 39.1 KG/M2 | HEIGHT: 68 IN | TEMPERATURE: 99.7 F | OXYGEN SATURATION: 97 % | WEIGHT: 257.98 LBS

## 2021-01-06 DIAGNOSIS — R60.9 EDEMA, UNSPECIFIED: ICD-10-CM

## 2021-01-06 PROCEDURE — 99214 OFFICE O/P EST MOD 30 MIN: CPT

## 2021-01-06 PROCEDURE — 99072 ADDL SUPL MATRL&STAF TM PHE: CPT

## 2021-01-06 NOTE — ASSESSMENT
[FreeTextEntry1] :  HE CANNOT HAVE KNEE SURGERY UNTIL HE HAS COMPLETED HIS CARDIAC EVALUATION\par needs AICD I would consider cardiac PET scan to rule out cardiac sarcoid. this should not preclude surgery. \par resume torsemide 20 mg po daily\par HTN controlled \par CAD on statin therapy \par fu in six weeks \par

## 2021-01-06 NOTE — PHYSICAL EXAM
[General Appearance - Well Developed] : well developed [Normal Appearance] : normal appearance [Well Groomed] : well groomed [General Appearance - Well Nourished] : well nourished [No Deformities] : no deformities [General Appearance - In No Acute Distress] : no acute distress [Normal Conjunctiva] : the conjunctiva exhibited no abnormalities [Eyelids - No Xanthelasma] : the eyelids demonstrated no xanthelasmas [Normal Oral Mucosa] : normal oral mucosa [No Oral Pallor] : no oral pallor [No Oral Cyanosis] : no oral cyanosis [Normal Jugular Venous A Waves Present] : normal jugular venous A waves present [Normal Jugular Venous V Waves Present] : normal jugular venous V waves present [No Jugular Venous Poon A Waves] : no jugular venous poon A waves [Respiration, Rhythm And Depth] : normal respiratory rhythm and effort [Exaggerated Use Of Accessory Muscles For Inspiration] : no accessory muscle use [Auscultation Breath Sounds / Voice Sounds] : lungs were clear to auscultation bilaterally [Heart Rate And Rhythm] : heart rate and rhythm were normal [Heart Sounds] : normal S1 and S2 [Murmurs] : no murmurs present [Abdomen Soft] : soft [Abdomen Tenderness] : non-tender [Abdomen Mass (___ Cm)] : no abdominal mass palpated [Abnormal Walk] : normal gait [Gait - Sufficient For Exercise Testing] : the gait was sufficient for exercise testing [Nail Clubbing] : no clubbing of the fingernails [Cyanosis, Localized] : no localized cyanosis [Petechial Hemorrhages (___cm)] : no petechial hemorrhages [Skin Color & Pigmentation] : normal skin color and pigmentation [] : no rash [No Venous Stasis] : no venous stasis [Skin Lesions] : no skin lesions [No Skin Ulcers] : no skin ulcer [No Xanthoma] : no  xanthoma was observed [Oriented To Time, Place, And Person] : oriented to person, place, and time [Affect] : the affect was normal [Mood] : the mood was normal [No Anxiety] : not feeling anxious [FreeTextEntry1] : elevated JVD

## 2021-01-06 NOTE — PROCEDURE
[Size: ______] : The left ventricular size is [unfilled] [Wall Motion: ______] : The left ventricular wall motion is [unfilled] [Ejection Fraction: ______] : The left ventricular ejection fraction is [unfilled] [Normal] : 3. No pericardial effusion. [de-identified] : Dr. Kvng Farfan (card); Dr. Brianna Tsai (PCP) [de-identified] : Nicole Petri-Schoener, JANETCS [de-identified] : shortness of breath [de-identified] : 1.2 [de-identified] : 1.1 [de-identified] : 5.3 [de-identified] : 3.7 [de-identified] : 3.9 [de-identified] : 4.5 [de-identified] : 40-93 [de-identified] : inferoseptal and apical inferior hypokinesis as well as the apical segments [de-identified] : Mild concentric left ventricular hypertrophy. There is grade II diastolic dysfunction. [de-identified] : The left atrium is normal in size. The left atrial volume index is ml/m2. [de-identified] : The aortic valve is trileaflet.  The leaflets have normal excursion.  There is no significant aortic stenosis.  There is mild aortic regurgitation. [de-identified] : There is trace pulmonic insufficiency. [de-identified] : There is minimal tricuspid regurgitation. [de-identified] : The inferior vena cava measures 1.6 cm. [de-identified] : The aortic root is dilated at 3.9 cm.  The ascending aorta and aortic are normal in size.  The pulmonary artery appears normal in size. [de-identified] : Mildly reduced left ventricular LVEF 40-45% there is inferoseptal and apical inferior hypokinesis as well as the apical segments [de-identified] : Dilated aortic root 3.9 cm  [FreeTextEntry1] : : 1955\par Age: 65y\par Sex: M \par BP: 146/80\par Height: 173 cm\par Weight: 112 kg\par BSA: 2.2 m2\par

## 2021-01-06 NOTE — HISTORY OF PRESENT ILLNESS
[FreeTextEntry1] : 65 M with DM HTN HPL CAD s/p PCI d RCA m LAD/D3 bifurcation in 2009 m LCX 2/2020 PAD s/p PTA of LSFA in 2015 Gout syncope ischemic CM EF 37% transmural scar as well as a non ischemic pattern h/o syncope in the past holter in May with NSVT c/o leg swelling poor health literacy tends to get confused about his meds resumed torsemide feels much better\par \par ecg sr low voltage poor r wave progression

## 2021-01-19 LAB — SARS-COV-2 N GENE NPH QL NAA+PROBE: NOT DETECTED

## 2021-02-03 ENCOUNTER — APPOINTMENT (OUTPATIENT)
Dept: HEART AND VASCULAR | Facility: CLINIC | Age: 66
End: 2021-02-03
Payer: MEDICARE

## 2021-02-03 VITALS
HEART RATE: 84 BPM | WEIGHT: 251.99 LBS | DIASTOLIC BLOOD PRESSURE: 74 MMHG | TEMPERATURE: 99.3 F | HEIGHT: 68 IN | SYSTOLIC BLOOD PRESSURE: 110 MMHG | BODY MASS INDEX: 38.19 KG/M2 | OXYGEN SATURATION: 95 %

## 2021-02-03 PROCEDURE — 36415 COLL VENOUS BLD VENIPUNCTURE: CPT

## 2021-02-03 PROCEDURE — 99214 OFFICE O/P EST MOD 30 MIN: CPT

## 2021-02-03 PROCEDURE — 99072 ADDL SUPL MATRL&STAF TM PHE: CPT

## 2021-02-03 NOTE — PROCEDURE
[Size: ______] : The left ventricular size is [unfilled] [Wall Motion: ______] : The left ventricular wall motion is [unfilled] [Ejection Fraction: ______] : The left ventricular ejection fraction is [unfilled] [Normal] : 3. No pericardial effusion. [de-identified] : Dr. Kvng Farfan (card); Dr. Brianna Tsai (PCP) [de-identified] : Nicole Petri-Schoener, JANETCS [de-identified] : shortness of breath [de-identified] : 1.2 [de-identified] : 1.1 [de-identified] : 5.3 [de-identified] : 3.7 [de-identified] : 3.9 [de-identified] : 4.5 [de-identified] : 40-57 [de-identified] : inferoseptal and apical inferior hypokinesis as well as the apical segments [de-identified] : Mild concentric left ventricular hypertrophy. There is grade II diastolic dysfunction. [de-identified] : The left atrium is normal in size. The left atrial volume index is ml/m2. [de-identified] : The aortic valve is trileaflet.  The leaflets have normal excursion.  There is no significant aortic stenosis.  There is mild aortic regurgitation. [de-identified] : There is trace pulmonic insufficiency. [de-identified] : There is minimal tricuspid regurgitation. [de-identified] : The inferior vena cava measures 1.6 cm. [de-identified] : The aortic root is dilated at 3.9 cm.  The ascending aorta and aortic are normal in size.  The pulmonary artery appears normal in size. [de-identified] : Mildly reduced left ventricular LVEF 40-45% there is inferoseptal and apical inferior hypokinesis as well as the apical segments [de-identified] : Dilated aortic root 3.9 cm  [FreeTextEntry1] : : 1955\par Age: 65y\par Sex: M \par BP: 146/80\par Height: 173 cm\par Weight: 112 kg\par BSA: 2.2 m2\par

## 2021-02-03 NOTE — ASSESSMENT
[FreeTextEntry1] :  I would consider cardiac PET scan to rule out cardiac sarcoid. this should not preclude surgery. i will find out where i can have it done \par Ischemic CM he is euvolemic \par HTN controlled \par CAD on statin therapy \par check bmp and bnp\par fu in two months \par

## 2021-02-03 NOTE — PHYSICAL EXAM
[General Appearance - Well Developed] : well developed [Normal Appearance] : normal appearance [Well Groomed] : well groomed [General Appearance - Well Nourished] : well nourished [No Deformities] : no deformities [General Appearance - In No Acute Distress] : no acute distress [Normal Conjunctiva] : the conjunctiva exhibited no abnormalities [Eyelids - No Xanthelasma] : the eyelids demonstrated no xanthelasmas [Normal Oral Mucosa] : normal oral mucosa [No Oral Pallor] : no oral pallor [No Oral Cyanosis] : no oral cyanosis [Normal Jugular Venous A Waves Present] : normal jugular venous A waves present [Normal Jugular Venous V Waves Present] : normal jugular venous V waves present [No Jugular Venous Poon A Waves] : no jugular venous poon A waves [Respiration, Rhythm And Depth] : normal respiratory rhythm and effort [Exaggerated Use Of Accessory Muscles For Inspiration] : no accessory muscle use [Auscultation Breath Sounds / Voice Sounds] : lungs were clear to auscultation bilaterally [Heart Rate And Rhythm] : heart rate and rhythm were normal [Heart Sounds] : normal S1 and S2 [Murmurs] : no murmurs present [Abdomen Soft] : soft [Abdomen Tenderness] : non-tender [Abdomen Mass (___ Cm)] : no abdominal mass palpated [Abnormal Walk] : normal gait [Gait - Sufficient For Exercise Testing] : the gait was sufficient for exercise testing [Nail Clubbing] : no clubbing of the fingernails [Cyanosis, Localized] : no localized cyanosis [Petechial Hemorrhages (___cm)] : no petechial hemorrhages [] : no rash [Skin Color & Pigmentation] : normal skin color and pigmentation [No Venous Stasis] : no venous stasis [Skin Lesions] : no skin lesions [No Skin Ulcers] : no skin ulcer [No Xanthoma] : no  xanthoma was observed [Affect] : the affect was normal [Oriented To Time, Place, And Person] : oriented to person, place, and time [Mood] : the mood was normal [No Anxiety] : not feeling anxious [FreeTextEntry1] : elevated JVD

## 2021-02-03 NOTE — HISTORY OF PRESENT ILLNESS
[FreeTextEntry1] : 65 M with DM HTN HPL CAD s/p PCI d RCA m LAD/D3 bifurcation in 2009 m LCX 2/2020 PAD s/p PTA of LSFA in 2015 Gout syncope ischemic CM EF 37% transmural scar as well as a non ischemic pattern on cardiac MRI h/o syncope in the past holter in May with NSVT  has not gotten his AICD was told he cannot drive or be operated on until he does he feels much better leg edema is gone \par \par ecg sr low voltage poor r wave progression

## 2021-02-05 DIAGNOSIS — D86.85 SARCOID MYOCARDITIS: ICD-10-CM

## 2021-02-05 LAB
ANION GAP SERPL CALC-SCNC: 22 MMOL/L
BUN SERPL-MCNC: 19 MG/DL
CALCIUM SERPL-MCNC: 10.5 MG/DL
CHLORIDE SERPL-SCNC: 102 MMOL/L
CO2 SERPL-SCNC: 20 MMOL/L
CREAT SERPL-MCNC: 1.31 MG/DL
GLUCOSE SERPL-MCNC: 93 MG/DL
MAGNESIUM SERPL-MCNC: 1.7 MG/DL
NT-PROBNP SERPL-MCNC: 201 PG/ML
POTASSIUM SERPL-SCNC: 3.9 MMOL/L
SODIUM SERPL-SCNC: 144 MMOL/L

## 2021-02-10 ENCOUNTER — APPOINTMENT (OUTPATIENT)
Dept: INTERNAL MEDICINE | Facility: CLINIC | Age: 66
End: 2021-02-10
Payer: MEDICARE

## 2021-02-10 VITALS
DIASTOLIC BLOOD PRESSURE: 84 MMHG | BODY MASS INDEX: 36.08 KG/M2 | HEART RATE: 64 BPM | SYSTOLIC BLOOD PRESSURE: 134 MMHG | HEIGHT: 70 IN | OXYGEN SATURATION: 97 % | TEMPERATURE: 97.3 F | WEIGHT: 252 LBS

## 2021-02-10 DIAGNOSIS — M25.562 PAIN IN LEFT KNEE: ICD-10-CM

## 2021-02-10 PROCEDURE — 99214 OFFICE O/P EST MOD 30 MIN: CPT

## 2021-02-10 PROCEDURE — 99072 ADDL SUPL MATRL&STAF TM PHE: CPT

## 2021-02-11 PROBLEM — M25.562 KNEE PAIN, LEFT: Status: ACTIVE | Noted: 2018-10-03

## 2021-02-16 ENCOUNTER — APPOINTMENT (OUTPATIENT)
Dept: ORTHOPEDIC SURGERY | Facility: CLINIC | Age: 66
End: 2021-02-16

## 2021-03-23 ENCOUNTER — RX RENEWAL (OUTPATIENT)
Age: 66
End: 2021-03-23

## 2021-04-05 ENCOUNTER — RX RENEWAL (OUTPATIENT)
Age: 66
End: 2021-04-05

## 2021-05-05 ENCOUNTER — NON-APPOINTMENT (OUTPATIENT)
Age: 66
End: 2021-05-05

## 2021-05-05 ENCOUNTER — APPOINTMENT (OUTPATIENT)
Dept: HEART AND VASCULAR | Facility: CLINIC | Age: 66
End: 2021-05-05
Payer: MEDICARE

## 2021-05-05 VITALS
WEIGHT: 264 LBS | SYSTOLIC BLOOD PRESSURE: 146 MMHG | BODY MASS INDEX: 37.8 KG/M2 | HEART RATE: 86 BPM | DIASTOLIC BLOOD PRESSURE: 86 MMHG | OXYGEN SATURATION: 98 % | TEMPERATURE: 98.8 F | HEIGHT: 70 IN

## 2021-05-05 LAB
BASOPHILS # BLD AUTO: 0.08 K/UL
BASOPHILS NFR BLD AUTO: 1.2 %
EOSINOPHIL # BLD AUTO: 0.12 K/UL
EOSINOPHIL NFR BLD AUTO: 1.8 %
HCT VFR BLD CALC: 39.8 %
HGB BLD-MCNC: 13.8 G/DL
IMM GRANULOCYTES NFR BLD AUTO: 0.1 %
LYMPHOCYTES # BLD AUTO: 2.84 K/UL
LYMPHOCYTES NFR BLD AUTO: 42.1 %
MAN DIFF?: NORMAL
MCHC RBC-ENTMCNC: 31.1 PG
MCHC RBC-ENTMCNC: 34.7 GM/DL
MCV RBC AUTO: 89.6 FL
MONOCYTES # BLD AUTO: 0.67 K/UL
MONOCYTES NFR BLD AUTO: 9.9 %
NEUTROPHILS # BLD AUTO: 3.03 K/UL
NEUTROPHILS NFR BLD AUTO: 44.9 %
PLATELET # BLD AUTO: 426 K/UL
RBC # BLD: 4.44 M/UL
RBC # FLD: 15 %
WBC # FLD AUTO: 6.75 K/UL

## 2021-05-05 PROCEDURE — 93000 ELECTROCARDIOGRAM COMPLETE: CPT

## 2021-05-05 PROCEDURE — 36415 COLL VENOUS BLD VENIPUNCTURE: CPT

## 2021-05-05 PROCEDURE — 99072 ADDL SUPL MATRL&STAF TM PHE: CPT

## 2021-05-05 PROCEDURE — 99214 OFFICE O/P EST MOD 30 MIN: CPT

## 2021-05-05 RX ORDER — ASPIRIN 81 MG
81 TABLET, DELAYED RELEASE (ENTERIC COATED) ORAL
Refills: 0 | Status: DISCONTINUED | COMMUNITY
End: 2021-05-05

## 2021-05-05 NOTE — PROCEDURE
[Size: ______] : The left ventricular size is [unfilled] [Wall Motion: ______] : The left ventricular wall motion is [unfilled] [Ejection Fraction: ______] : The left ventricular ejection fraction is [unfilled] [Normal] : 3. No pericardial effusion. [de-identified] : Dr. Kvng Farfan (card); Dr. Brianna Tsai (PCP) [de-identified] : Nicole Petri-Schoener, JANETCS [de-identified] : shortness of breath [de-identified] : 1.2 [de-identified] : 1.1 [de-identified] : 5.3 [de-identified] : 3.7 [de-identified] : 3.9 [de-identified] : 4.5 [de-identified] : inferoseptal and apical inferior hypokinesis as well as the apical segments [de-identified] : 40-54 [de-identified] : Mild concentric left ventricular hypertrophy. There is grade II diastolic dysfunction. [de-identified] : The left atrium is normal in size. The left atrial volume index is ml/m2. [de-identified] : The aortic valve is trileaflet.  The leaflets have normal excursion.  There is no significant aortic stenosis.  There is mild aortic regurgitation. [de-identified] : There is trace pulmonic insufficiency. [de-identified] : There is minimal tricuspid regurgitation. [de-identified] : The inferior vena cava measures 1.6 cm. [de-identified] : The aortic root is dilated at 3.9 cm.  The ascending aorta and aortic are normal in size.  The pulmonary artery appears normal in size. [de-identified] : Mildly reduced left ventricular LVEF 40-45% there is inferoseptal and apical inferior hypokinesis as well as the apical segments [de-identified] : Dilated aortic root 3.9 cm  [FreeTextEntry1] : : 1955\par Age: 65y\par Sex: M \par BP: 146/80\par Height: 173 cm\par Weight: 112 kg\par BSA: 2.2 m2\par

## 2021-05-05 NOTE — HISTORY OF PRESENT ILLNESS
[FreeTextEntry1] : 65 M with DM HTN HPL CAD s/p PCI d RCA m LAD/D3 bifurcation in 2009 m LCX 2/2020 PAD s/p PTA of LSFA in 2015 Gout syncope ischemic CM EF 37% transmural scar as well as a non ischemic pattern on cardiac MRI h/o syncope in the past holter in May with NSVT  has not gotten his AICD was told he cannot drive or be operated on he is now sob with bilateral worsening leg swelling started two weeks ago \par \par ecg sr low voltage poor r wave progression

## 2021-05-05 NOTE — PHYSICAL EXAM
[Well Developed] : well developed [Well Nourished] : well nourished [No Acute Distress] : no acute distress [Normal Venous Pressure] : normal venous pressure [No Carotid Bruit] : no carotid bruit [Normal S1, S2] : normal S1, S2 [No Murmur] : no murmur [No Rub] : no rub [No Gallop] : no gallop [Clear Lung Fields] : clear lung fields [Good Air Entry] : good air entry [No Respiratory Distress] : no respiratory distress  [Soft] : abdomen soft [Non Tender] : non-tender [No Masses/organomegaly] : no masses/organomegaly [Normal Bowel Sounds] : normal bowel sounds [Normal Gait] : normal gait [No Edema] : no edema [No Cyanosis] : no cyanosis [No Clubbing] : no clubbing [No Varicosities] : no varicosities [No Rash] : no rash [No Skin Lesions] : no skin lesions [Moves all extremities] : moves all extremities [No Focal Deficits] : no focal deficits [Normal Speech] : normal speech [Alert and Oriented] : alert and oriented [Normal memory] : normal memory [General Appearance - Well Developed] : well developed [Normal Appearance] : normal appearance [Well Groomed] : well groomed [General Appearance - Well Nourished] : well nourished [No Deformities] : no deformities [General Appearance - In No Acute Distress] : no acute distress [Normal Conjunctiva] : the conjunctiva exhibited no abnormalities [Eyelids - No Xanthelasma] : the eyelids demonstrated no xanthelasmas [Normal Oral Mucosa] : normal oral mucosa [No Oral Pallor] : no oral pallor [No Oral Cyanosis] : no oral cyanosis [Normal Jugular Venous A Waves Present] : normal jugular venous A waves present [Normal Jugular Venous V Waves Present] : normal jugular venous V waves present [No Jugular Venous Poon A Waves] : no jugular venous poon A waves [Respiration, Rhythm And Depth] : normal respiratory rhythm and effort [Exaggerated Use Of Accessory Muscles For Inspiration] : no accessory muscle use [Auscultation Breath Sounds / Voice Sounds] : lungs were clear to auscultation bilaterally [Heart Rate And Rhythm] : heart rate and rhythm were normal [Heart Sounds] : normal S1 and S2 [Murmurs] : no murmurs present [Abdomen Soft] : soft [Abdomen Tenderness] : non-tender [Abdomen Mass (___ Cm)] : no abdominal mass palpated [Abnormal Walk] : normal gait [Gait - Sufficient For Exercise Testing] : the gait was sufficient for exercise testing [Nail Clubbing] : no clubbing of the fingernails [Cyanosis, Localized] : no localized cyanosis [Petechial Hemorrhages (___cm)] : no petechial hemorrhages [Skin Color & Pigmentation] : normal skin color and pigmentation [] : no rash [No Venous Stasis] : no venous stasis [Skin Lesions] : no skin lesions [No Skin Ulcers] : no skin ulcer [No Xanthoma] : no  xanthoma was observed [Oriented To Time, Place, And Person] : oriented to person, place, and time [Affect] : the affect was normal [Mood] : the mood was normal [No Anxiety] : not feeling anxious [FreeTextEntry1] : bilateral leg swelling

## 2021-05-05 NOTE — ASSESSMENT
[FreeTextEntry1] :  I would consider cardiac PET scan to rule out cardiac sarcoid. this should not preclude surgery. i will find out where i can have it done \par Ischemic CM he is in acute on chronic systolic HF increase torsemide to 40 mg once a day and add farxiga\par HTN elevated due to volume overload\par CAD on statin therapy \par check bmp and bnp\par fu next week \par

## 2021-05-06 LAB
ALBUMIN SERPL ELPH-MCNC: 4.3 G/DL
ALP BLD-CCNC: 67 U/L
ALT SERPL-CCNC: 15 U/L
ANION GAP SERPL CALC-SCNC: 12 MMOL/L
AST SERPL-CCNC: 16 U/L
BILIRUB SERPL-MCNC: 0.4 MG/DL
BUN SERPL-MCNC: 12 MG/DL
CALCIUM SERPL-MCNC: 10 MG/DL
CHLORIDE SERPL-SCNC: 105 MMOL/L
CHOLEST SERPL-MCNC: 123 MG/DL
CO2 SERPL-SCNC: 27 MMOL/L
CREAT SERPL-MCNC: 0.96 MG/DL
ESTIMATED AVERAGE GLUCOSE: 137 MG/DL
GLUCOSE SERPL-MCNC: 114 MG/DL
HBA1C MFR BLD HPLC: 6.4 %
HDLC SERPL-MCNC: 40 MG/DL
LDLC SERPL CALC-MCNC: 66 MG/DL
NONHDLC SERPL-MCNC: 83 MG/DL
NT-PROBNP SERPL-MCNC: 217 PG/ML
POTASSIUM SERPL-SCNC: 3.7 MMOL/L
PROT SERPL-MCNC: 7.3 G/DL
SODIUM SERPL-SCNC: 144 MMOL/L
TRIGL SERPL-MCNC: 84 MG/DL

## 2021-05-07 RX ORDER — DAPAGLIFLOZIN 10 MG/1
10 TABLET, FILM COATED ORAL
Qty: 90 | Refills: 2 | Status: DISCONTINUED | COMMUNITY
Start: 2021-05-05 | End: 2021-05-07

## 2021-05-13 ENCOUNTER — APPOINTMENT (OUTPATIENT)
Dept: HEART AND VASCULAR | Facility: CLINIC | Age: 66
End: 2021-05-13
Payer: MEDICARE

## 2021-05-13 VITALS
DIASTOLIC BLOOD PRESSURE: 69 MMHG | TEMPERATURE: 98.3 F | WEIGHT: 259.99 LBS | OXYGEN SATURATION: 98 % | HEIGHT: 70 IN | BODY MASS INDEX: 37.22 KG/M2 | HEART RATE: 81 BPM | SYSTOLIC BLOOD PRESSURE: 117 MMHG

## 2021-05-13 PROCEDURE — 99072 ADDL SUPL MATRL&STAF TM PHE: CPT

## 2021-05-13 PROCEDURE — 99214 OFFICE O/P EST MOD 30 MIN: CPT

## 2021-05-13 NOTE — ASSESSMENT
[FreeTextEntry1] :  I would consider cardiac PET scan to rule out cardiac sarcoid. he never scheduled it \par Ischemic CM he is euvolemic i gave him a number to call for financial assistance\par HTN controlled\par CAD on statin therapy \par check bmp and bnp\par fu in one month

## 2021-05-13 NOTE — PROCEDURE
[Size: ______] : The left ventricular size is [unfilled] [Wall Motion: ______] : The left ventricular wall motion is [unfilled] [Ejection Fraction: ______] : The left ventricular ejection fraction is [unfilled] [Normal] : 3. No pericardial effusion. [de-identified] : Dr. Kvng Farfan (card); Dr. Brianna Tsai (PCP) [de-identified] : Nicole Petri-Schoener, JANETCS [de-identified] : shortness of breath [de-identified] : 1.2 [de-identified] : 1.1 [de-identified] : 5.3 [de-identified] : 3.7 [de-identified] : 3.9 [de-identified] : 4.5 [de-identified] : 40-33 [de-identified] : inferoseptal and apical inferior hypokinesis as well as the apical segments [de-identified] : Mild concentric left ventricular hypertrophy. There is grade II diastolic dysfunction. [de-identified] : The left atrium is normal in size. The left atrial volume index is ml/m2. [de-identified] : The aortic valve is trileaflet.  The leaflets have normal excursion.  There is no significant aortic stenosis.  There is mild aortic regurgitation. [de-identified] : There is trace pulmonic insufficiency. [de-identified] : There is minimal tricuspid regurgitation. [de-identified] : The inferior vena cava measures 1.6 cm. [de-identified] : The aortic root is dilated at 3.9 cm.  The ascending aorta and aortic are normal in size.  The pulmonary artery appears normal in size. [de-identified] : Mildly reduced left ventricular LVEF 40-45% there is inferoseptal and apical inferior hypokinesis as well as the apical segments [de-identified] : Dilated aortic root 3.9 cm  [FreeTextEntry1] : : 1955\par Age: 65y\par Sex: M \par BP: 146/80\par Height: 173 cm\par Weight: 112 kg\par BSA: 2.2 m2\par

## 2021-05-13 NOTE — HISTORY OF PRESENT ILLNESS
[FreeTextEntry1] : 65 M with DM HTN HPL CAD s/p PCI d RCA m LAD/D3 bifurcation in 2009 m LCX 2/2020 PAD s/p PTA of LSFA in 2015 Gout syncope ischemic CM EF 37% transmural scar as well as a non ischemic pattern on cardiac MRI h/o syncope in the past holter in May with NSVT  has not gotten his AICD was told he cannot drive or be operated on he is now sob with bilateral worsening leg edema torsemide was increased he has not filled his jardiance due to cost \par \par ecg sr low voltage poor r wave progression

## 2021-06-06 ENCOUNTER — RX RENEWAL (OUTPATIENT)
Age: 66
End: 2021-06-06

## 2021-06-10 ENCOUNTER — APPOINTMENT (OUTPATIENT)
Dept: UROLOGY | Facility: CLINIC | Age: 66
End: 2021-06-10

## 2021-06-22 ENCOUNTER — APPOINTMENT (OUTPATIENT)
Dept: HEART AND VASCULAR | Facility: CLINIC | Age: 66
End: 2021-06-22
Payer: MEDICARE

## 2021-06-22 VITALS
HEART RATE: 86 BPM | HEIGHT: 70 IN | TEMPERATURE: 97.8 F | OXYGEN SATURATION: 98 % | DIASTOLIC BLOOD PRESSURE: 72 MMHG | BODY MASS INDEX: 36.22 KG/M2 | SYSTOLIC BLOOD PRESSURE: 121 MMHG | WEIGHT: 253 LBS

## 2021-06-22 LAB
ESTIMATED AVERAGE GLUCOSE: 140 MG/DL
HBA1C MFR BLD HPLC: 6.5 %

## 2021-06-22 PROCEDURE — 36415 COLL VENOUS BLD VENIPUNCTURE: CPT

## 2021-06-22 PROCEDURE — 99214 OFFICE O/P EST MOD 30 MIN: CPT

## 2021-06-22 NOTE — REASON FOR VISIT
[Follow-Up - Clinic] : a clinic follow-up of [Cardiac Failure] : cardiac failure [FreeTextEntry2] : cardiomyopathy

## 2021-06-22 NOTE — HISTORY OF PRESENT ILLNESS
[FreeTextEntry1] : 66 M with DM HTN HPL CAD s/p PCI d RCA m LAD/D3 bifurcation in 2009 m LCX 2/2020 PAD s/p PTA of LSFA in 2015 Gout syncope ischemic CM EF 37% transmural scar as well as a non ischemic pattern on cardiac MRI h/o syncope in the past holter in May with NSVT  has not gotten his AICD was told he cannot drive or be operated on today he feels great has no complaints \par

## 2021-06-22 NOTE — PROCEDURE
[Size: ______] : The left ventricular size is [unfilled] [Wall Motion: ______] : The left ventricular wall motion is [unfilled] [Ejection Fraction: ______] : The left ventricular ejection fraction is [unfilled] [Normal] : 3. No pericardial effusion. [de-identified] : Dr. Kvng Farfan (card); Dr. Brianna Tsai (PCP) [de-identified] : Nicole Petri-Schoener, JANETCS [de-identified] : shortness of breath [de-identified] : 1.2 [de-identified] : 1.1 [de-identified] : 5.3 [de-identified] : 3.7 [de-identified] : 3.9 [de-identified] : 4.5 [de-identified] : 40-47 [de-identified] : inferoseptal and apical inferior hypokinesis as well as the apical segments [de-identified] : Mild concentric left ventricular hypertrophy. There is grade II diastolic dysfunction. [de-identified] : The left atrium is normal in size. The left atrial volume index is ml/m2. [de-identified] : The aortic valve is trileaflet.  The leaflets have normal excursion.  There is no significant aortic stenosis.  There is mild aortic regurgitation. [de-identified] : There is trace pulmonic insufficiency. [de-identified] : There is minimal tricuspid regurgitation. [de-identified] : The inferior vena cava measures 1.6 cm. [de-identified] : Mildly reduced left ventricular LVEF 40-45% there is inferoseptal and apical inferior hypokinesis as well as the apical segments [de-identified] : The aortic root is dilated at 3.9 cm.  The ascending aorta and aortic are normal in size.  The pulmonary artery appears normal in size. [de-identified] : Dilated aortic root 3.9 cm  [FreeTextEntry1] : : 1955\par Age: 65y\par Sex: M \par BP: 146/80\par Height: 173 cm\par Weight: 112 kg\par BSA: 2.2 m2\par

## 2021-06-22 NOTE — ASSESSMENT
[FreeTextEntry1] : never had cardiac PET scan to rule out cardiac sarcoid. this should not preclude surgery. i will find out where i can have it done \par LV dysfunction on GDMT he is euvolemic\par HTN at goal\par CAD on statin therapy \par check bmp and bnp\par fu in two months \par

## 2021-06-23 ENCOUNTER — APPOINTMENT (OUTPATIENT)
Dept: PULMONOLOGY | Facility: CLINIC | Age: 66
End: 2021-06-23
Payer: MEDICARE

## 2021-06-23 VITALS
HEIGHT: 70 IN | DIASTOLIC BLOOD PRESSURE: 76 MMHG | SYSTOLIC BLOOD PRESSURE: 114 MMHG | TEMPERATURE: 97.1 F | WEIGHT: 253 LBS | BODY MASS INDEX: 36.22 KG/M2 | OXYGEN SATURATION: 96 % | HEART RATE: 85 BPM

## 2021-06-23 LAB
ALBUMIN SERPL ELPH-MCNC: 4.3 G/DL
ALP BLD-CCNC: 70 U/L
ALT SERPL-CCNC: 19 U/L
ANION GAP SERPL CALC-SCNC: 15 MMOL/L
AST SERPL-CCNC: 25 U/L
BILIRUB SERPL-MCNC: 0.7 MG/DL
BUN SERPL-MCNC: 14 MG/DL
CALCIUM SERPL-MCNC: 9.7 MG/DL
CHLORIDE SERPL-SCNC: 100 MMOL/L
CHOLEST SERPL-MCNC: 121 MG/DL
CO2 SERPL-SCNC: 26 MMOL/L
CREAT SERPL-MCNC: 1.05 MG/DL
GLUCOSE SERPL-MCNC: 107 MG/DL
HDLC SERPL-MCNC: 39 MG/DL
LDLC SERPL CALC-MCNC: 65 MG/DL
NONHDLC SERPL-MCNC: 82 MG/DL
NT-PROBNP SERPL-MCNC: 151 PG/ML
POTASSIUM SERPL-SCNC: 3.5 MMOL/L
PROT SERPL-MCNC: 7 G/DL
SODIUM SERPL-SCNC: 141 MMOL/L
TRIGL SERPL-MCNC: 83 MG/DL

## 2021-06-23 PROCEDURE — 99205 OFFICE O/P NEW HI 60 MIN: CPT | Mod: 25

## 2021-06-23 PROCEDURE — 94010 BREATHING CAPACITY TEST: CPT

## 2021-06-23 NOTE — PHYSICAL EXAM
[No Acute Distress] : no acute distress [Normal Rate/Rhythm] : normal rate/rhythm [Normal S1, S2] : normal s1, s2 [No Murmurs] : no murmurs [No Resp Distress] : no resp distress [Clear to Auscultation Bilaterally] : clear to auscultation bilaterally [No Clubbing] : no clubbing [No Edema] : no edema [Normal Affect] : normal affect

## 2021-07-21 ENCOUNTER — RX RENEWAL (OUTPATIENT)
Age: 66
End: 2021-07-21

## 2021-07-27 ENCOUNTER — APPOINTMENT (OUTPATIENT)
Dept: UROLOGY | Facility: CLINIC | Age: 66
End: 2021-07-27
Payer: MEDICARE

## 2021-07-27 VITALS — SYSTOLIC BLOOD PRESSURE: 151 MMHG | HEART RATE: 72 BPM | DIASTOLIC BLOOD PRESSURE: 89 MMHG | TEMPERATURE: 98.1 F

## 2021-07-27 PROCEDURE — 99213 OFFICE O/P EST LOW 20 MIN: CPT

## 2021-07-27 NOTE — HISTORY OF PRESENT ILLNESS
[FreeTextEntry1] : hx BPH \par ?bilateral orchitis\par elevated PSA\par on plavix (CAD s/p PCI, DM HITN, EF 37%)\par +ED\par voiding comfortableyon flomax 0.8\par no dysuria\par no hematuria\par psa 1/2020 4.5

## 2021-07-27 NOTE — ASSESSMENT
[FreeTextEntry1] : BPH\par refill flomax 0.4\par \par elevated PSA\par multiple comorbidites\par repeat today to confrim trend \par if generally stable will monitor given cardiac comorbidities\par \par ED\par sildenafil 100

## 2021-08-03 ENCOUNTER — NON-APPOINTMENT (OUTPATIENT)
Age: 66
End: 2021-08-03

## 2021-08-09 ENCOUNTER — APPOINTMENT (OUTPATIENT)
Dept: INTERNAL MEDICINE | Facility: CLINIC | Age: 66
End: 2021-08-09
Payer: MEDICARE

## 2021-08-09 VITALS
HEIGHT: 71 IN | WEIGHT: 249 LBS | BODY MASS INDEX: 34.86 KG/M2 | TEMPERATURE: 97.3 F | OXYGEN SATURATION: 96 % | SYSTOLIC BLOOD PRESSURE: 163 MMHG | HEART RATE: 83 BPM | DIASTOLIC BLOOD PRESSURE: 94 MMHG

## 2021-08-09 DIAGNOSIS — D17.9 BENIGN LIPOMATOUS NEOPLASM, UNSPECIFIED: ICD-10-CM

## 2021-08-09 PROCEDURE — 99214 OFFICE O/P EST MOD 30 MIN: CPT

## 2021-08-31 ENCOUNTER — RX RENEWAL (OUTPATIENT)
Age: 66
End: 2021-08-31

## 2021-09-17 ENCOUNTER — APPOINTMENT (OUTPATIENT)
Dept: HEART AND VASCULAR | Facility: CLINIC | Age: 66
End: 2021-09-17
Payer: MEDICARE

## 2021-09-17 VITALS
WEIGHT: 245 LBS | SYSTOLIC BLOOD PRESSURE: 136 MMHG | HEART RATE: 77 BPM | HEIGHT: 71 IN | BODY MASS INDEX: 34.3 KG/M2 | OXYGEN SATURATION: 96 % | TEMPERATURE: 97.3 F | DIASTOLIC BLOOD PRESSURE: 76 MMHG

## 2021-09-17 PROCEDURE — 99214 OFFICE O/P EST MOD 30 MIN: CPT

## 2021-09-17 PROCEDURE — 36415 COLL VENOUS BLD VENIPUNCTURE: CPT

## 2021-09-17 NOTE — PHYSICAL EXAM
[Well Developed] : well developed [Well Nourished] : well nourished [No Acute Distress] : no acute distress [Normal Conjunctiva] : normal conjunctiva [Normal Venous Pressure] : normal venous pressure [No Carotid Bruit] : no carotid bruit [Normal S1, S2] : normal S1, S2 [No Murmur] : no murmur [No Rub] : no rub [No Gallop] : no gallop [Clear Lung Fields] : clear lung fields [Good Air Entry] : good air entry [No Respiratory Distress] : no respiratory distress  [Soft] : abdomen soft [Non Tender] : non-tender [Normal Gait] : normal gait [No Cyanosis] : no cyanosis [No Clubbing] : no clubbing [No Varicosities] : no varicosities [No Rash] : no rash [No Skin Lesions] : no skin lesions [Moves all extremities] : moves all extremities [No Focal Deficits] : no focal deficits [Normal Speech] : normal speech [Alert and Oriented] : alert and oriented [Normal memory] : normal memory [de-identified] : No JVD [de-identified] : mild edema on the ankle b/l

## 2021-09-17 NOTE — REVIEW OF SYSTEMS
[SOB] : shortness of breath [Orthopnea] : orthopnea [Negative] : Heme/Lymph [Dyspnea on exertion] : not dyspnea during exertion [Chest Discomfort] : no chest discomfort [Leg Claudication] : no intermittent leg claudication [Palpitations] : no palpitations [Syncope] : no syncope

## 2021-09-17 NOTE — ASSESSMENT
[FreeTextEntry1] : 67 yo male with PMHx of DM, HTN, HPL, CAD s/p PCI dRCA, mLAD/D3 bifurcation x 2009, mLCX x 2/2020, PAD s/p PTA of LSFA x 2015, gout, syncope (past holter in May with NSVT has not have ACID), ischemic CM EF 37% transmural scar as well as a non ischemic pattern on cardiac MRI c/o gradual worsen SOB over the year.\par \par 1. SOB: cardiac vs pulmonary. Echo 8/6/2020 showed EF of 40-45%, will repeat ECHO to eval the EF, and repeat blood work: BMP, BNP, Lipid panel, A1C. Pt have an appointment to see Dr. Owen on 10/5.\par 2. HTN: controlled on meds\par 3. Medications reviewed and renewed. \par 4. RTC once ECHO is completed

## 2021-09-17 NOTE — DISCUSSION/SUMMARY
[FreeTextEntry1] : I saw and evaluated the patient and discussed with Harley NICOLE. I agree with the findings and plan as documented in the note above. Has mild leg edema. will await echo results his sob is likely severe copd he is not volume overloaded. will consider entresto based on echo findings

## 2021-09-17 NOTE — HISTORY OF PRESENT ILLNESS
[FreeTextEntry1] : 65 yo male with PMHx of DM, HTN, HPL, CAD s/p PCI dRCA, mLAD/D3 bifurcation x 2009, mLCX x 2/2020, PAD s/p PTA of LSFA x 2015, gout, syncope (past holter in May with NSVT has not have ACID), ischemic CM EF 37% transmural scar as well as a non ischemic pattern on cardiac MRI. Pt presented to the office c/o gradual worsen SOB over the year. Stated he can only walk 1.5 blocks before stopping, sleep with 2 pillows, and on a recline position. Denies any chest pain. Admits to ankle edema b/l, and orthopnea. \par \par Echo 8/6/2020: EF 40-45% inferoseptal and apical inferior hypokinesis\par Lab: BNP 6/23/2021, 151

## 2021-09-21 ENCOUNTER — APPOINTMENT (OUTPATIENT)
Dept: HEART AND VASCULAR | Facility: CLINIC | Age: 66
End: 2021-09-21
Payer: MEDICARE

## 2021-09-21 PROCEDURE — 93306 TTE W/DOPPLER COMPLETE: CPT

## 2021-09-24 LAB
ANION GAP SERPL CALC-SCNC: 13 MMOL/L
BUN SERPL-MCNC: 14 MG/DL
CALCIUM SERPL-MCNC: 10 MG/DL
CHLORIDE SERPL-SCNC: 102 MMOL/L
CO2 SERPL-SCNC: 26 MMOL/L
CREAT SERPL-MCNC: 1.12 MG/DL
GLUCOSE SERPL-MCNC: 100 MG/DL
POTASSIUM SERPL-SCNC: 4.3 MMOL/L
SODIUM SERPL-SCNC: 142 MMOL/L

## 2021-09-28 ENCOUNTER — RESULT REVIEW (OUTPATIENT)
Age: 66
End: 2021-09-28

## 2021-09-28 ENCOUNTER — APPOINTMENT (OUTPATIENT)
Dept: CT IMAGING | Facility: CLINIC | Age: 66
End: 2021-09-28
Payer: MEDICARE

## 2021-09-28 ENCOUNTER — OUTPATIENT (OUTPATIENT)
Dept: OUTPATIENT SERVICES | Facility: HOSPITAL | Age: 66
LOS: 1 days | End: 2021-09-28

## 2021-09-28 PROCEDURE — 71250 CT THORAX DX C-: CPT | Mod: 26

## 2021-10-04 ENCOUNTER — RX RENEWAL (OUTPATIENT)
Age: 66
End: 2021-10-04

## 2021-10-04 NOTE — ASSESSMENT
[FreeTextEntry1] : Data reviewed:\par \par CT chest [facility] 06/2021 personally reviewed (Dr Awad): 1. There are a few small calcified mediastinal lymph nodes and there are a few calcified nodules in the right lower lobe. These findings are consistent with prior granulomatous disease, but do not have an appearance typical of sarcoidosis. 2. Severe emphysema. 3. There are tubular, branching, and tree-in-bud opacities in the right upper lobe. These findings are not typical of sarcoidosis. They likely represent small and large airways disease of infectious or inflammatory etiology. \par \par Andre 06/23/2021 : normal\par \par Impression:\par RUL nodule 18mm, former very heavy smoker quit age 48\par Emphysema\par \par Plan:\par Repeat CT scan in 3 mos for the nodule. It has a benign appearance but is large.\par For emphysema, dyspnea, consideration of pulm rehab if safe from cardiac POV.\par Return 3 mos w new scan, w PFT and 6 minute walk to assess oxygenation.\par --\par CT chest [facility] 9/2021 personally reviewed : Since 6/1/2020, there has been improvement in a dominant tubular opacity in the right apex and in additional tree-in-bud nodules in the right upper lobe. / review at appt tomorrow\par \par

## 2021-10-04 NOTE — HISTORY OF PRESENT ILLNESS
[TextBox_4] : 06/23/2021: Asked to evaluate patient by Dr Farfan for CT findings. He does endorse dyspnea. He was never diagnosed w a lung disease but has been told there was a spot. Grew up in [facility]. Quit smoking by age 48, 2 ppd from teen years. No lung cancer in family. Retired [employer] / [employer]  / Jason[employer]en[employer] Perez here. He is too dyspneic to do much walking, dyspneic on a block. Lives in [place]. Did not have Covid, is vaccinated.\par

## 2021-10-25 ENCOUNTER — APPOINTMENT (OUTPATIENT)
Dept: HEART AND VASCULAR | Facility: CLINIC | Age: 66
End: 2021-10-25
Payer: MEDICARE

## 2021-10-25 ENCOUNTER — NON-APPOINTMENT (OUTPATIENT)
Age: 66
End: 2021-10-25

## 2021-10-25 ENCOUNTER — LABORATORY RESULT (OUTPATIENT)
Age: 66
End: 2021-10-25

## 2021-10-25 VITALS
WEIGHT: 245 LBS | HEART RATE: 91 BPM | BODY MASS INDEX: 34.3 KG/M2 | HEIGHT: 71 IN | SYSTOLIC BLOOD PRESSURE: 157 MMHG | DIASTOLIC BLOOD PRESSURE: 97 MMHG

## 2021-10-25 PROCEDURE — 93000 ELECTROCARDIOGRAM COMPLETE: CPT

## 2021-10-25 PROCEDURE — 99214 OFFICE O/P EST MOD 30 MIN: CPT
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
## 2021-10-26 NOTE — DISCUSSION/SUMMARY
[FreeTextEntry1] : Mr. Sigala is a 66 year-old male with a history of mixed cardiomyopathy (now recovered) along with extensive scarring (including transmural scar of the inferior wall).  Prior episode of syncope is concerning in that he had no prodrome and I am concerned that it could have been VT-mediated.  He has now had recurrent syncope with brief prodrome.  We reviewed his diagnoses extensively. Given Mr. Sigala' degree of LGE, he may be at an increased risk for SCD.  We discussed the indication for an EP study for risk stratification.  If VT is inducible, we will proceed with ICD placement.  If his EPS is negative, we will proceed with ILR placement.  Risks of EPS, ICD and ILR placement were discussed.  Risks including but not limited to infection, vascular injury, cardiac perforation, pneumothorax were among those discussed.  He verbalized understanding.  All questions were answered to his apparent satisfaction.

## 2021-10-27 ENCOUNTER — APPOINTMENT (OUTPATIENT)
Dept: PULMONOLOGY | Facility: CLINIC | Age: 66
End: 2021-10-27

## 2021-10-29 ENCOUNTER — APPOINTMENT (OUTPATIENT)
Dept: PULMONOLOGY | Facility: CLINIC | Age: 66
End: 2021-10-29
Payer: MEDICARE

## 2021-10-29 VITALS
BODY MASS INDEX: 34.3 KG/M2 | SYSTOLIC BLOOD PRESSURE: 118 MMHG | HEIGHT: 71 IN | TEMPERATURE: 97.7 F | DIASTOLIC BLOOD PRESSURE: 84 MMHG | HEART RATE: 102 BPM | WEIGHT: 245 LBS | OXYGEN SATURATION: 94 %

## 2021-10-29 PROCEDURE — 99214 OFFICE O/P EST MOD 30 MIN: CPT

## 2021-10-29 NOTE — ASSESSMENT
[FreeTextEntry1] : Data reviewed:\par \par CT chest [facility] 9/2021 personally reviewed : Since 6/1/2020, there has been improvement in a dominant tubular opacity in the right apex and in additional tree-in-bud nodules in the right upper lobe\par \par Andre 06/23/2021 : normal\par \par Impression:\par RUL nodule 18mm, decreased in size, former very heavy smoker quit age 48\par Emphysema\par \par Plan:\par Scan is reassuring but he's still high risk - repeat in 6-9 mos.\par Not interested in O2 now, can defer PFT, walk to next visit (these were scheduled earlier this week but he came today).\par

## 2021-12-01 ENCOUNTER — RX RENEWAL (OUTPATIENT)
Age: 66
End: 2021-12-01

## 2021-12-08 ENCOUNTER — APPOINTMENT (OUTPATIENT)
Dept: INTERNAL MEDICINE | Facility: CLINIC | Age: 66
End: 2021-12-08
Payer: MEDICARE

## 2021-12-08 VITALS
WEIGHT: 251 LBS | DIASTOLIC BLOOD PRESSURE: 90 MMHG | BODY MASS INDEX: 35.14 KG/M2 | SYSTOLIC BLOOD PRESSURE: 144 MMHG | HEART RATE: 86 BPM | HEIGHT: 71 IN | OXYGEN SATURATION: 97 % | TEMPERATURE: 97.2 F

## 2021-12-08 PROCEDURE — 99214 OFFICE O/P EST MOD 30 MIN: CPT

## 2022-01-03 ENCOUNTER — RX RENEWAL (OUTPATIENT)
Age: 67
End: 2022-01-03

## 2022-01-24 ENCOUNTER — APPOINTMENT (OUTPATIENT)
Dept: ORTHOPEDIC SURGERY | Facility: CLINIC | Age: 67
End: 2022-01-24
Payer: OTHER MISCELLANEOUS

## 2022-01-24 VITALS — TEMPERATURE: 97.7 F

## 2022-01-24 DIAGNOSIS — M17.11 UNILATERAL PRIMARY OSTEOARTHRITIS, RIGHT KNEE: ICD-10-CM

## 2022-01-24 PROCEDURE — 99214 OFFICE O/P EST MOD 30 MIN: CPT

## 2022-01-24 PROCEDURE — 99072 ADDL SUPL MATRL&STAF TM PHE: CPT

## 2022-01-24 PROCEDURE — 73562 X-RAY EXAM OF KNEE 3: CPT | Mod: RT

## 2022-01-24 NOTE — PHYSICAL EXAM
[Cane] : ambulates with cane [de-identified] : Left Knee\par Inspection: no effusion or erythema.\par Wounds: none.\par Alignment: normal.\par Palpation: no specific tenderness on palpation.\par ROM: 0-95 degrees \par Ligamentous laxity: all ligaments appear stable\par Muscle Test: good quad strength.\par Leg examination: calf is soft and non-tender.\par \par Right knee\par Inspection: no effusion or erythema.\par Wounds: none.\par Alignment: normal.\par Palpation: no specific tenderness on palpation.\par ROM: 0-95 degrees \par Ligamentous laxity: all ligaments appear \par Muscle Test: good quad strength.\par Leg examination: calf is soft and non-tender. [de-identified] : 07/31/2020- Radiographs done today AP lateral and skyline of both knees shows the bony structures are intact , moderate joint space narrowing and osteophyte formation in all compartments of the knee.

## 2022-01-24 NOTE — HISTORY OF PRESENT ILLNESS
[Pain Location] : pain [Worsening] : worsening [9] : a maximum pain level of 9/10 [Walking] : walking [Sitting] : sitting [Standing] : standing [Constant] : ~He/She~ states the symptoms seem to be constant [de-identified] : 11/4/2019 - 64 year old male s/p left total hip replacement presents to the office today to follow up on his bilateral arthritic knees. In regard to his left hip replacement, he reports increased erythema and tenderness just one week ago. Pt states that has now subsided. In regard to the knees, he reports a pain that is sharp in nature. He also reports swelling, buckling, locking, clicking, and loss of motion. He reports only being able to ambulate about one block before pain stops him. Pt is ambulating with a cane due to the pain in his knees. He also reports increased pain and difficulty with negotiating stairs. Pt pulls himself up using the railing. He also reports increased pain with standing from a seated position. He reports taking Advil with only some relief. He has received gel and cortisone injections in the past. He also did physical therapy in June 2019. 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 At this time, patient is unable to have elective total knee replacement surgery because he is awaiting a date for pacemaker insertion. \par \par 1/15/2020 - 64 year old male presents to the office today for a follow up on his bilateral arthritic knees. Pt was given a kenalog injection when he was last seen by us for pain relief. Pt reports one week of relief with this. Pt has been undergoing a cardiac work up and is to have a pacemaker inserted. He was scared to go forward with the pacemaker insertion but has now decided to do so. Pt continues to complain of pain and is taking Advil and Tylenol with only some relief. \par \par 1/24/2022 - 66 year old male presents to the office today for a follow up on his arthritic knees. He still has not had an insertion of his pacemaker/defibrillator due to COVID. Patient has been using Voltaren gel and taking Tylenol with only some relief. Patient is here today to discuss his next options.

## 2022-01-24 NOTE — ASSESSMENT
[FreeTextEntry1] : 07/31/2020- Pt presents for follow up of his arthritic right knee. Since the last time i saw him he had a stent placed in March. He also has a pacemaker/ defibrillator which is pending to be inserted in the near future. He has gained approx 25 pounds since the last visit. But his unexplained weight loss of 50 pounds apparently has not yet been worked up by PCP. All these issues must be addressed before we perform TKA. \par \par 1/24/2022- Pt continues to complain of his arthritic knee pain. Functionally he is unchanged from his last appt. No new radiographs were done today. He continues to be unable to work. We will see him in 3-6 months.

## 2022-03-02 ENCOUNTER — RX RENEWAL (OUTPATIENT)
Age: 67
End: 2022-03-02

## 2022-03-13 ENCOUNTER — FORM ENCOUNTER (OUTPATIENT)
Age: 67
End: 2022-03-13

## 2022-04-03 ENCOUNTER — RX RENEWAL (OUTPATIENT)
Age: 67
End: 2022-04-03

## 2022-04-06 ENCOUNTER — APPOINTMENT (OUTPATIENT)
Dept: INTERNAL MEDICINE | Facility: CLINIC | Age: 67
End: 2022-04-06
Payer: MEDICARE

## 2022-04-06 VITALS
WEIGHT: 251 LBS | TEMPERATURE: 97.2 F | HEART RATE: 72 BPM | BODY MASS INDEX: 35.14 KG/M2 | SYSTOLIC BLOOD PRESSURE: 174 MMHG | HEIGHT: 71 IN | DIASTOLIC BLOOD PRESSURE: 107 MMHG | OXYGEN SATURATION: 98 %

## 2022-04-06 VITALS — SYSTOLIC BLOOD PRESSURE: 176 MMHG | DIASTOLIC BLOOD PRESSURE: 90 MMHG

## 2022-04-06 DIAGNOSIS — D75.839 THROMBOCYTOSIS, UNSPECIFIED: ICD-10-CM

## 2022-04-06 DIAGNOSIS — M54.2 CERVICALGIA: ICD-10-CM

## 2022-04-06 PROCEDURE — 99214 OFFICE O/P EST MOD 30 MIN: CPT | Mod: 25

## 2022-04-07 LAB
ALBUMIN SERPL ELPH-MCNC: 4.7 G/DL
ALP BLD-CCNC: 82 U/L
ALT SERPL-CCNC: 19 U/L
ANION GAP SERPL CALC-SCNC: 15 MMOL/L
AST SERPL-CCNC: 17 U/L
BASOPHILS # BLD AUTO: 0.06 K/UL
BASOPHILS NFR BLD AUTO: 1 %
BILIRUB SERPL-MCNC: 0.7 MG/DL
BUN SERPL-MCNC: 9 MG/DL
CALCIUM SERPL-MCNC: 9.8 MG/DL
CHLORIDE SERPL-SCNC: 102 MMOL/L
CHOLEST SERPL-MCNC: 148 MG/DL
CO2 SERPL-SCNC: 24 MMOL/L
CREAT SERPL-MCNC: 1 MG/DL
EGFR: 81 ML/MIN/1.73M2
EOSINOPHIL # BLD AUTO: 0.06 K/UL
EOSINOPHIL NFR BLD AUTO: 1 %
ESTIMATED AVERAGE GLUCOSE: 140 MG/DL
GLUCOSE SERPL-MCNC: 83 MG/DL
HBA1C MFR BLD HPLC: 6.5 %
HCT VFR BLD CALC: 47.4 %
HDLC SERPL-MCNC: 44 MG/DL
HGB BLD-MCNC: 15.7 G/DL
IMM GRANULOCYTES NFR BLD AUTO: 0.2 %
LDLC SERPL CALC-MCNC: 84 MG/DL
LYMPHOCYTES # BLD AUTO: 2.39 K/UL
LYMPHOCYTES NFR BLD AUTO: 39.4 %
MAN DIFF?: NORMAL
MCHC RBC-ENTMCNC: 31 PG
MCHC RBC-ENTMCNC: 33.1 GM/DL
MCV RBC AUTO: 93.5 FL
MONOCYTES # BLD AUTO: 0.62 K/UL
MONOCYTES NFR BLD AUTO: 10.2 %
NEUTROPHILS # BLD AUTO: 2.92 K/UL
NEUTROPHILS NFR BLD AUTO: 48.2 %
NONHDLC SERPL-MCNC: 104 MG/DL
PLATELET # BLD AUTO: 395 K/UL
POTASSIUM SERPL-SCNC: 4.1 MMOL/L
PROT SERPL-MCNC: 7.6 G/DL
RBC # BLD: 5.07 M/UL
RBC # FLD: 16.4 %
SODIUM SERPL-SCNC: 141 MMOL/L
TRIGL SERPL-MCNC: 98 MG/DL
WBC # FLD AUTO: 6.06 K/UL

## 2022-04-27 ENCOUNTER — APPOINTMENT (OUTPATIENT)
Dept: INTERNAL MEDICINE | Facility: CLINIC | Age: 67
End: 2022-04-27
Payer: MEDICARE

## 2022-04-27 VITALS
HEIGHT: 71 IN | OXYGEN SATURATION: 97 % | TEMPERATURE: 97.3 F | WEIGHT: 252 LBS | HEART RATE: 77 BPM | BODY MASS INDEX: 35.28 KG/M2 | SYSTOLIC BLOOD PRESSURE: 163 MMHG | DIASTOLIC BLOOD PRESSURE: 90 MMHG

## 2022-04-27 PROCEDURE — 99214 OFFICE O/P EST MOD 30 MIN: CPT

## 2022-05-02 ENCOUNTER — APPOINTMENT (OUTPATIENT)
Dept: HEART AND VASCULAR | Facility: CLINIC | Age: 67
End: 2022-05-02
Payer: MEDICARE

## 2022-05-02 VITALS
SYSTOLIC BLOOD PRESSURE: 128 MMHG | WEIGHT: 250 LBS | DIASTOLIC BLOOD PRESSURE: 77 MMHG | HEIGHT: 71 IN | HEART RATE: 80 BPM | TEMPERATURE: 97.7 F | BODY MASS INDEX: 35 KG/M2

## 2022-05-02 PROCEDURE — 93000 ELECTROCARDIOGRAM COMPLETE: CPT

## 2022-05-02 PROCEDURE — 99214 OFFICE O/P EST MOD 30 MIN: CPT | Mod: 25
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
## 2022-05-02 NOTE — DISCUSSION/SUMMARY
[FreeTextEntry1] : Mr. Sigala is a 67 year-old male with a history of mixed cardiomyopathy (now recovered) along with extensive scarring (including transmural scar of the inferior wall) and syncope who presents for follow up.\par \par 1- Syncope:\par -  We discussed possible etiologies. One of the prior episode of syncope is concerning in that he had no prodrome and I am concerned that it could have been VT-mediated. Given Mr. Sigala' degree of LGE on cardiac MRI, he may be at an increased risk for SCD.  We discussed the indication for an EP study for risk stratification. If VT is inducible, we will proceed with ICD placement.  If his EPS is negative, we will proceed with ILR placement. Risks of EPS, ICD and ILR placement were discussed. Risks including but not limited to infection, vascular injury, cardiac perforation, pneumothorax were among those discussed. He verbalized understanding. All questions were answered to his apparent satisfaction. He canceled before but today we discussed the procedure in details and he is comfortable to proceed and picked up a date in the next few weeks.  \par \par 2- Cardiomyopathy:\par - He certainly has a component of ischemic cardiomyopathy but probably also a non Ischemic component, his LVEF was reduced and recovered on more recent echo. He is maintained on GDMT for HF. He was revascularized and currently has no Ischemic symptoms. \par

## 2022-05-17 ENCOUNTER — NON-APPOINTMENT (OUTPATIENT)
Age: 67
End: 2022-05-17

## 2022-05-23 ENCOUNTER — NON-APPOINTMENT (OUTPATIENT)
Age: 67
End: 2022-05-23

## 2022-05-23 LAB — SARS-COV-2 N GENE NPH QL NAA+PROBE: NOT DETECTED

## 2022-05-25 ENCOUNTER — INPATIENT (INPATIENT)
Facility: HOSPITAL | Age: 67
LOS: 0 days | Discharge: HOME | DRG: 227 | End: 2022-05-26
Attending: INTERNAL MEDICINE | Admitting: INTERNAL MEDICINE
Payer: MEDICARE

## 2022-05-25 VITALS
HEIGHT: 71 IN | RESPIRATION RATE: 16 BRPM | HEART RATE: 81 BPM | WEIGHT: 250 LBS | OXYGEN SATURATION: 93 % | DIASTOLIC BLOOD PRESSURE: 67 MMHG | SYSTOLIC BLOOD PRESSURE: 108 MMHG

## 2022-05-25 DIAGNOSIS — Z90.81 ACQUIRED ABSENCE OF SPLEEN: ICD-10-CM

## 2022-05-25 DIAGNOSIS — Z96.642 PRESENCE OF LEFT ARTIFICIAL HIP JOINT: ICD-10-CM

## 2022-05-25 DIAGNOSIS — Z79.02 LONG TERM (CURRENT) USE OF ANTITHROMBOTICS/ANTIPLATELETS: ICD-10-CM

## 2022-05-25 DIAGNOSIS — N40.0 BENIGN PROSTATIC HYPERPLASIA WITHOUT LOWER URINARY TRACT SYMPTOMS: ICD-10-CM

## 2022-05-25 DIAGNOSIS — E11.9 TYPE 2 DIABETES MELLITUS WITHOUT COMPLICATIONS: ICD-10-CM

## 2022-05-25 DIAGNOSIS — Z95.5 PRESENCE OF CORONARY ANGIOPLASTY IMPLANT AND GRAFT: ICD-10-CM

## 2022-05-25 DIAGNOSIS — M19.90 UNSPECIFIED OSTEOARTHRITIS, UNSPECIFIED SITE: ICD-10-CM

## 2022-05-25 DIAGNOSIS — E66.9 OBESITY, UNSPECIFIED: ICD-10-CM

## 2022-05-25 DIAGNOSIS — M10.9 GOUT, UNSPECIFIED: ICD-10-CM

## 2022-05-25 DIAGNOSIS — J44.9 CHRONIC OBSTRUCTIVE PULMONARY DISEASE, UNSPECIFIED: ICD-10-CM

## 2022-05-25 DIAGNOSIS — I42.8 OTHER CARDIOMYOPATHIES: ICD-10-CM

## 2022-05-25 DIAGNOSIS — Z82.49 FAMILY HISTORY OF ISCHEMIC HEART DISEASE AND OTHER DISEASES OF THE CIRCULATORY SYSTEM: ICD-10-CM

## 2022-05-25 DIAGNOSIS — R91.1 SOLITARY PULMONARY NODULE: ICD-10-CM

## 2022-05-25 DIAGNOSIS — I25.10 ATHEROSCLEROTIC HEART DISEASE OF NATIVE CORONARY ARTERY WITHOUT ANGINA PECTORIS: ICD-10-CM

## 2022-05-25 DIAGNOSIS — I10 ESSENTIAL (PRIMARY) HYPERTENSION: ICD-10-CM

## 2022-05-25 DIAGNOSIS — I45.9 CONDUCTION DISORDER, UNSPECIFIED: ICD-10-CM

## 2022-05-25 DIAGNOSIS — R55 SYNCOPE AND COLLAPSE: ICD-10-CM

## 2022-05-25 DIAGNOSIS — Z79.84 LONG TERM (CURRENT) USE OF ORAL HYPOGLYCEMIC DRUGS: ICD-10-CM

## 2022-05-25 DIAGNOSIS — E78.5 HYPERLIPIDEMIA, UNSPECIFIED: ICD-10-CM

## 2022-05-25 LAB
GLUCOSE BLDC GLUCOMTR-MCNC: 107 MG/DL — HIGH (ref 70–99)
GLUCOSE BLDC GLUCOMTR-MCNC: 88 MG/DL — SIGNIFICANT CHANGE UP (ref 70–99)
ISTAT INR: 1.2 — HIGH (ref 0.88–1.16)
ISTAT PT: 13.7 SEC — HIGH (ref 10–12.9)
ISTAT VENOUS BE: 5 MMOL/L — HIGH (ref -2–3)
ISTAT VENOUS GLUCOSE: 109 MG/DL — HIGH (ref 70–99)
ISTAT VENOUS HCO3: 32 MMOL/L — HIGH (ref 23–28)
ISTAT VENOUS HEMATOCRIT: 45 % — SIGNIFICANT CHANGE UP (ref 39–50)
ISTAT VENOUS HEMOGLOBIN: 15.3 GM/DL — SIGNIFICANT CHANGE UP (ref 13–17)
ISTAT VENOUS IONIZED CALCIUM: 1.23 MMOL/L — SIGNIFICANT CHANGE UP (ref 1.12–1.3)
ISTAT VENOUS PCO2: 54 MMHG — HIGH (ref 41–51)
ISTAT VENOUS PH: 7.38 — SIGNIFICANT CHANGE UP (ref 7.31–7.41)
ISTAT VENOUS PO2: <66 MMHG — LOW (ref 35–40)
ISTAT VENOUS POTASSIUM: 3.6 MMOL/L — SIGNIFICANT CHANGE UP (ref 3.5–5.3)
ISTAT VENOUS SO2: 49 % — SIGNIFICANT CHANGE UP
ISTAT VENOUS SODIUM: 143 MMOL/L — SIGNIFICANT CHANGE UP (ref 135–145)
ISTAT VENOUS TCO2: 33 MMOL/L — HIGH (ref 22–31)
POCT ISTAT CREATININE: 1.2 MG/DL — SIGNIFICANT CHANGE UP (ref 0.5–1.3)

## 2022-05-25 PROCEDURE — 93620 COMP EP EVL R AT VEN PAC&REC: CPT | Mod: 26

## 2022-05-25 PROCEDURE — 93010 ELECTROCARDIOGRAM REPORT: CPT

## 2022-05-25 RX ORDER — DEXTROSE 50 % IN WATER 50 %
12.5 SYRINGE (ML) INTRAVENOUS ONCE
Refills: 0 | Status: DISCONTINUED | OUTPATIENT
Start: 2022-05-25 | End: 2022-05-26

## 2022-05-25 RX ORDER — ALLOPURINOL 300 MG
100 TABLET ORAL DAILY
Refills: 0 | Status: DISCONTINUED | OUTPATIENT
Start: 2022-05-26 | End: 2022-05-26

## 2022-05-25 RX ORDER — CEFAZOLIN SODIUM 1 G
2000 VIAL (EA) INJECTION EVERY 8 HOURS
Refills: 0 | Status: COMPLETED | OUTPATIENT
Start: 2022-05-25 | End: 2022-05-26

## 2022-05-25 RX ORDER — LOSARTAN POTASSIUM 100 MG/1
100 TABLET, FILM COATED ORAL DAILY
Refills: 0 | Status: DISCONTINUED | OUTPATIENT
Start: 2022-05-26 | End: 2022-05-26

## 2022-05-25 RX ORDER — CARVEDILOL PHOSPHATE 80 MG/1
12.5 CAPSULE, EXTENDED RELEASE ORAL
Refills: 0 | Status: DISCONTINUED | OUTPATIENT
Start: 2022-05-25 | End: 2022-05-26

## 2022-05-25 RX ORDER — GABAPENTIN 400 MG/1
1 CAPSULE ORAL
Qty: 0 | Refills: 0 | DISCHARGE

## 2022-05-25 RX ORDER — TAMSULOSIN HYDROCHLORIDE 0.4 MG/1
0.4 CAPSULE ORAL
Qty: 0 | Refills: 0 | DISCHARGE

## 2022-05-25 RX ORDER — ALLOPURINOL 300 MG
1 TABLET ORAL
Qty: 0 | Refills: 0 | DISCHARGE

## 2022-05-25 RX ORDER — GABAPENTIN 400 MG/1
300 CAPSULE ORAL
Refills: 0 | Status: DISCONTINUED | OUTPATIENT
Start: 2022-05-25 | End: 2022-05-26

## 2022-05-25 RX ORDER — ACETAMINOPHEN 500 MG
650 TABLET ORAL ONCE
Refills: 0 | Status: COMPLETED | OUTPATIENT
Start: 2022-05-25 | End: 2022-05-25

## 2022-05-25 RX ORDER — AMLODIPINE BESYLATE 2.5 MG/1
5 TABLET ORAL DAILY
Refills: 0 | Status: DISCONTINUED | OUTPATIENT
Start: 2022-05-26 | End: 2022-05-26

## 2022-05-25 RX ORDER — EZETIMIBE 10 MG/1
1 TABLET ORAL
Qty: 0 | Refills: 0 | DISCHARGE

## 2022-05-25 RX ORDER — AMLODIPINE BESYLATE 2.5 MG/1
1 TABLET ORAL
Qty: 0 | Refills: 0 | DISCHARGE

## 2022-05-25 RX ORDER — DEXTROSE 50 % IN WATER 50 %
15 SYRINGE (ML) INTRAVENOUS ONCE
Refills: 0 | Status: DISCONTINUED | OUTPATIENT
Start: 2022-05-25 | End: 2022-05-26

## 2022-05-25 RX ORDER — SODIUM CHLORIDE 9 MG/ML
1000 INJECTION, SOLUTION INTRAVENOUS
Refills: 0 | Status: DISCONTINUED | OUTPATIENT
Start: 2022-05-25 | End: 2022-05-26

## 2022-05-25 RX ORDER — GABAPENTIN 400 MG/1
2 CAPSULE ORAL
Qty: 0 | Refills: 0 | DISCHARGE

## 2022-05-25 RX ORDER — CARVEDILOL PHOSPHATE 80 MG/1
1 CAPSULE, EXTENDED RELEASE ORAL
Qty: 0 | Refills: 2 | DISCHARGE

## 2022-05-25 RX ORDER — INSULIN LISPRO 100/ML
VIAL (ML) SUBCUTANEOUS ONCE
Refills: 0 | Status: COMPLETED | OUTPATIENT
Start: 2022-05-25 | End: 2022-05-25

## 2022-05-25 RX ORDER — LOSARTAN POTASSIUM 100 MG/1
1 TABLET, FILM COATED ORAL
Qty: 0 | Refills: 0 | DISCHARGE

## 2022-05-25 RX ORDER — DEXTROSE 50 % IN WATER 50 %
25 SYRINGE (ML) INTRAVENOUS ONCE
Refills: 0 | Status: DISCONTINUED | OUTPATIENT
Start: 2022-05-25 | End: 2022-05-26

## 2022-05-25 RX ORDER — TAMSULOSIN HYDROCHLORIDE 0.4 MG/1
0.4 CAPSULE ORAL AT BEDTIME
Refills: 0 | Status: DISCONTINUED | OUTPATIENT
Start: 2022-05-25 | End: 2022-05-26

## 2022-05-25 RX ORDER — ASPIRIN/CALCIUM CARB/MAGNESIUM 324 MG
1 TABLET ORAL
Qty: 0 | Refills: 0 | DISCHARGE

## 2022-05-25 RX ORDER — CLOPIDOGREL BISULFATE 75 MG/1
75 TABLET, FILM COATED ORAL DAILY
Refills: 0 | Status: DISCONTINUED | OUTPATIENT
Start: 2022-05-26 | End: 2022-05-26

## 2022-05-25 RX ORDER — GLUCAGON INJECTION, SOLUTION 0.5 MG/.1ML
1 INJECTION, SOLUTION SUBCUTANEOUS ONCE
Refills: 0 | Status: DISCONTINUED | OUTPATIENT
Start: 2022-05-25 | End: 2022-05-26

## 2022-05-25 RX ADMIN — Medication 100 MILLIGRAM(S): at 19:03

## 2022-05-25 RX ADMIN — Medication 650 MILLIGRAM(S): at 17:58

## 2022-05-25 RX ADMIN — TAMSULOSIN HYDROCHLORIDE 0.4 MILLIGRAM(S): 0.4 CAPSULE ORAL at 22:28

## 2022-05-25 RX ADMIN — GABAPENTIN 300 MILLIGRAM(S): 400 CAPSULE ORAL at 16:58

## 2022-05-25 RX ADMIN — Medication 650 MILLIGRAM(S): at 16:58

## 2022-05-25 NOTE — PATIENT PROFILE ADULT - FALL HARM RISK - HARM RISK INTERVENTIONS
Assistance with ambulation/Assistance OOB with selected safe patient handling equipment/Communicate Risk of Fall with Harm to all staff/Discuss with provider need for PT consult/Monitor gait and stability/Provide patient with walking aids - walker, cane, crutches/Reinforce activity limits and safety measures with patient and family/Sit up slowly, dangle for a short time, stand at bedside before walking/Tailored Fall Risk Interventions/Use of alarms - bed, chair and/or voice tab/Visual Cue: Yellow wristband and red socks/Bed in lowest position, wheels locked, appropriate side rails in place/Call bell, personal items and telephone in reach/Instruct patient to call for assistance before getting out of bed or chair/Non-slip footwear when patient is out of bed/[place]by to call system/Physically safe environment - no spills, clutter or unnecessary equipment/Purposeful Proactive Rounding/Room/bathroom lighting operational, light cord in reach

## 2022-05-25 NOTE — H&P ADULT - HISTORY OF PRESENT ILLNESS
HPI:  66M with DM2, HTN, HLD, CAD s/p PCI, syncope, mixed cardiomyopathy with EF 40% with recent syncopal episode who presents for EP study +/- ICD implant vs. ILR.    PAST MEDICAL & SURGICAL HISTORY:  Hyperlipidemia  Essential hypertension  Atherosclerosis of coronary artery  s/p stent in 2009 and angioplasty in 2010  Osteoarthritis  Gout  Type 2 diabetes mellitus  Other acquired absence of organ S/P splenectomy  History of total hip replacement  left hip, April 2015          FH: MI (myocardial infarction)        Social History: no smoking, no drugs, no alcohol      Inpatient Medications:       Allergies: No Known Allergies      ROS:   CONSTITUTIONAL: No fever, weight loss + fatigue  EYES: Pt denies  RESPIRATORY: No cough, wheezing, chills or hemoptysis; No Shortness of Breath  CARDIOVASCULAR: see HPI  GASTROINTESTINAL: Pt denies  NEUROLOGICAL: Pt denies  SKIN: Pt denies   PSYCHIATRIC: Pt denies  HEME/LYMPH: Pt denies    PHYSICAL:    Appearance: No acute distress, well developed  Eyes: normal appearing conjunctiva, pupils and eyelids  Cardiovascular: Normal S1 S2, No JVD, No murmurs, No edema  Respiratory: Lungs clear to auscultation	bilaterally.  No wheeze, rhonchi, rales noted  Gastrointestinal:  Soft, NT/ND 	  Neurologic:  No deficit noted  Psych: A&Ox3, normal mood/affect  Musculoskeletal: normal gait  Skin: no rash noted, normal color and pigmentation.       Assessment Plan:  66M with DM2, HTN, HLD, CAD s/p PCI, syncope, mixed cardiomyopathy with EF 40% with recent syncopal episode who presents for EP study +/- ICD implant vs. ILR. plan for admission after procedure pending results.

## 2022-05-26 ENCOUNTER — TRANSCRIPTION ENCOUNTER (OUTPATIENT)
Age: 67
End: 2022-05-26

## 2022-05-26 VITALS — TEMPERATURE: 97 F

## 2022-05-26 PROCEDURE — C1777: CPT

## 2022-05-26 PROCEDURE — C1892: CPT

## 2022-05-26 PROCEDURE — C1721: CPT

## 2022-05-26 PROCEDURE — 84295 ASSAY OF SERUM SODIUM: CPT

## 2022-05-26 PROCEDURE — 82330 ASSAY OF CALCIUM: CPT

## 2022-05-26 PROCEDURE — 82565 ASSAY OF CREATININE: CPT

## 2022-05-26 PROCEDURE — C1730: CPT

## 2022-05-26 PROCEDURE — 71046 X-RAY EXAM CHEST 2 VIEWS: CPT | Mod: 26

## 2022-05-26 PROCEDURE — 84132 ASSAY OF SERUM POTASSIUM: CPT

## 2022-05-26 PROCEDURE — 93005 ELECTROCARDIOGRAM TRACING: CPT

## 2022-05-26 PROCEDURE — C1894: CPT

## 2022-05-26 PROCEDURE — C1898: CPT

## 2022-05-26 PROCEDURE — 82947 ASSAY GLUCOSE BLOOD QUANT: CPT

## 2022-05-26 PROCEDURE — 82803 BLOOD GASES ANY COMBINATION: CPT

## 2022-05-26 PROCEDURE — 82962 GLUCOSE BLOOD TEST: CPT

## 2022-05-26 PROCEDURE — 71046 X-RAY EXAM CHEST 2 VIEWS: CPT

## 2022-05-26 PROCEDURE — 85014 HEMATOCRIT: CPT

## 2022-05-26 PROCEDURE — C1760: CPT

## 2022-05-26 PROCEDURE — 85610 PROTHROMBIN TIME: CPT

## 2022-05-26 RX ORDER — OXYCODONE AND ACETAMINOPHEN 5; 325 MG/1; MG/1
1 TABLET ORAL ONCE
Refills: 0 | Status: DISCONTINUED | OUTPATIENT
Start: 2022-05-26 | End: 2022-05-26

## 2022-05-26 RX ORDER — OXYCODONE HYDROCHLORIDE 5 MG/1
2.5 TABLET ORAL ONCE
Refills: 0 | Status: DISCONTINUED | OUTPATIENT
Start: 2022-05-26 | End: 2022-05-26

## 2022-05-26 RX ORDER — ACETAMINOPHEN 500 MG
650 TABLET ORAL ONCE
Refills: 0 | Status: COMPLETED | OUTPATIENT
Start: 2022-05-26 | End: 2022-05-26

## 2022-05-26 RX ORDER — ACETAMINOPHEN 500 MG
2 TABLET ORAL
Qty: 0 | Refills: 0 | DISCHARGE
Start: 2022-05-26

## 2022-05-26 RX ORDER — CLOPIDOGREL BISULFATE 75 MG/1
1 TABLET, FILM COATED ORAL
Qty: 0 | Refills: 0 | DISCHARGE

## 2022-05-26 RX ORDER — ACETAMINOPHEN 500 MG
650 TABLET ORAL EVERY 6 HOURS
Refills: 0 | Status: DISCONTINUED | OUTPATIENT
Start: 2022-05-26 | End: 2022-05-26

## 2022-05-26 RX ORDER — ATORVASTATIN CALCIUM 80 MG/1
1 TABLET, FILM COATED ORAL
Qty: 0 | Refills: 0 | DISCHARGE

## 2022-05-26 RX ADMIN — CLOPIDOGREL BISULFATE 75 MILLIGRAM(S): 75 TABLET, FILM COATED ORAL at 11:11

## 2022-05-26 RX ADMIN — Medication 100 MILLIGRAM(S): at 11:11

## 2022-05-26 RX ADMIN — CARVEDILOL PHOSPHATE 12.5 MILLIGRAM(S): 80 CAPSULE, EXTENDED RELEASE ORAL at 06:11

## 2022-05-26 RX ADMIN — Medication 100 MILLIGRAM(S): at 03:00

## 2022-05-26 RX ADMIN — OXYCODONE HYDROCHLORIDE 2.5 MILLIGRAM(S): 5 TABLET ORAL at 06:01

## 2022-05-26 RX ADMIN — OXYCODONE HYDROCHLORIDE 2.5 MILLIGRAM(S): 5 TABLET ORAL at 06:30

## 2022-05-26 RX ADMIN — GABAPENTIN 300 MILLIGRAM(S): 400 CAPSULE ORAL at 06:10

## 2022-05-26 RX ADMIN — GABAPENTIN 300 MILLIGRAM(S): 400 CAPSULE ORAL at 11:10

## 2022-05-26 RX ADMIN — Medication 650 MILLIGRAM(S): at 00:30

## 2022-05-26 RX ADMIN — OXYCODONE AND ACETAMINOPHEN 1 TABLET(S): 5; 325 TABLET ORAL at 11:11

## 2022-05-26 RX ADMIN — GABAPENTIN 300 MILLIGRAM(S): 400 CAPSULE ORAL at 00:25

## 2022-05-26 RX ADMIN — LOSARTAN POTASSIUM 100 MILLIGRAM(S): 100 TABLET, FILM COATED ORAL at 06:11

## 2022-05-26 RX ADMIN — AMLODIPINE BESYLATE 5 MILLIGRAM(S): 2.5 TABLET ORAL at 06:11

## 2022-05-26 RX ADMIN — Medication 20 MILLIGRAM(S): at 06:10

## 2022-05-26 RX ADMIN — Medication 650 MILLIGRAM(S): at 01:00

## 2022-05-26 NOTE — DISCHARGE NOTE PROVIDER - NSDCFUADDINST_GEN_ALL_CORE_FT
You underwent an Electrophysiology Study on 5/25/22 that showed conduction disease and short run of ventricular ectopy. You then had an ICD implanted.   The company that makes your device is called [employer].   Please follow up with Dr. Huston on 6/27/22 at 2:10 PM.    Wound care instruction: Do not lift left arm above shoulder or lift heavy items with left arm for 1 month. You can shower 2 days later.  Keep incision site clean and dry. Do not remove the glue on the incision. Let it fall off on its own.   Call our doctor  if any questions about the wound -- such as bleeding, swelling, increasing pain or redness.  [phone].   You underwent an Electrophysiology Study on 5/25/22 that showed conduction disease and short run of ventricular ectopy. You then had an ICD implanted.   The company that makes your device is called [employer].   Please follow up with Dr. Huston on 6/27/22 at 2:10 PM.    Wound care instruction: Do not lift left arm above shoulder or lift heavy items with left arm for 1 month. You can shower 2 days later.  Keep incision site clean and dry. Do not remove the glue on the incision. Let it fall off on its own.   You can apply ice pack over the wound to help with soreness for the next few days.  for 5-10 mins each time, every few hours as needed   Call our doctor  if any questions about the wound -- such as bleeding, swelling, increasing pain or redness.  [phone].

## 2022-05-26 NOTE — DISCHARGE NOTE PROVIDER - NSDCFUSCHEDAPPT_GEN_ALL_CORE_FT
David Elias  [facility] Physician [facility]  Urology [address]  Scheduled Appointment: 07/26/2022    Brianna Tsai  [facility] Physician [facility]  Intmed [address]  Scheduled Appointment: 07/27/2022

## 2022-05-26 NOTE — DISCHARGE NOTE NURSING/CASE MANAGEMENT/SOCIAL WORK - NSDCPEFALRISK_GEN_ALL_CORE
For information on Fall & Injury Prevention, visit: [email or web address] OR  [email or web address] OR  https://www.cdc.gov/steadi/patient.html

## 2022-05-26 NOTE — DISCHARGE NOTE PROVIDER - NSDCMRMEDTOKEN_GEN_ALL_CORE_FT
acetaminophen 325 mg oral tablet: 2 tab(s) orally every 6 hours, As needed, Mild Pain (1 - 3)  allopurinol 100 mg oral tablet: 1 tab(s) orally once a day  amLODIPine 5 mg oral tablet: 1 tab(s) orally once a day  atorvastatin 80 mg oral tablet: 1 tab(s) orally once a day  carvedilol 12.5 mg oral tablet: 1 tab(s) orally 2 times a day  clopidogrel 75 mg oral tablet: 1 tab(s) orally once a day  gabapentin 300 mg oral capsule: 1 cap(s) orally 4 times a day  losartan 100 mg oral tablet: 1 tab(s) orally once a day  metFORMIN 500 mg oral tablet: 1 tab(s) orally 2 times a day.   tamsulosin: 0.4 tab(s) orally once a day (at bedtime)  torsemide 20 mg oral tablet: 1 tab(s) orally once a day  Zetia 10 mg oral tablet: 1 tab(s) orally once a day   acetaminophen 325 mg oral tablet: 2 tab(s) orally every 4 hours, As Needed for wound pain   allopurinol 100 mg oral tablet: 1 tab(s) orally once a day  amLODIPine 5 mg oral tablet: 1 tab(s) orally once a day  atorvastatin 80 mg oral tablet: 1 tab(s) orally once a day  carvedilol 12.5 mg oral tablet: 1 tab(s) orally 2 times a day  clopidogrel 75 mg oral tablet: 1 tab(s) orally once a day  gabapentin 300 mg oral capsule: 1 cap(s) orally 4 times a day  losartan 100 mg oral tablet: 1 tab(s) orally once a day  metFORMIN 500 mg oral tablet: 1 tab(s) orally 2 times a day.   tamsulosin: 0.4 tab(s) orally once a day (at bedtime)  torsemide 20 mg oral tablet: 1 tab(s) orally once a day  Zetia 10 mg oral tablet: 1 tab(s) orally once a day

## 2022-05-26 NOTE — CHART NOTE - NSCHARTNOTEFT_GEN_A_CORE
TARA WARE  [id number]    PROCEDURE:  EPS  Dual chamber ICD    INDICATION:  Nonischemic cardiomyopathy and syncope    ELECTROPHYSIOLOGIST(S):  MD Manjeet Wang MD (fellow)    ANESTHESIOLOGY:  MAC    FINDINGS:  EPS showing conduction system disease (HV 75ms) with inducible NSVT with ventricular stimulation  Implantation of dual chamber ICD    COMPLICATIONS:  None    RECOMMENDATIONS:  Continue with home meds  PA/Lat CXR  Will need outpatient follow up for workup of possible sarcoid vs other infiltrative diseases, possibly repeat cMRI or CT-PET

## 2022-05-26 NOTE — DISCHARGE NOTE PROVIDER - HOSPITAL COURSE
66M with DM2, HTN, HLD, CAD s/p PCI, syncope, mixed cardiomyopathy with EF 40% with recent syncopal episode who presents for EP study +/- ICD implant vs. ILR.  s/p EPS that showed long HV interval (80 ms) which signified infrahisian disease and inducible NSVT. As such, he underwent dual chamber ICD (medtronic) on 5/25/22.    CXR pa/lat today showed leads in satisfactory position. no ptx.   Device interrogation also with stable numbers:  RA sense:  4.6 mv      Threshold:  0.75  V at  0.4  ms.     Impedance: 513 ohms   RV sense:  11 mv      Threshold:  0.5  V at  0.4   ms.     Impedance: 532 ohms   Back up pacing AAI-DDD  bpm.   Device wound is good. no hematoma.   For d/c home today.

## 2022-05-26 NOTE — DISCHARGE NOTE NURSING/CASE MANAGEMENT/SOCIAL WORK - PATIENT PORTAL LINK FT
You can access the FollowMyHealth Patient Portal offered by [facility] by registering at the following website: [email or web address]. By joining "[email or web address]’s FollowMyHealth portal, you will also be able to view your health information using other applications (apps) compatible with our system.

## 2022-05-27 ENCOUNTER — NON-APPOINTMENT (OUTPATIENT)
Age: 67
End: 2022-05-27

## 2022-06-07 ENCOUNTER — APPOINTMENT (OUTPATIENT)
Dept: INTERNAL MEDICINE | Facility: CLINIC | Age: 67
End: 2022-06-07
Payer: MEDICARE

## 2022-06-07 VITALS
DIASTOLIC BLOOD PRESSURE: 88 MMHG | OXYGEN SATURATION: 98 % | WEIGHT: 252 LBS | BODY MASS INDEX: 35.28 KG/M2 | SYSTOLIC BLOOD PRESSURE: 144 MMHG | TEMPERATURE: 98 F | HEART RATE: 76 BPM | HEIGHT: 71 IN

## 2022-06-07 DIAGNOSIS — M25.50 PAIN IN UNSPECIFIED JOINT: ICD-10-CM

## 2022-06-07 PROCEDURE — 99495 TRANSJ CARE MGMT MOD F2F 14D: CPT

## 2022-06-27 ENCOUNTER — APPOINTMENT (OUTPATIENT)
Dept: HEART AND VASCULAR | Facility: CLINIC | Age: 67
End: 2022-06-27

## 2022-06-27 VITALS
DIASTOLIC BLOOD PRESSURE: 85 MMHG | HEIGHT: 71 IN | TEMPERATURE: 98 F | WEIGHT: 52 LBS | HEART RATE: 60 BPM | SYSTOLIC BLOOD PRESSURE: 150 MMHG | BODY MASS INDEX: 7.28 KG/M2

## 2022-06-27 PROCEDURE — 93283 PRGRMG EVAL IMPLANTABLE DFB: CPT

## 2022-06-27 NOTE — DISCUSSION/SUMMARY
[FreeTextEntry1] : Mr. Sigala is a 67 year-old male with a history of mixed cardiomyopathy (now recovered) along with extensive scarring (including transmural scar of the inferior wall) and syncope.  He is s/p EPS 5/25/22 significant for significant infrahisian disease and NSVT.  He is s/p dual chamber ICD placement 5/25/22 and presents for post procedure follow-up.  He denies recurrent presyncope/syncope.  He notes swelling of the left arm that started yesterday after working on his AC unit.  The device is functioning appropriately as programmed and all measured data is WNL.  Given his LUE edema, I am concerned that he may have subclavian thrombosis and advised him to start Xarelto 20 mg for one month.  He was asked to return for reassessment in 4 weeks.  All questions were answered to his apparent satisfaction.

## 2022-06-27 NOTE — PHYSICAL EXAM
[General Appearance - Well Developed] : well developed [Normal Appearance] : normal appearance [Well Groomed] : well groomed [General Appearance - Well Nourished] : well nourished [No Deformities] : no deformities [General Appearance - In No Acute Distress] : no acute distress [Heart Rate And Rhythm] : heart rate and rhythm were normal [Heart Sounds] : normal S1 and S2 [Murmurs] : no murmurs present [Respiration, Rhythm And Depth] : normal respiratory rhythm and effort [Exaggerated Use Of Accessory Muscles For Inspiration] : no accessory muscle use [Auscultation Breath Sounds / Voice Sounds] : lungs were clear to auscultation bilaterally [Clean] : clean [Dry] : dry [Healing Well] : healing well [Abdomen Soft] : soft [Abdomen Tenderness] : non-tender [Abdomen Mass (___ Cm)] : no abdominal mass palpated [Nail Clubbing] : no clubbing of the fingernails [Cyanosis, Localized] : no localized cyanosis [Petechial Hemorrhages (___cm)] : no petechial hemorrhages [] : no ischemic changes [FreeTextEntry1] : + edema LUE [Normal Conjunctiva] : the conjunctiva exhibited no abnormalities [Eyelids - No Xanthelasma] : the eyelids demonstrated no xanthelasmas [Normal Oral Mucosa] : normal oral mucosa [No Oral Pallor] : no oral pallor [No Oral Cyanosis] : no oral cyanosis [Normal Jugular Venous A Waves Present] : normal jugular venous A waves present [Normal Jugular Venous V Waves Present] : normal jugular venous V waves present [No Jugular Venous Poon A Waves] : no jugular venous poon A waves [Abnormal Walk] : normal gait [Gait - Sufficient For Exercise Testing] : the gait was sufficient for exercise testing

## 2022-06-27 NOTE — HISTORY OF PRESENT ILLNESS
[FreeTextEntry1] : Mr. Sigala is a 67 year-old gentleman with type II DM, hypertensin, hyperlipidemia, CAD s/p PCI, syncope and mixed cardiomyopathy (EF 40% with transmural scar on CMR, EF 50-55% on most recent ECHO 8/2020.  He was referred following an episode of syncope that occurred while he was driving.  He notes no prodrome and can not remember any symptoms that led up to the event.  He did not sustain any significant trauma and denies any postictal period.  He had a recurrent syncopal event in early 2020.  He was standing in the kitchen making coffee when he started to feel dizzy; braced himself on the counter but had LOC. Woke up on the kitchen floor; no confusion or incontinence.\par \par He is s/p EPS 5/25/22 significant for significant infrahisian disease and NSVT.  He is s/p dual chamber ICD placement 5/25/22 and presents for post procedure follow-up.  He denies recurrent presyncope/syncope.  He notes swelling of the left arm that started yesterday after working on his AC unit.  \par  \par Sees Dr. Owen for management of emphysema; RUL nodule that benign appearance but large.  \par \par ECHO 8/2019 EF 50-55%, no significant valvular disease

## 2022-06-27 NOTE — PROCEDURE
[No] : not [NSR] : normal sinus rhythm [ICD] : Implantable cardioverter-defibrillator [Normal] : The battery status is normal. [Lead Imp:  ___ohms] : lead impedance was [unfilled] ohms [Sensing Amplitude ___mv] : sensing amplitude was [unfilled] mv [___V @] : [unfilled] V [___ ms] : [unfilled] ms [de-identified] : [facility] [de-identified] : [employer] [de-identified] : 5/25/22 [de-identified] : AAI - DDD [de-identified] :  [de-identified] : A

## 2022-07-07 ENCOUNTER — NON-APPOINTMENT (OUTPATIENT)
Age: 67
End: 2022-07-07

## 2022-07-13 ENCOUNTER — APPOINTMENT (OUTPATIENT)
Dept: PULMONOLOGY | Facility: CLINIC | Age: 67
End: 2022-07-13

## 2022-07-13 VITALS
HEIGHT: 71 IN | WEIGHT: 250 LBS | SYSTOLIC BLOOD PRESSURE: 148 MMHG | OXYGEN SATURATION: 98 % | HEART RATE: 76 BPM | DIASTOLIC BLOOD PRESSURE: 91 MMHG | BODY MASS INDEX: 35 KG/M2 | TEMPERATURE: 97.5 F

## 2022-07-13 PROCEDURE — 90732 PPSV23 VACC 2 YRS+ SUBQ/IM: CPT

## 2022-07-13 PROCEDURE — G0009: CPT

## 2022-07-13 PROCEDURE — 99214 OFFICE O/P EST MOD 30 MIN: CPT | Mod: 25

## 2022-07-14 LAB — SARS-COV-2 N GENE NPH QL NAA+PROBE: NOT DETECTED

## 2022-07-18 ENCOUNTER — APPOINTMENT (OUTPATIENT)
Dept: INTERNAL MEDICINE | Facility: CLINIC | Age: 67
End: 2022-07-18

## 2022-07-18 ENCOUNTER — APPOINTMENT (OUTPATIENT)
Dept: PULMONOLOGY | Facility: CLINIC | Age: 67
End: 2022-07-18

## 2022-07-18 VITALS
BODY MASS INDEX: 35.28 KG/M2 | SYSTOLIC BLOOD PRESSURE: 127 MMHG | WEIGHT: 252 LBS | TEMPERATURE: 97.8 F | HEART RATE: 76 BPM | HEIGHT: 71 IN | OXYGEN SATURATION: 98 % | DIASTOLIC BLOOD PRESSURE: 76 MMHG

## 2022-07-18 DIAGNOSIS — R91.1 SOLITARY PULMONARY NODULE: ICD-10-CM

## 2022-07-18 PROCEDURE — 94727 GAS DIL/WSHOT DETER LNG VOL: CPT

## 2022-07-18 PROCEDURE — 99214 OFFICE O/P EST MOD 30 MIN: CPT

## 2022-07-18 PROCEDURE — 94729 DIFFUSING CAPACITY: CPT

## 2022-07-18 PROCEDURE — 94060 EVALUATION OF WHEEZING: CPT

## 2022-07-26 ENCOUNTER — APPOINTMENT (OUTPATIENT)
Dept: UROLOGY | Facility: CLINIC | Age: 67
End: 2022-07-26

## 2022-07-26 ENCOUNTER — NON-APPOINTMENT (OUTPATIENT)
Age: 67
End: 2022-07-26

## 2022-07-26 VITALS — TEMPERATURE: 98 F | HEART RATE: 80 BPM | SYSTOLIC BLOOD PRESSURE: 130 MMHG | DIASTOLIC BLOOD PRESSURE: 85 MMHG

## 2022-07-26 PROCEDURE — 99213 OFFICE O/P EST LOW 20 MIN: CPT

## 2022-07-26 NOTE — PHYSICAL EXAM
[General Appearance - Well Developed] : well developed [General Appearance - Well Nourished] : well nourished [Normal Appearance] : normal appearance [Well Groomed] : well groomed [General Appearance - In No Acute Distress] : no acute distress [Urethral Meatus] : meatus normal [Urinary Bladder Findings] : the bladder was normal on palpation [Scrotum] : the scrotum was normal [Testes Mass (___cm)] : there were no testicular masses [No Prostate Nodules] : no prostate nodules [Edema] : no peripheral edema [] : no respiratory distress [Respiration, Rhythm And Depth] : normal respiratory rhythm and effort [Exaggerated Use Of Accessory Muscles For Inspiration] : no accessory muscle use [Oriented To Time, Place, And Person] : oriented to person, place, and time [Affect] : the affect was normal [Mood] : the mood was normal [Not Anxious] : not anxious [Normal Station and Gait] : the gait and station were normal for the patient's age [No Focal Deficits] : no focal deficits

## 2022-07-26 NOTE — ASSESSMENT
[FreeTextEntry1] : BPH\par bothered by retrograde ejaculation on flomax \par trial alfuzosin \par \par elevated PSA\par No nodules, no induration on RONALDO today \par multiple comorbidites\par repeat today to confrim trend \par if generally stable will monitor given cardiac comorbidities\par \par ED\par cont. sildenafil 100\par \par He will call us and let us know how he is doing on alfuzosin.  Otherwise F/U 1 year.

## 2022-07-26 NOTE — HISTORY OF PRESENT ILLNESS
[FreeTextEntry1] : hx BPH \par ?bilateral orchitis\par elevated PSA\par on plavix (CAD s/p PCI, DM HITN, EF 37%)\par +ED\par voiding comfortableyon flomax 0.8\par no dysuria\par no hematuria\par psa 1/2020 4.5 \par \par 7/26/22 Here for f/u. No urinary complaints. Taking sildenafil with good erection response. Reaches climax but not able to ejaculate. This is very bothersome. S/p dual chamber ICD placement 5/25/22 for infrahisian disease and NSVT.

## 2022-08-02 ENCOUNTER — RESULT REVIEW (OUTPATIENT)
Age: 67
End: 2022-08-02

## 2022-08-02 ENCOUNTER — APPOINTMENT (OUTPATIENT)
Dept: CT IMAGING | Facility: CLINIC | Age: 67
End: 2022-08-02

## 2022-08-02 ENCOUNTER — OUTPATIENT (OUTPATIENT)
Dept: OUTPATIENT SERVICES | Facility: HOSPITAL | Age: 67
LOS: 1 days | End: 2022-08-02

## 2022-08-02 PROCEDURE — 71250 CT THORAX DX C-: CPT | Mod: 26

## 2022-08-03 ENCOUNTER — APPOINTMENT (OUTPATIENT)
Dept: HEART AND VASCULAR | Facility: CLINIC | Age: 67
End: 2022-08-03

## 2022-08-03 VITALS
SYSTOLIC BLOOD PRESSURE: 111 MMHG | OXYGEN SATURATION: 97 % | BODY MASS INDEX: 35 KG/M2 | TEMPERATURE: 97 F | HEART RATE: 88 BPM | HEIGHT: 71 IN | WEIGHT: 250 LBS | DIASTOLIC BLOOD PRESSURE: 68 MMHG

## 2022-08-03 PROCEDURE — 99214 OFFICE O/P EST MOD 30 MIN: CPT

## 2022-08-03 PROCEDURE — 36415 COLL VENOUS BLD VENIPUNCTURE: CPT

## 2022-08-03 PROCEDURE — 93000 ELECTROCARDIOGRAM COMPLETE: CPT

## 2022-08-03 RX ORDER — CLOPIDOGREL BISULFATE 75 MG/1
75 TABLET, FILM COATED ORAL DAILY
Qty: 1 | Refills: 3 | Status: DISCONTINUED | COMMUNITY
Start: 2020-05-11 | End: 2022-08-03

## 2022-08-03 NOTE — ASSESSMENT
[FreeTextEntry1] : DVT complete Xarelto another 60 days \par LV dysfunction on GDMT he is euvolemic \par chest pain stress test and echo \par HTN at goal may change to toprol given his dizziness await after testing \par CAD on statin therapy \par check bmp and bnp\par fu after testing \par

## 2022-08-03 NOTE — REASON FOR VISIT
[Cardiac Failure] : cardiac failure [Follow-Up - Clinic] : a clinic follow-up of [FreeTextEntry2] : cardiomyopathy

## 2022-08-03 NOTE — HISTORY OF PRESENT ILLNESS
[FreeTextEntry1] : 67 M with DM HTN HPL CAD s/p PCI d RCA m LAD/D3 bifurcation in 2009 m LCX 2/2020 PAD s/p PTA of LSFA in 2015 Gout syncope ischemic CM EF 37% transmural scar as well as a non ischemic pattern on cardiac MRI h/o syncope in the past holter in May with NSVT  s/p AICD complicated by UE DVT\par \par here today for chest pain its an ache when he walks he was in ED for left arm swelling after AICD found to have a DVT ran out of xarelto having some balance issues\par \par \par ecg nsr IVCD anterior and lateral t wave changes 8/3/2022        \par Echo 9/2021 EF 50-55% \par Nuclear stress test 8/2020 inferior infarct\par

## 2022-08-03 NOTE — PROCEDURE
[Size: ______] : The left ventricular size is [unfilled] [Wall Motion: ______] : The left ventricular wall motion is [unfilled] [Ejection Fraction: ______] : The left ventricular ejection fraction is [unfilled] [Normal] : 3. No pericardial effusion. [de-identified] : Dr. Kvng Farfan (card); Dr. Brianna Tsai (PCP) [de-identified] : Nicole Petri-Schoener, JANETCS [de-identified] : shortness of breath [de-identified] : 1.2 [de-identified] : 1.1 [de-identified] : 5.3 [de-identified] : 3.7 [de-identified] : 3.9 [de-identified] : 4.5 [de-identified] : 40-55 [de-identified] : inferoseptal and apical inferior hypokinesis as well as the apical segments [de-identified] : Mild concentric left ventricular hypertrophy. There is grade II diastolic dysfunction. [de-identified] : The left atrium is normal in size. The left atrial volume index is ml/m2. [de-identified] : The aortic valve is trileaflet.  The leaflets have normal excursion.  There is no significant aortic stenosis.  There is mild aortic regurgitation. [de-identified] : There is trace pulmonic insufficiency. [de-identified] : There is minimal tricuspid regurgitation. [de-identified] : The inferior vena cava measures 1.6 cm. [de-identified] : The aortic root is dilated at 3.9 cm.  The ascending aorta and aortic are normal in size.  The pulmonary artery appears normal in size. [de-identified] : Mildly reduced left ventricular LVEF 40-45% there is inferoseptal and apical inferior hypokinesis as well as the apical segments [de-identified] : Dilated aortic root 3.9 cm  [FreeTextEntry1] : : 1955\par Age: 65y\par Sex: M \par BP: 146/80\par Height: 173 cm\par Weight: 112 kg\par BSA: 2.2 m2\par

## 2022-08-04 LAB
CREAT SPEC-SCNC: 86 MG/DL
MICROALBUMIN 24H UR DL<=1MG/L-MCNC: <1.2 MG/DL
MICROALBUMIN/CREAT 24H UR-RTO: NORMAL MG/G

## 2022-08-05 ENCOUNTER — LABORATORY RESULT (OUTPATIENT)
Age: 67
End: 2022-08-05

## 2022-08-08 RX ORDER — EMPAGLIFLOZIN 25 MG/1
25 TABLET, FILM COATED ORAL DAILY
Qty: 90 | Refills: 2 | Status: DISCONTINUED | COMMUNITY
Start: 2021-05-06 | End: 2022-08-08

## 2022-08-08 RX ORDER — TAMSULOSIN HYDROCHLORIDE 0.4 MG/1
0.4 CAPSULE ORAL
Qty: 90 | Refills: 3 | Status: DISCONTINUED | COMMUNITY
Start: 2020-01-21 | End: 2022-08-08

## 2022-08-08 NOTE — HISTORY OF PRESENT ILLNESS
[TextBox_4] : 06/23/2021: Asked to evaluate patient by Dr Farfan for CT findings. He does endorse dyspnea. He was never diagnosed w a lung disease but has been told there was a spot. Grew up in [place] [facility]. Quit smoking by age 48, 2 ppd from teen years. No lung cancer in family. 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 He is too dyspneic to do much walking, dyspneic on a block. Lives in [place]. Did not have Covid, is vaccinated.\par \par 10/29/21: Comes in mostly to review his scan. Still dyspneic, and limited by joint pain, but basically doing ok. Is going to have a surgical procedure. Has previously had THR, splenectomy, spine surgery all without pulmonary complications. PAD but no VTE by his history. Quit smoking 1997.\par \par 7/13/22: Had dual chamber ICD 5/25/22 ([employer]). Hasn't had Covid. vax x 4. Slight dyspnea when he walks but not worsening. Walks in [facility]. Generally doing ok.\par  [ESS] : 10

## 2022-08-08 NOTE — ASSESSMENT
[FreeTextEntry1] : Data reviewed:\par \par CT chest [facility] 9/2021 personally reviewed : Since 6/1/2020, there has been improvement in a dominant tubular opacity in the right apex and in additional tree-in-bud nodules in the right upper lobe\par \par [place] 06/23/2021 : normal\par \par Impression:\par RUL nodule 18mm, decreased in size, former very heavy smoker quit age 48\par Emphysema\par Dyspnea\par \par Plan:\par Repeat CT chest at [facility] for nodules in high risk former smoker.\par Return for PFT for dyspnea.\par --\par PFT 7/18/22: borderline lisette, ratio 65%, FEV1 110%, TLC 96%, DLCO 72% and wnl\par --\par CT chest [facility] 8/2/22 personally reviewed :\par 1. Since July 28, 2021, unchanged right upper lobe tubular and tree-in-bud morphology nodules, likely benign mucoid impaction and small airways disease. No new or developing suspicious pulmonary nodule.\par 2. Moderate emphysema.\par Spoke to him, repeat one year.\par

## 2022-08-15 ENCOUNTER — APPOINTMENT (OUTPATIENT)
Dept: HEART AND VASCULAR | Facility: CLINIC | Age: 67
End: 2022-08-15

## 2022-08-15 VITALS
HEIGHT: 71 IN | HEART RATE: 78 BPM | OXYGEN SATURATION: 95 % | TEMPERATURE: 96.1 F | SYSTOLIC BLOOD PRESSURE: 138 MMHG | WEIGHT: 250 LBS | DIASTOLIC BLOOD PRESSURE: 84 MMHG | BODY MASS INDEX: 35 KG/M2

## 2022-08-15 PROCEDURE — 93283 PRGRMG EVAL IMPLANTABLE DFB: CPT

## 2022-08-15 NOTE — HISTORY OF PRESENT ILLNESS
[FreeTextEntry1] : Mr. Sigala is a 67 year-old gentleman with type II DM, hypertensin, hyperlipidemia, CAD s/p PCI, syncope and mixed cardiomyopathy (EF 40% with transmural scar on CMR, EF 50-55% on most recent ECHO 8/2020.  He was referred following an episode of syncope that occurred while he was driving.  He notes no prodrome and can not remember any symptoms that led up to the event.  He did not sustain any significant trauma and denies any postictal period.  He had a recurrent syncopal event in early 2020.  He was standing in the kitchen making coffee when he started to feel dizzy; braced himself on the counter but had LOC. Woke up on the kitchen floor; no confusion or incontinence.\par \par He is s/p EPS 5/25/22 significant for significant infrahisian disease and NSVT.  He is s/p dual chamber ICD placement 5/25/22.  At his post op visit he was noted to have LUE edema, concern for subclavian thrombosis.  He was started on Xarelto and notes swelling has resolved.  He offers no acute complaints today.  \par  \par Sees Dr. Owen for management of emphysema; RUL nodule that benign appearance but large.  \par \par ECHO 8/2019 EF 50-55%, no significant valvular disease

## 2022-08-15 NOTE — PROCEDURE
[No] : not [NSR] : normal sinus rhythm [ICD] : Implantable cardioverter-defibrillator [Normal] : The battery status is normal. [Lead Imp:  ___ohms] : lead impedance was [unfilled] ohms [Sensing Amplitude ___mv] : sensing amplitude was [unfilled] mv [___V @] : [unfilled] V [___ ms] : [unfilled] ms [de-identified] : [facility] [de-identified] : [employer] [de-identified] : 5/25/22 [de-identified] : AAI - DDD [de-identified] :  [de-identified] : RV defib impedance 65 ohms\par No events\par AP 5%\par  <0.1%

## 2022-08-15 NOTE — DISCUSSION/SUMMARY
[FreeTextEntry1] : Mr. Sigala is a 67 year-old male with a history of mixed cardiomyopathy (now recovered) along with extensive scarring (including transmural scar of the inferior wall) and syncope.  He is s/p EPS 5/25/22 significant for significant infrahisian disease and NSVT.  He is s/p dual chamber ICD placement 5/25/22 and presents for post procedure follow-up.  He denies recurrent presyncope/syncope.  He will complete 60 days of treatment with Xarelto for LUE DVT.  He is enrolled in remote monitoring and was asked to return for follow-up in six months.  All questions were answered to his apparent satisfaction.

## 2022-08-22 ENCOUNTER — RX RENEWAL (OUTPATIENT)
Age: 67
End: 2022-08-22

## 2022-08-24 ENCOUNTER — APPOINTMENT (OUTPATIENT)
Dept: HEART AND VASCULAR | Facility: CLINIC | Age: 67
End: 2022-08-24

## 2022-08-24 ENCOUNTER — NON-APPOINTMENT (OUTPATIENT)
Age: 67
End: 2022-08-24

## 2022-08-24 PROCEDURE — 93295 DEV INTERROG REMOTE 1/2/MLT: CPT

## 2022-08-24 PROCEDURE — 93296 REM INTERROG EVL PM/IDS: CPT

## 2022-09-06 ENCOUNTER — APPOINTMENT (OUTPATIENT)
Dept: INTERNAL MEDICINE | Facility: CLINIC | Age: 67
End: 2022-09-06

## 2022-09-06 VITALS
OXYGEN SATURATION: 97 % | TEMPERATURE: 97 F | HEIGHT: 71 IN | WEIGHT: 249 LBS | SYSTOLIC BLOOD PRESSURE: 127 MMHG | HEART RATE: 75 BPM | BODY MASS INDEX: 34.86 KG/M2 | DIASTOLIC BLOOD PRESSURE: 78 MMHG

## 2022-09-06 DIAGNOSIS — R55 SYNCOPE AND COLLAPSE: ICD-10-CM

## 2022-09-06 DIAGNOSIS — Z00.00 ENCOUNTER FOR GENERAL ADULT MEDICAL EXAMINATION W/OUT ABNORMAL FINDINGS: ICD-10-CM

## 2022-09-06 PROCEDURE — 99213 OFFICE O/P EST LOW 20 MIN: CPT | Mod: 25

## 2022-09-06 PROCEDURE — 99397 PER PM REEVAL EST PAT 65+ YR: CPT | Mod: 25

## 2022-09-06 PROCEDURE — 93000 ELECTROCARDIOGRAM COMPLETE: CPT

## 2022-09-08 ENCOUNTER — RX RENEWAL (OUTPATIENT)
Age: 67
End: 2022-09-08

## 2022-09-18 ENCOUNTER — NON-APPOINTMENT (OUTPATIENT)
Age: 67
End: 2022-09-18

## 2022-09-21 ENCOUNTER — APPOINTMENT (OUTPATIENT)
Dept: HEART AND VASCULAR | Facility: CLINIC | Age: 67
End: 2022-09-21

## 2022-09-21 PROCEDURE — 93306 TTE W/DOPPLER COMPLETE: CPT

## 2022-09-21 PROCEDURE — A9500: CPT

## 2022-09-21 PROCEDURE — ZZZZZ: CPT

## 2022-09-21 PROCEDURE — 78452 HT MUSCLE IMAGE SPECT MULT: CPT

## 2022-09-25 LAB — HEMOCCULT STL QL IA: NEGATIVE

## 2022-09-28 ENCOUNTER — APPOINTMENT (OUTPATIENT)
Dept: HEART AND VASCULAR | Facility: CLINIC | Age: 67
End: 2022-09-28

## 2022-09-28 ENCOUNTER — NON-APPOINTMENT (OUTPATIENT)
Age: 67
End: 2022-09-28

## 2022-09-28 VITALS
DIASTOLIC BLOOD PRESSURE: 70 MMHG | SYSTOLIC BLOOD PRESSURE: 118 MMHG | HEIGHT: 71 IN | TEMPERATURE: 98.4 F | BODY MASS INDEX: 34.58 KG/M2 | HEART RATE: 89 BPM | WEIGHT: 246.98 LBS | OXYGEN SATURATION: 96 %

## 2022-09-28 PROCEDURE — 93000 ELECTROCARDIOGRAM COMPLETE: CPT

## 2022-09-28 PROCEDURE — 36415 COLL VENOUS BLD VENIPUNCTURE: CPT

## 2022-09-28 PROCEDURE — 99214 OFFICE O/P EST MOD 30 MIN: CPT

## 2022-09-28 PROCEDURE — 90662 IIV NO PRSV INCREASED AG IM: CPT

## 2022-09-28 PROCEDURE — G0008: CPT

## 2022-09-28 RX ORDER — ASPIRIN 81 MG/1
81 TABLET, CHEWABLE ORAL DAILY
Qty: 90 | Refills: 3 | Status: ACTIVE | COMMUNITY
Start: 2022-09-28 | End: 1900-01-01

## 2022-09-28 RX ORDER — RIVAROXABAN 20 MG/1
20 TABLET, FILM COATED ORAL
Qty: 60 | Refills: 0 | Status: DISCONTINUED | COMMUNITY
Start: 2022-06-27 | End: 2022-09-28

## 2022-09-28 RX ORDER — CARVEDILOL 12.5 MG/1
12.5 TABLET, FILM COATED ORAL TWICE DAILY
Qty: 180 | Refills: 3 | Status: DISCONTINUED | COMMUNITY
Start: 2018-10-24 | End: 2022-09-28

## 2022-09-28 NOTE — ASSESSMENT
[FreeTextEntry1] : DVT complete Xarelto another 60 days stop 9/29/2022 and start aspirin 81 mg daily \par LV dysfunction on GDMT he is euvolemic \par chest pain ? GI related trial of pepcid stress test with infarct pattern \par HTN at goal change to toprol 100 daiily given his dizziness await after testing \par CAD on statin therapy \par check bmp and bnp\par fu after three months \par

## 2022-09-28 NOTE — HISTORY OF PRESENT ILLNESS
[FreeTextEntry1] : 67 M with DM HTN HPL CAD s/p PCI d RCA m LAD/D3 bifurcation in 2009 m LCX 2/2020 PAD s/p PTA of LSFA in 2015 Gout syncope ischemic CM EF 37% transmural scar as well as a non ischemic pattern on cardiac MRI h/o syncope in the past holter in May with NSVT  s/p AICD complicated by UE DVT\par \par here today for chest pain its an ache when he walks he not have it when he is walking usually when he is sitting usually when he is sitting for a long time when he eats he has palpitations does not eat spicy food drinks a lot of coffee \par \par \par \par ecg nsr IVCD anterior and lateral t wave changes pvc9/28/2022  \par Echo 9/2022 EF 45% \par Nuclear stress test 8/2020 inferior infarct EF 46% \par

## 2022-09-28 NOTE — PROCEDURE
[Size: ______] : The left ventricular size is [unfilled] [Wall Motion: ______] : The left ventricular wall motion is [unfilled] [Ejection Fraction: ______] : The left ventricular ejection fraction is [unfilled] [Normal] : 3. No pericardial effusion. [de-identified] : Dr. Kvng Farfan (card); Dr. Brianna Tsai (PCP) [de-identified] : Nicole Petri-Schoener, JANETCS [de-identified] : shortness of breath [de-identified] : 1.2 [de-identified] : 1.1 [de-identified] : 5.3 [de-identified] : 3.7 [de-identified] : 3.9 [de-identified] : 4.5 [de-identified] : 40-03 [de-identified] : inferoseptal and apical inferior hypokinesis as well as the apical segments [de-identified] : Mild concentric left ventricular hypertrophy. There is grade II diastolic dysfunction. [de-identified] : The left atrium is normal in size. The left atrial volume index is ml/m2. [de-identified] : The aortic valve is trileaflet.  The leaflets have normal excursion.  There is no significant aortic stenosis.  There is mild aortic regurgitation. [de-identified] : There is trace pulmonic insufficiency. [de-identified] : There is minimal tricuspid regurgitation. [de-identified] : The inferior vena cava measures 1.6 cm. [de-identified] : The aortic root is dilated at 3.9 cm.  The ascending aorta and aortic are normal in size.  The pulmonary artery appears normal in size. [de-identified] : Mildly reduced left ventricular LVEF 40-45% there is inferoseptal and apical inferior hypokinesis as well as the apical segments [de-identified] : Dilated aortic root 3.9 cm  [FreeTextEntry1] : : 1955\par Age: 65y\par Sex: M \par BP: 146/80\par Height: 173 cm\par Weight: 112 kg\par BSA: 2.2 m2\par

## 2022-10-10 ENCOUNTER — RX RENEWAL (OUTPATIENT)
Age: 67
End: 2022-10-10

## 2022-10-18 ENCOUNTER — NON-APPOINTMENT (OUTPATIENT)
Age: 67
End: 2022-10-18

## 2022-10-19 ENCOUNTER — APPOINTMENT (OUTPATIENT)
Dept: INTERNAL MEDICINE | Facility: CLINIC | Age: 67
End: 2022-10-19

## 2022-10-20 LAB
ANION GAP SERPL CALC-SCNC: 15 MMOL/L
BUN SERPL-MCNC: 16 MG/DL
CALCIUM SERPL-MCNC: 10.1 MG/DL
CHLORIDE SERPL-SCNC: 102 MMOL/L
CO2 SERPL-SCNC: 25 MMOL/L
CREAT SERPL-MCNC: 1.11 MG/DL
EGFR: 72 ML/MIN/1.73M2
GLUCOSE SERPL-MCNC: 99 MG/DL
NT-PROBNP SERPL-MCNC: 128 PG/ML
POTASSIUM SERPL-SCNC: 3.9 MMOL/L
SODIUM SERPL-SCNC: 142 MMOL/L

## 2022-11-14 ENCOUNTER — APPOINTMENT (OUTPATIENT)
Dept: INTERNAL MEDICINE | Facility: CLINIC | Age: 67
End: 2022-11-14

## 2022-11-14 VITALS
HEIGHT: 71 IN | BODY MASS INDEX: 35.56 KG/M2 | HEART RATE: 67 BPM | SYSTOLIC BLOOD PRESSURE: 146 MMHG | DIASTOLIC BLOOD PRESSURE: 84 MMHG | WEIGHT: 254 LBS | TEMPERATURE: 98.6 F | OXYGEN SATURATION: 96 %

## 2022-11-14 PROCEDURE — 99214 OFFICE O/P EST MOD 30 MIN: CPT

## 2022-11-21 ENCOUNTER — RX RENEWAL (OUTPATIENT)
Age: 67
End: 2022-11-21

## 2022-11-22 ENCOUNTER — RX RENEWAL (OUTPATIENT)
Age: 67
End: 2022-11-22

## 2022-11-23 ENCOUNTER — RX RENEWAL (OUTPATIENT)
Age: 67
End: 2022-11-23

## 2022-11-25 ENCOUNTER — NON-APPOINTMENT (OUTPATIENT)
Age: 67
End: 2022-11-25

## 2022-11-25 ENCOUNTER — APPOINTMENT (OUTPATIENT)
Dept: HEART AND VASCULAR | Facility: CLINIC | Age: 67
End: 2022-11-25

## 2022-11-25 PROCEDURE — 93296 REM INTERROG EVL PM/IDS: CPT

## 2022-11-25 PROCEDURE — 93295 DEV INTERROG REMOTE 1/2/MLT: CPT

## 2022-12-28 ENCOUNTER — APPOINTMENT (OUTPATIENT)
Dept: HEART AND VASCULAR | Facility: CLINIC | Age: 67
End: 2022-12-28
Payer: MEDICARE

## 2022-12-28 VITALS
DIASTOLIC BLOOD PRESSURE: 74 MMHG | OXYGEN SATURATION: 96 % | HEART RATE: 65 BPM | HEIGHT: 71 IN | TEMPERATURE: 98.6 F | WEIGHT: 255.19 LBS | SYSTOLIC BLOOD PRESSURE: 126 MMHG | BODY MASS INDEX: 35.73 KG/M2

## 2022-12-28 PROCEDURE — 99214 OFFICE O/P EST MOD 30 MIN: CPT

## 2022-12-28 RX ORDER — LOSARTAN POTASSIUM 100 MG/1
100 TABLET, FILM COATED ORAL
Qty: 90 | Refills: 3 | Status: DISCONTINUED | COMMUNITY
Start: 2018-10-30 | End: 2022-12-28

## 2022-12-28 RX ORDER — AMLODIPINE BESYLATE 5 MG/1
5 TABLET ORAL
Qty: 90 | Refills: 1 | Status: DISCONTINUED | COMMUNITY
Start: 2022-09-08 | End: 2022-12-28

## 2022-12-28 NOTE — PROCEDURE
[Size: ______] : The left ventricular size is [unfilled] [Wall Motion: ______] : The left ventricular wall motion is [unfilled] [Ejection Fraction: ______] : The left ventricular ejection fraction is [unfilled] [Normal] : 3. No pericardial effusion. [de-identified] : Dr. Kvng Farfan (card); Dr. Brianna Tsai (PCP) [de-identified] : Nicole Petri-Schoener, JANETCS [de-identified] : shortness of breath [de-identified] : 1.2 [de-identified] : 1.1 [de-identified] : 5.3 [de-identified] : 3.7 [de-identified] : 3.9 [de-identified] : 4.5 [de-identified] : 40-23 [de-identified] : inferoseptal and apical inferior hypokinesis as well as the apical segments [de-identified] : Mild concentric left ventricular hypertrophy. There is grade II diastolic dysfunction. [de-identified] : The left atrium is normal in size. The left atrial volume index is ml/m2. [de-identified] : The aortic valve is trileaflet.  The leaflets have normal excursion.  There is no significant aortic stenosis.  There is mild aortic regurgitation. [de-identified] : There is trace pulmonic insufficiency. [de-identified] : There is minimal tricuspid regurgitation. [de-identified] : The inferior vena cava measures 1.6 cm. [de-identified] : The aortic root is dilated at 3.9 cm.  The ascending aorta and aortic are normal in size.  The pulmonary artery appears normal in size. [de-identified] : Mildly reduced left ventricular LVEF 40-45% there is inferoseptal and apical inferior hypokinesis as well as the apical segments [de-identified] : Dilated aortic root 3.9 cm  [FreeTextEntry1] : : 1955\par Age: 65y\par Sex: M \par BP: 146/80\par Height: 173 cm\par Weight: 112 kg\par BSA: 2.2 m2\par

## 2022-12-28 NOTE — ASSESSMENT
[FreeTextEntry1] : -LV dysfunction still sob when he walks stop amlodipine and losartan start entresto 49/51mg bid\par - he is on farxiga\par - CAD on atorvastatin 80 mg daily/zetia 10 an asa 81 mg dialy \par - fu in two weeks \par

## 2022-12-28 NOTE — HISTORY OF PRESENT ILLNESS
[FreeTextEntry1] : 67 M with DM HTN HPL CAD s/p PCI d RCA m LAD/D3 bifurcation in 2009 m LCX 2/2020 PAD s/p PTA of LSFA in 2015 Gout syncope ischemic CM EF 37% transmural scar as well as a non ischemic pattern on cardiac MRI h/o syncope in the past holter in May with NSVT  s/p AICD complicated by UE DVT\par \par here still mildly short of breath when he walks has some leg swelling \par \par \par \par ecg nsr IVCD anterior and lateral t wave changes pvc 9/28/2022  \par Echo 9/2022 EF 45% \par Nuclear stress test 8/2020 inferior infarct EF 46% \par

## 2023-01-09 ENCOUNTER — RX RENEWAL (OUTPATIENT)
Age: 68
End: 2023-01-09

## 2023-01-11 ENCOUNTER — APPOINTMENT (OUTPATIENT)
Dept: INTERNAL MEDICINE | Facility: CLINIC | Age: 68
End: 2023-01-11
Payer: MEDICARE

## 2023-01-11 VITALS
BODY MASS INDEX: 35.98 KG/M2 | HEIGHT: 71 IN | OXYGEN SATURATION: 97 % | SYSTOLIC BLOOD PRESSURE: 171 MMHG | DIASTOLIC BLOOD PRESSURE: 94 MMHG | WEIGHT: 257 LBS | TEMPERATURE: 97.8 F | HEART RATE: 76 BPM

## 2023-01-11 DIAGNOSIS — I82.B19 ACUTE EMBOLISM AND THROMBOSIS OF UNSPECIFIED SUBCLAVIAN VEIN: ICD-10-CM

## 2023-01-11 DIAGNOSIS — J43.9 EMPHYSEMA, UNSPECIFIED: ICD-10-CM

## 2023-01-11 PROCEDURE — 99214 OFFICE O/P EST MOD 30 MIN: CPT

## 2023-01-19 ENCOUNTER — APPOINTMENT (OUTPATIENT)
Dept: HEART AND VASCULAR | Facility: CLINIC | Age: 68
End: 2023-01-19
Payer: MEDICARE

## 2023-01-19 VITALS
BODY MASS INDEX: 36.4 KG/M2 | SYSTOLIC BLOOD PRESSURE: 136 MMHG | HEIGHT: 71 IN | TEMPERATURE: 98.5 F | OXYGEN SATURATION: 97 % | DIASTOLIC BLOOD PRESSURE: 80 MMHG | WEIGHT: 260 LBS | HEART RATE: 65 BPM

## 2023-01-19 PROCEDURE — 99214 OFFICE O/P EST MOD 30 MIN: CPT

## 2023-01-22 NOTE — PHYSICAL EXAM
[Well Developed] : well developed [Well Nourished] : well nourished [No Acute Distress] : no acute distress [No Carotid Bruit] : no carotid bruit [Normal Venous Pressure] : normal venous pressure [Normal S1, S2] : normal S1, S2 [No Murmur] : no murmur [No Rub] : no rub [No Gallop] : no gallop [Clear Lung Fields] : clear lung fields [Good Air Entry] : good air entry [No Respiratory Distress] : no respiratory distress  [Soft] : abdomen soft [Non Tender] : non-tender [No Masses/organomegaly] : no masses/organomegaly [Normal Bowel Sounds] : normal bowel sounds [Normal Gait] : normal gait [No Edema] : no edema [No Cyanosis] : no cyanosis [No Clubbing] : no clubbing [No Varicosities] : no varicosities [No Rash] : no rash [No Skin Lesions] : no skin lesions [Moves all extremities] : moves all extremities [No Focal Deficits] : no focal deficits [Normal Speech] : normal speech [Alert and Oriented] : alert and oriented [Normal memory] : normal memory [General Appearance - Well Developed] : well developed [Normal Appearance] : normal appearance [Well Groomed] : well groomed [General Appearance - Well Nourished] : well nourished [No Deformities] : no deformities [General Appearance - In No Acute Distress] : no acute distress [Normal Conjunctiva] : the conjunctiva exhibited no abnormalities [Eyelids - No Xanthelasma] : the eyelids demonstrated no xanthelasmas [Normal Oral Mucosa] : normal oral mucosa [No Oral Pallor] : no oral pallor [No Oral Cyanosis] : no oral cyanosis [Normal Jugular Venous A Waves Present] : normal jugular venous A waves present [Normal Jugular Venous V Waves Present] : normal jugular venous V waves present [No Jugular Venous Poon A Waves] : no jugular venous poon A waves [Respiration, Rhythm And Depth] : normal respiratory rhythm and effort [Exaggerated Use Of Accessory Muscles For Inspiration] : no accessory muscle use [Auscultation Breath Sounds / Voice Sounds] : lungs were clear to auscultation bilaterally [Heart Rate And Rhythm] : heart rate and rhythm were normal [Murmurs] : no murmurs present [Heart Sounds] : normal S1 and S2 [Abdomen Soft] : soft [Abdomen Tenderness] : non-tender [Abdomen Mass (___ Cm)] : no abdominal mass palpated [Abnormal Walk] : normal gait [Gait - Sufficient For Exercise Testing] : the gait was sufficient for exercise testing [Nail Clubbing] : no clubbing of the fingernails [Cyanosis, Localized] : no localized cyanosis [Petechial Hemorrhages (___cm)] : no petechial hemorrhages [FreeTextEntry1] : bilateral leg swelling [Skin Color & Pigmentation] : normal skin color and pigmentation [] : no rash [No Venous Stasis] : no venous stasis [Skin Lesions] : no skin lesions [No Skin Ulcers] : no skin ulcer [No Xanthoma] : no  xanthoma was observed [Oriented To Time, Place, And Person] : oriented to person, place, and time [Affect] : the affect was normal [Mood] : the mood was normal [No Anxiety] : not feeling anxious

## 2023-01-22 NOTE — HISTORY OF PRESENT ILLNESS
[FreeTextEntry1] : 67 M with DM HTN HPL CAD s/p PCI d RCA m LAD/D3 bifurcation in 2009 m LCX 2/2020 PAD s/p PTA of LSFA in 2015 Gout syncope ischemic CM EF 37% transmural scar as well as a non ischemic pattern on cardiac MRI h/o syncope in the past holter in May with NSVT  s/p AICD complicated by UE DVT\par \par here still mildly short of breath when he walks has some leg swelling was supposed to start entresto but did not start due to cost did not call for authorization \par \par \par \par ecg nsr IVCD anterior and lateral t wave changes pvc 9/28/2022  \par Echo 9/2022 EF 45% \par Nuclear stress test 8/2020 inferior infarct EF 46% \par

## 2023-02-01 ENCOUNTER — APPOINTMENT (OUTPATIENT)
Dept: HEART AND VASCULAR | Facility: CLINIC | Age: 68
End: 2023-02-01
Payer: MEDICARE

## 2023-02-01 VITALS
SYSTOLIC BLOOD PRESSURE: 181 MMHG | BODY MASS INDEX: 36.03 KG/M2 | HEART RATE: 85 BPM | TEMPERATURE: 98 F | DIASTOLIC BLOOD PRESSURE: 92 MMHG | OXYGEN SATURATION: 96 % | WEIGHT: 257.37 LBS | HEIGHT: 71 IN

## 2023-02-01 DIAGNOSIS — I51.9 HEART DISEASE, UNSPECIFIED: ICD-10-CM

## 2023-02-01 PROCEDURE — 36415 COLL VENOUS BLD VENIPUNCTURE: CPT

## 2023-02-01 PROCEDURE — 99214 OFFICE O/P EST MOD 30 MIN: CPT

## 2023-02-02 NOTE — PHYSICAL EXAM
[Well Developed] : well developed [Well Nourished] : well nourished [No Acute Distress] : no acute distress [Normal Venous Pressure] : normal venous pressure [No Carotid Bruit] : no carotid bruit [Normal S1, S2] : normal S1, S2 [No Murmur] : no murmur [No Rub] : no rub [No Gallop] : no gallop [Clear Lung Fields] : clear lung fields [Good Air Entry] : good air entry [No Respiratory Distress] : no respiratory distress  [Soft] : abdomen soft [Non Tender] : non-tender [No Masses/organomegaly] : no masses/organomegaly [Normal Bowel Sounds] : normal bowel sounds [Normal Gait] : normal gait [No Edema] : no edema [No Cyanosis] : no cyanosis [No Clubbing] : no clubbing [No Varicosities] : no varicosities [No Rash] : no rash [No Skin Lesions] : no skin lesions [Moves all extremities] : moves all extremities [No Focal Deficits] : no focal deficits [Normal Speech] : normal speech [Alert and Oriented] : alert and oriented [Normal memory] : normal memory [General Appearance - Well Developed] : well developed [Normal Appearance] : normal appearance [Well Groomed] : well groomed [General Appearance - Well Nourished] : well nourished [No Deformities] : no deformities [General Appearance - In No Acute Distress] : no acute distress [Normal Conjunctiva] : the conjunctiva exhibited no abnormalities [Eyelids - No Xanthelasma] : the eyelids demonstrated no xanthelasmas [Normal Oral Mucosa] : normal oral mucosa [No Oral Pallor] : no oral pallor [No Oral Cyanosis] : no oral cyanosis [Normal Jugular Venous A Waves Present] : normal jugular venous A waves present [No Jugular Venous Poon A Waves] : no jugular venous poon A waves [Normal Jugular Venous V Waves Present] : normal jugular venous V waves present [Respiration, Rhythm And Depth] : normal respiratory rhythm and effort [Exaggerated Use Of Accessory Muscles For Inspiration] : no accessory muscle use [Auscultation Breath Sounds / Voice Sounds] : lungs were clear to auscultation bilaterally [Heart Rate And Rhythm] : heart rate and rhythm were normal [Heart Sounds] : normal S1 and S2 [Murmurs] : no murmurs present [Abdomen Soft] : soft [Abdomen Tenderness] : non-tender [Abdomen Mass (___ Cm)] : no abdominal mass palpated [Abnormal Walk] : normal gait [Gait - Sufficient For Exercise Testing] : the gait was sufficient for exercise testing [Nail Clubbing] : no clubbing of the fingernails [Cyanosis, Localized] : no localized cyanosis [Petechial Hemorrhages (___cm)] : no petechial hemorrhages [Skin Color & Pigmentation] : normal skin color and pigmentation [] : no rash [No Venous Stasis] : no venous stasis [Skin Lesions] : no skin lesions [No Skin Ulcers] : no skin ulcer [No Xanthoma] : no  xanthoma was observed [Affect] : the affect was normal [Oriented To Time, Place, And Person] : oriented to person, place, and time [Mood] : the mood was normal [No Anxiety] : not feeling anxious [FreeTextEntry1] : bilateral leg swelling

## 2023-02-02 NOTE — PROCEDURE
[Size: ______] : The left ventricular size is [unfilled] [Wall Motion: ______] : The left ventricular wall motion is [unfilled] [Ejection Fraction: ______] : The left ventricular ejection fraction is [unfilled] [Normal] : 3. No pericardial effusion. [de-identified] : Dr. Kvng Farfan (card); Dr. Brianna Tsai (PCP) [de-identified] : shortness of breath [de-identified] : Nicole Petri-Schoener, JANETCS [de-identified] : 1.2 [de-identified] : 1.1 [de-identified] : 5.3 [de-identified] : 3.7 [de-identified] : 3.9 [de-identified] : 4.5 [de-identified] : 40-36 [de-identified] : inferoseptal and apical inferior hypokinesis as well as the apical segments [de-identified] : Mild concentric left ventricular hypertrophy. There is grade II diastolic dysfunction. [de-identified] : The left atrium is normal in size. The left atrial volume index is ml/m2. [de-identified] : The aortic valve is trileaflet.  The leaflets have normal excursion.  There is no significant aortic stenosis.  There is mild aortic regurgitation. [de-identified] : There is trace pulmonic insufficiency. [de-identified] : There is minimal tricuspid regurgitation. [de-identified] : The inferior vena cava measures 1.6 cm. [de-identified] : The aortic root is dilated at 3.9 cm.  The ascending aorta and aortic are normal in size.  The pulmonary artery appears normal in size. [de-identified] : Mildly reduced left ventricular LVEF 40-45% there is inferoseptal and apical inferior hypokinesis as well as the apical segments [de-identified] : Dilated aortic root 3.9 cm  [FreeTextEntry1] : : 1955\par Age: 65y\par Sex: M \par BP: 146/80\par Height: 173 cm\par Weight: 112 kg\par BSA: 2.2 m2\par

## 2023-02-02 NOTE — HISTORY OF PRESENT ILLNESS
[FreeTextEntry1] : 67 M with DM HTN HPL CAD s/p PCI d RCA m LAD/D3 bifurcation in 2009 m LCX 2/2020 PAD s/p PTA of LSFA in 2015 Gout syncope ischemic CM EF 37% transmural scar as well as a non ischemic pattern on cardiac MRI h/o syncope in the past holter in May with NSVT  s/p AICD complicated by UE DVT\par \par very confused about his medications he has palpitations at night did not take his meds at all today did not follow instructions \par \par \par \par ecg nsr IVCD anterior and lateral t wave changes pvc 9/28/2022  \par Echo 9/2022 EF 45% \par Nuclear stress test 8/2020 inferior infarct EF 46% \par

## 2023-02-13 RX ORDER — DAPAGLIFLOZIN 10 MG/1
10 TABLET, FILM COATED ORAL
Qty: 90 | Refills: 2 | Status: ACTIVE | COMMUNITY
Start: 2022-09-28

## 2023-02-21 ENCOUNTER — NON-APPOINTMENT (OUTPATIENT)
Age: 68
End: 2023-02-21

## 2023-02-21 ENCOUNTER — APPOINTMENT (OUTPATIENT)
Dept: HEART AND VASCULAR | Facility: CLINIC | Age: 68
End: 2023-02-21
Payer: MEDICARE

## 2023-02-21 VITALS
TEMPERATURE: 98.4 F | HEIGHT: 71 IN | HEART RATE: 75 BPM | DIASTOLIC BLOOD PRESSURE: 80 MMHG | SYSTOLIC BLOOD PRESSURE: 158 MMHG | BODY MASS INDEX: 35.98 KG/M2 | OXYGEN SATURATION: 97 % | WEIGHT: 256.99 LBS

## 2023-02-21 PROCEDURE — 36415 COLL VENOUS BLD VENIPUNCTURE: CPT

## 2023-02-21 PROCEDURE — 93000 ELECTROCARDIOGRAM COMPLETE: CPT

## 2023-02-21 PROCEDURE — 99215 OFFICE O/P EST HI 40 MIN: CPT

## 2023-02-21 RX ORDER — TORSEMIDE 20 MG/1
20 TABLET ORAL
Refills: 0 | Status: DISCONTINUED | COMMUNITY
Start: 2023-02-01 | End: 2023-02-21

## 2023-02-22 LAB
ANION GAP SERPL CALC-SCNC: 13 MMOL/L
ANION GAP SERPL CALC-SCNC: 15 MMOL/L
BUN SERPL-MCNC: 12 MG/DL
BUN SERPL-MCNC: 16 MG/DL
CALCIUM SERPL-MCNC: 10.1 MG/DL
CALCIUM SERPL-MCNC: 9.9 MG/DL
CHLORIDE SERPL-SCNC: 103 MMOL/L
CHLORIDE SERPL-SCNC: 105 MMOL/L
CO2 SERPL-SCNC: 20 MMOL/L
CO2 SERPL-SCNC: 24 MMOL/L
CREAT SERPL-MCNC: 1.11 MG/DL
CREAT SERPL-MCNC: 1.12 MG/DL
EGFR: 71 ML/MIN/1.73M2
EGFR: 71 ML/MIN/1.73M2
GLUCOSE SERPL-MCNC: 113 MG/DL
GLUCOSE SERPL-MCNC: 96 MG/DL
NT-PROBNP SERPL-MCNC: 420 PG/ML
NT-PROBNP SERPL-MCNC: 612 PG/ML
POTASSIUM SERPL-SCNC: 4.4 MMOL/L
POTASSIUM SERPL-SCNC: 4.6 MMOL/L
SODIUM SERPL-SCNC: 138 MMOL/L
SODIUM SERPL-SCNC: 142 MMOL/L

## 2023-02-22 NOTE — PROCEDURE
[Size: ______] : The left ventricular size is [unfilled] [Wall Motion: ______] : The left ventricular wall motion is [unfilled] [Ejection Fraction: ______] : The left ventricular ejection fraction is [unfilled] [Normal] : 3. No pericardial effusion. [de-identified] : Dr. Kvng Farfan (card); Dr. Brianna Tsai (PCP) [de-identified] : Nicole Petri-Schoener, JANETCS [de-identified] : shortness of breath [de-identified] : 1.2 [de-identified] : 1.1 [de-identified] : 5.3 [de-identified] : 3.7 [de-identified] : 3.9 [de-identified] : 4.5 [de-identified] : 40-26 [de-identified] : inferoseptal and apical inferior hypokinesis as well as the apical segments [de-identified] : Mild concentric left ventricular hypertrophy. There is grade II diastolic dysfunction. [de-identified] : The left atrium is normal in size. The left atrial volume index is ml/m2. [de-identified] : The aortic valve is trileaflet.  The leaflets have normal excursion.  There is no significant aortic stenosis.  There is mild aortic regurgitation. [de-identified] : There is trace pulmonic insufficiency. [de-identified] : There is minimal tricuspid regurgitation. [de-identified] : The inferior vena cava measures 1.6 cm. [de-identified] : The aortic root is dilated at 3.9 cm.  The ascending aorta and aortic are normal in size.  The pulmonary artery appears normal in size. [de-identified] : Mildly reduced left ventricular LVEF 40-45% there is inferoseptal and apical inferior hypokinesis as well as the apical segments [de-identified] : Dilated aortic root 3.9 cm  [FreeTextEntry1] : : 1955\par Age: 65y\par Sex: M \par BP: 146/80\par Height: 173 cm\par Weight: 112 kg\par BSA: 2.2 m2\par

## 2023-02-22 NOTE — ASSESSMENT
[FreeTextEntry1] : - LV dysfunction he took losartan and entresto again SPENT EXTENSIVE TIME SORTING MEDICATIONS\par - he is on farxiga 10 mg daily\par - CAD on atorvastatin 80 mg daily/zetia 10 an asa 81 mg daily\par - INcrease toprol to  100 mg bid\par - needs to go to EPS for device check\par - fu in two weeks MUST BRING HIS BOTTLES \par

## 2023-02-22 NOTE — PHYSICAL EXAM
[Well Developed] : well developed [Well Nourished] : well nourished [No Acute Distress] : no acute distress [Normal Venous Pressure] : normal venous pressure [No Carotid Bruit] : no carotid bruit [Normal S1, S2] : normal S1, S2 [No Murmur] : no murmur [No Rub] : no rub [No Gallop] : no gallop [Clear Lung Fields] : clear lung fields [Good Air Entry] : good air entry [No Respiratory Distress] : no respiratory distress  [Soft] : abdomen soft [Non Tender] : non-tender [No Masses/organomegaly] : no masses/organomegaly [Normal Bowel Sounds] : normal bowel sounds [Normal Gait] : normal gait [No Edema] : no edema [No Cyanosis] : no cyanosis [No Clubbing] : no clubbing [No Varicosities] : no varicosities [No Rash] : no rash [No Skin Lesions] : no skin lesions [Moves all extremities] : moves all extremities [No Focal Deficits] : no focal deficits [Normal Speech] : normal speech [Alert and Oriented] : alert and oriented [Normal memory] : normal memory [General Appearance - Well Developed] : well developed [Normal Appearance] : normal appearance [Well Groomed] : well groomed [General Appearance - Well Nourished] : well nourished [No Deformities] : no deformities [General Appearance - In No Acute Distress] : no acute distress [Normal Conjunctiva] : the conjunctiva exhibited no abnormalities [Eyelids - No Xanthelasma] : the eyelids demonstrated no xanthelasmas [Normal Oral Mucosa] : normal oral mucosa [No Oral Pallor] : no oral pallor [No Oral Cyanosis] : no oral cyanosis [Normal Jugular Venous A Waves Present] : normal jugular venous A waves present [Normal Jugular Venous V Waves Present] : normal jugular venous V waves present [No Jugular Venous Poon A Waves] : no jugular venous poon A waves [Respiration, Rhythm And Depth] : normal respiratory rhythm and effort [Exaggerated Use Of Accessory Muscles For Inspiration] : no accessory muscle use [Auscultation Breath Sounds / Voice Sounds] : lungs were clear to auscultation bilaterally [Heart Rate And Rhythm] : heart rate and rhythm were normal [Heart Sounds] : normal S1 and S2 [Murmurs] : no murmurs present [Abdomen Soft] : soft [Abdomen Tenderness] : non-tender [Abdomen Mass (___ Cm)] : no abdominal mass palpated [Abnormal Walk] : normal gait [Gait - Sufficient For Exercise Testing] : the gait was sufficient for exercise testing [Nail Clubbing] : no clubbing of the fingernails [Cyanosis, Localized] : no localized cyanosis [Petechial Hemorrhages (___cm)] : no petechial hemorrhages [FreeTextEntry1] : bilateral leg swelling [Skin Color & Pigmentation] : normal skin color and pigmentation [] : no rash [No Venous Stasis] : no venous stasis [Skin Lesions] : no skin lesions [No Skin Ulcers] : no skin ulcer [No Xanthoma] : no  xanthoma was observed [Oriented To Time, Place, And Person] : oriented to person, place, and time [Affect] : the affect was normal [Mood] : the mood was normal [No Anxiety] : not feeling anxious

## 2023-02-22 NOTE — HISTORY OF PRESENT ILLNESS
[FreeTextEntry1] : 68 M with DM HTN HPL CAD s/p PCI d RCA m LAD/D3 bifurcation in 2009 m LCX 2/2020 PAD s/p PTA of LSFA in 2015 Gout syncope ischemic CM EF 37% transmural scar as well as a non ischemic pattern on cardiac MRI h/o syncope in the past holter in May with NSVT  s/p AICD complicated by UE DVT\par \par very confused about his medications again he does not know what he needs to take reports lightheadedness was taking losartan and entresto combined\par \par \par \par ecg nsr IVCD anterior and lateral t wave changes pvc 2/21/2023\par Echo 9/2022 EF 45% \par Nuclear stress test 8/2020 inferior infarct EF 46% \par

## 2023-02-23 ENCOUNTER — RX RENEWAL (OUTPATIENT)
Age: 68
End: 2023-02-23

## 2023-02-27 ENCOUNTER — APPOINTMENT (OUTPATIENT)
Dept: HEART AND VASCULAR | Facility: CLINIC | Age: 68
End: 2023-02-27
Payer: MEDICARE

## 2023-02-27 VITALS
DIASTOLIC BLOOD PRESSURE: 91 MMHG | TEMPERATURE: 97.9 F | HEART RATE: 71 BPM | BODY MASS INDEX: 35.84 KG/M2 | SYSTOLIC BLOOD PRESSURE: 174 MMHG | HEIGHT: 71 IN | WEIGHT: 256 LBS

## 2023-02-27 PROCEDURE — 93283 PRGRMG EVAL IMPLANTABLE DFB: CPT

## 2023-02-27 NOTE — DISCUSSION/SUMMARY
[FreeTextEntry1] : Mr. Sigala is a 68 year-old male with a history of mixed cardiomyopathy (now recovered) along with extensive scarring (including transmural scar of the inferior wall) and syncope.  He is s/p EPS 5/25/22 significant for significant infrahisian disease and NSVT.  He is s/p dual chamber ICD placement 5/25/22.  The device is functioning appropriately as programmed and all measured data is WNL.  The patient is enrolled in remote monitoring and was asked to return for follow-up in six months.  Advised to call with any questions or concerns in the interim
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
## 2023-02-27 NOTE — ADDENDUM
[FreeTextEntry1] : I, German Huston, hereby attest that the medical record entry for this patient accurately reflects signatures/notations that I made on the Date of Service in my capacity as an Attending Physician when I treated/diagnosed the above patient. I do hereby attest that this information is true, accurate and complete to the best of my knowledge.  I was present for the entire visit and supervised the entire visit and made/agree with the plan as outlined.\par

## 2023-03-23 ENCOUNTER — NON-APPOINTMENT (OUTPATIENT)
Age: 68
End: 2023-03-23

## 2023-03-23 ENCOUNTER — APPOINTMENT (OUTPATIENT)
Dept: HEART AND VASCULAR | Facility: CLINIC | Age: 68
End: 2023-03-23
Payer: MEDICARE

## 2023-03-23 VITALS
BODY MASS INDEX: 35.7 KG/M2 | TEMPERATURE: 98.8 F | WEIGHT: 254.99 LBS | HEART RATE: 68 BPM | HEIGHT: 71 IN | SYSTOLIC BLOOD PRESSURE: 118 MMHG | OXYGEN SATURATION: 97 % | DIASTOLIC BLOOD PRESSURE: 76 MMHG

## 2023-03-23 VITALS — HEART RATE: 68 BPM

## 2023-03-23 PROCEDURE — 99214 OFFICE O/P EST MOD 30 MIN: CPT

## 2023-03-23 PROCEDURE — 93000 ELECTROCARDIOGRAM COMPLETE: CPT

## 2023-03-23 PROCEDURE — 36415 COLL VENOUS BLD VENIPUNCTURE: CPT

## 2023-03-24 NOTE — PROCEDURE
[Size: ______] : The left ventricular size is [unfilled] [Wall Motion: ______] : The left ventricular wall motion is [unfilled] [Ejection Fraction: ______] : The left ventricular ejection fraction is [unfilled] [Normal] : 3. No pericardial effusion. [de-identified] : Dr. Kvng Farfan (card); Dr. Brianna Tsai (PCP) [de-identified] : Nicole Petri-Schoener, JANETCS [de-identified] : shortness of breath [de-identified] : 1.2 [de-identified] : 1.1 [de-identified] : 5.3 [de-identified] : 3.7 [de-identified] : 3.9 [de-identified] : 40-71 [de-identified] : 4.5 [de-identified] : inferoseptal and apical inferior hypokinesis as well as the apical segments [de-identified] : Mild concentric left ventricular hypertrophy. There is grade II diastolic dysfunction. [de-identified] : The left atrium is normal in size. The left atrial volume index is ml/m2. [de-identified] : The aortic valve is trileaflet.  The leaflets have normal excursion.  There is no significant aortic stenosis.  There is mild aortic regurgitation. [de-identified] : There is trace pulmonic insufficiency. [de-identified] : There is minimal tricuspid regurgitation. [de-identified] : The inferior vena cava measures 1.6 cm. [de-identified] : The aortic root is dilated at 3.9 cm.  The ascending aorta and aortic are normal in size.  The pulmonary artery appears normal in size. [de-identified] : Mildly reduced left ventricular LVEF 40-45% there is inferoseptal and apical inferior hypokinesis as well as the apical segments [de-identified] : Dilated aortic root 3.9 cm  [FreeTextEntry1] : : 1955\par Age: 65y\par Sex: M \par BP: 146/80\par Height: 173 cm\par Weight: 112 kg\par BSA: 2.2 m2\par

## 2023-03-24 NOTE — HISTORY OF PRESENT ILLNESS
[FreeTextEntry1] : 68 M with DM HTN HPL CAD s/p PCI d RCA m LAD/D3 bifurcation in 2009 m LCX 2/2020 PAD s/p PTA of LSFA in 2015 Gout syncope ischemic CM EF 37% transmural scar as well as a non ischemic pattern on cardiac MRI h/o syncope in the past holter in May with NSVT  s/p AICD complicated by UE DVT [facility]\par \par \par returns for follow up he feels great tolerating meds well \par \par \par \par ecg nsr infeiror infarct 3/23/2023\par Echo 9/2022 EF 45% \par Nuclear stress test 8/2020 inferior infarct EF 46% \par

## 2023-03-24 NOTE — ASSESSMENT
[FreeTextEntry1] : - LV dysfunction on entresto 97/101 bid\par - he is on farxiga 10 mg daily\par - CAD on atorvastatin 80 mg daily/zetia 10 an asa 81 mg daily\par - on toprol to  100 mg bid\par - fu in three months\par

## 2023-03-29 ENCOUNTER — APPOINTMENT (OUTPATIENT)
Dept: NEUROLOGY | Facility: CLINIC | Age: 68
End: 2023-03-29
Payer: MEDICARE

## 2023-03-29 VITALS
OXYGEN SATURATION: 97 % | DIASTOLIC BLOOD PRESSURE: 84 MMHG | HEART RATE: 59 BPM | HEIGHT: 71 IN | BODY MASS INDEX: 35.7 KG/M2 | SYSTOLIC BLOOD PRESSURE: 125 MMHG | TEMPERATURE: 97 F | WEIGHT: 255 LBS

## 2023-03-29 DIAGNOSIS — G31.84 MILD COGNITIVE IMPAIRMENT, SO STATED: ICD-10-CM

## 2023-03-29 PROCEDURE — 99203 OFFICE O/P NEW LOW 30 MIN: CPT

## 2023-03-31 NOTE — PHYSICAL EXAM
[FreeTextEntry1] : Physical Exam\par Constitutional: no apparent distress\par Psychiatric: normal affect, euthymic, alert and oriented x 3\par \par Neurologic Exam:\par Mental Status: awake and alert, speech fluent and prosodic with no paraphasic errors, MOCA 20/30\par Cranial Nerves: I: deferred; II: pupils equal round and reactive III, IV, VI: extraocular movements full with no nystagmus; V: facial sensation intact and symmetric; VII: facial power symmetric; VIII: hearing intact to voice IX/X: no dysarthria; XI: shoulder shrug symmetric; XII: tongue protrudes midline\par Motor: normal bulk and tone, no orbiting or pronator drift, power 5/5 to confrontation throughout including shoulder abduction, elbow flexion and extension, wrist flexion and extension, hip flexion, knee flexion and extension, no abnormal movements\par Sensation: intact to light touch in distal upper and lower extremities bilaterally\par Coordination: finger-nose-finger intact bilaterally\par Reflexes: 2+ biceps, triceps, brachioradialis, patella\par Gait: narrow base, normal stance and stride, normal arm swing\par

## 2023-03-31 NOTE — HISTORY OF PRESENT ILLNESS
[FreeTextEntry1] : Presents for evaluation of memory loss.\par \par He reports that over the past year he has been forgetting where he put things.  He lives alone and has occasionally left the stove on or left water running.  He has not gotten lost.  He takes many medications and feels that he is able to manage them on his own.

## 2023-03-31 NOTE — ASSESSMENT
[FreeTextEntry1] : 68-year-old man with multiple cardiovascular risk factors presenting with memory complaints and found to have MOCA score of 20/30 on exam.  Differential diagnosis primarily includes vascular dementia or a neurodegenerative disorder such as Alzheimer's disease.\par \par -MRI brain w/wo contrast to assess degree of vascular change and exclude other structural abnormalities\par -TSH, B12, RPR\par \par RTC after above testing

## 2023-04-12 ENCOUNTER — APPOINTMENT (OUTPATIENT)
Dept: INTERNAL MEDICINE | Facility: CLINIC | Age: 68
End: 2023-04-12
Payer: MEDICARE

## 2023-04-12 VITALS
OXYGEN SATURATION: 97 % | TEMPERATURE: 97.2 F | WEIGHT: 255 LBS | SYSTOLIC BLOOD PRESSURE: 163 MMHG | BODY MASS INDEX: 35.57 KG/M2 | DIASTOLIC BLOOD PRESSURE: 90 MMHG | HEART RATE: 60 BPM

## 2023-04-12 DIAGNOSIS — M17.12 UNILATERAL PRIMARY OSTEOARTHRITIS, LEFT KNEE: ICD-10-CM

## 2023-04-12 DIAGNOSIS — J44.9 CHRONIC OBSTRUCTIVE PULMONARY DISEASE, UNSPECIFIED: ICD-10-CM

## 2023-04-12 PROCEDURE — 99214 OFFICE O/P EST MOD 30 MIN: CPT

## 2023-04-21 ENCOUNTER — APPOINTMENT (OUTPATIENT)
Dept: MRI IMAGING | Facility: CLINIC | Age: 68
End: 2023-04-21

## 2023-05-23 ENCOUNTER — APPOINTMENT (OUTPATIENT)
Dept: UROLOGY | Facility: CLINIC | Age: 68
End: 2023-05-23
Payer: MEDICARE

## 2023-05-23 ENCOUNTER — LABORATORY RESULT (OUTPATIENT)
Age: 68
End: 2023-05-23

## 2023-05-23 VITALS — HEART RATE: 82 BPM | SYSTOLIC BLOOD PRESSURE: 171 MMHG | DIASTOLIC BLOOD PRESSURE: 87 MMHG | TEMPERATURE: 98.1 F

## 2023-05-23 PROCEDURE — 99214 OFFICE O/P EST MOD 30 MIN: CPT

## 2023-05-23 NOTE — HISTORY OF PRESENT ILLNESS
[FreeTextEntry1] : multiple comorbiidtes\par +urgency and incotinencte\par on alfuzosin 10mg\par increasing ED\par bothered anejacuation\par reports anorgasmia\par

## 2023-05-23 NOTE — ASSESSMENT
[FreeTextEntry1] : BPh\par stopped alfuzosin per nadiya\par will start finasterdie 5mg\par UA UCX\par TT E2 LH PSA\par f/u 3 months\par

## 2023-05-24 LAB
APPEARANCE: CLEAR
BACTERIA: NEGATIVE /HPF
BILIRUBIN URINE: NEGATIVE
BLOOD URINE: ABNORMAL
CAST: 0 /LPF
COLOR: YELLOW
EPITHELIAL CELLS: 0 /HPF
ESTRADIOL SERPL-MCNC: 45 PG/ML
GLUCOSE QUALITATIVE U: >=1000 MG/DL
KETONES URINE: NEGATIVE MG/DL
LEUKOCYTE ESTERASE URINE: NEGATIVE
LH SERPL-ACNC: 6.9 IU/L
MICROSCOPIC-UA: NORMAL
NITRITE URINE: NEGATIVE
PH URINE: 6.5
PROTEIN URINE: NORMAL MG/DL
PSA SERPL-MCNC: 4.55 NG/ML
RED BLOOD CELLS URINE: 2 /HPF
SPECIFIC GRAVITY URINE: 1.02
TESTOST SERPL-MCNC: 499 NG/DL
UROBILINOGEN URINE: 1 MG/DL
WHITE BLOOD CELLS URINE: 0 /HPF

## 2023-05-28 LAB — BACTERIA UR CULT: ABNORMAL

## 2023-05-30 ENCOUNTER — APPOINTMENT (OUTPATIENT)
Dept: HEART AND VASCULAR | Facility: CLINIC | Age: 68
End: 2023-05-30
Payer: MEDICARE

## 2023-05-30 ENCOUNTER — NON-APPOINTMENT (OUTPATIENT)
Age: 68
End: 2023-05-30

## 2023-05-30 DIAGNOSIS — N39.0 URINARY TRACT INFECTION, SITE NOT SPECIFIED: ICD-10-CM

## 2023-05-30 PROCEDURE — 93296 REM INTERROG EVL PM/IDS: CPT

## 2023-05-30 PROCEDURE — 93295 DEV INTERROG REMOTE 1/2/MLT: CPT

## 2023-05-31 ENCOUNTER — NON-APPOINTMENT (OUTPATIENT)
Age: 68
End: 2023-05-31

## 2023-05-31 ENCOUNTER — APPOINTMENT (OUTPATIENT)
Dept: HEART AND VASCULAR | Facility: CLINIC | Age: 68
End: 2023-05-31
Payer: MEDICARE

## 2023-05-31 VITALS
HEART RATE: 80 BPM | OXYGEN SATURATION: 95 % | WEIGHT: 253 LBS | SYSTOLIC BLOOD PRESSURE: 140 MMHG | DIASTOLIC BLOOD PRESSURE: 84 MMHG | TEMPERATURE: 98.5 F | BODY MASS INDEX: 35.42 KG/M2 | HEIGHT: 71 IN

## 2023-05-31 LAB
ALBUMIN SERPL ELPH-MCNC: 4.5 G/DL
ALP BLD-CCNC: 68 U/L
ALT SERPL-CCNC: 19 U/L
ANION GAP SERPL CALC-SCNC: 14 MMOL/L
AST SERPL-CCNC: 20 U/L
BASOPHILS # BLD AUTO: 0.07 K/UL
BASOPHILS NFR BLD AUTO: 1.2 %
BILIRUB SERPL-MCNC: 0.7 MG/DL
BUN SERPL-MCNC: 12 MG/DL
CALCIUM SERPL-MCNC: 9.6 MG/DL
CHLORIDE SERPL-SCNC: 106 MMOL/L
CHOLEST SERPL-MCNC: 138 MG/DL
CO2 SERPL-SCNC: 23 MMOL/L
CREAT SERPL-MCNC: 1.05 MG/DL
CREAT SPEC-SCNC: 73 MG/DL
EGFR: 76 ML/MIN/1.73M2
EOSINOPHIL # BLD AUTO: 0.08 K/UL
EOSINOPHIL NFR BLD AUTO: 1.4 %
ESTIMATED AVERAGE GLUCOSE: 137 MG/DL
GLUCOSE SERPL-MCNC: 94 MG/DL
HBA1C MFR BLD HPLC: 6.4 %
HCT VFR BLD CALC: 49.7 %
HDLC SERPL-MCNC: 44 MG/DL
HGB BLD-MCNC: 16.6 G/DL
IMM GRANULOCYTES NFR BLD AUTO: 0.2 %
LDLC SERPL CALC-MCNC: 77 MG/DL
LYMPHOCYTES # BLD AUTO: 2.72 K/UL
LYMPHOCYTES NFR BLD AUTO: 47.1 %
MAN DIFF?: NORMAL
MCHC RBC-ENTMCNC: 31 PG
MCHC RBC-ENTMCNC: 33.4 GM/DL
MCV RBC AUTO: 92.9 FL
MICROALBUMIN 24H UR DL<=1MG/L-MCNC: 17.4 MG/DL
MICROALBUMIN/CREAT 24H UR-RTO: 239 MG/G
MONOCYTES # BLD AUTO: 0.61 K/UL
MONOCYTES NFR BLD AUTO: 10.6 %
NEUTROPHILS # BLD AUTO: 2.29 K/UL
NEUTROPHILS NFR BLD AUTO: 39.5 %
NONHDLC SERPL-MCNC: 94 MG/DL
NT-PROBNP SERPL-MCNC: 497 PG/ML
PLATELET # BLD AUTO: 316 K/UL
POTASSIUM SERPL-SCNC: 4.7 MMOL/L
PROT SERPL-MCNC: 7.2 G/DL
RBC # BLD: 5.35 M/UL
RBC # FLD: 15.9 %
SODIUM SERPL-SCNC: 143 MMOL/L
TRIGL SERPL-MCNC: 83 MG/DL
WBC # FLD AUTO: 5.78 K/UL

## 2023-05-31 PROCEDURE — 99214 OFFICE O/P EST MOD 30 MIN: CPT

## 2023-05-31 PROCEDURE — 36415 COLL VENOUS BLD VENIPUNCTURE: CPT

## 2023-05-31 PROCEDURE — 93000 ELECTROCARDIOGRAM COMPLETE: CPT

## 2023-05-31 NOTE — HISTORY OF PRESENT ILLNESS
[FreeTextEntry1] : 68 M with DM HTN HPL CAD s/p PCI d RCA m LAD/D3 bifurcation in 2009 m LCX 2/2020 PAD s/p PTA of LSFA in 2015 Gout syncope ischemic CM EF 37% transmural scar as well as a non ischemic pattern on cardiac MRI h/o syncope in the past holter in May with NSVT  s/p AICD complicated by UE DVT [facility]\par \par \par returns for follow up he feels great tolerating meds well \par \par \par \par ecg nsr infeiror infarct 5/31/2023\par Echo 9/2022 EF 45% \par Nuclear stress test 8/2020 inferior infarct EF 46% \par

## 2023-05-31 NOTE — ASSESSMENT
[FreeTextEntry1] : - LV dysfunction on entresto 97/101 bid\par - he is on farxiga 10 mg daily\par - CAD on atorvastatin 80 mg daily/zetia 10 an asa 81 mg daily\par - on toprol to  100 mg bid\par - add aldactone 25 mg daily has mild leg edema on exam today\par - fu in three months\par

## 2023-05-31 NOTE — PROCEDURE
[Size: ______] : The left ventricular size is [unfilled] [Wall Motion: ______] : The left ventricular wall motion is [unfilled] [Ejection Fraction: ______] : The left ventricular ejection fraction is [unfilled] [Normal] : 3. No pericardial effusion. [de-identified] : Dr. Kvng Farfan (card); Dr. Brianna Tsai (PCP) [de-identified] : Nicole Petri-Schoener, JANETCS [de-identified] : shortness of breath [de-identified] : 1.2 [de-identified] : 1.1 [de-identified] : 5.3 [de-identified] : 3.7 [de-identified] : 3.9 [de-identified] : 4.5 [de-identified] : 40-92 [de-identified] : inferoseptal and apical inferior hypokinesis as well as the apical segments [de-identified] : Mild concentric left ventricular hypertrophy. There is grade II diastolic dysfunction. [de-identified] : The left atrium is normal in size. The left atrial volume index is ml/m2. [de-identified] : The aortic valve is trileaflet.  The leaflets have normal excursion.  There is no significant aortic stenosis.  There is mild aortic regurgitation. [de-identified] : There is trace pulmonic insufficiency. [de-identified] : There is minimal tricuspid regurgitation. [de-identified] : The inferior vena cava measures 1.6 cm. [de-identified] : The aortic root is dilated at 3.9 cm.  The ascending aorta and aortic are normal in size.  The pulmonary artery appears normal in size. [de-identified] : Mildly reduced left ventricular LVEF 40-45% there is inferoseptal and apical inferior hypokinesis as well as the apical segments [de-identified] : Dilated aortic root 3.9 cm  [FreeTextEntry1] : : 1955\par Age: 65y\par Sex: M \par BP: 146/80\par Height: 173 cm\par Weight: 112 kg\par BSA: 2.2 m2\par

## 2023-06-01 LAB
ALBUMIN SERPL ELPH-MCNC: 4.3 G/DL
ALP BLD-CCNC: 62 U/L
ALT SERPL-CCNC: 13 U/L
ANION GAP SERPL CALC-SCNC: 15 MMOL/L
AST SERPL-CCNC: 16 U/L
BILIRUB SERPL-MCNC: 0.9 MG/DL
BUN SERPL-MCNC: 15 MG/DL
CALCIUM SERPL-MCNC: 9.9 MG/DL
CHLORIDE SERPL-SCNC: 103 MMOL/L
CHOLEST SERPL-MCNC: 138 MG/DL
CO2 SERPL-SCNC: 23 MMOL/L
CREAT SERPL-MCNC: 1.05 MG/DL
CREAT SPEC-SCNC: 97 MG/DL
EGFR: 76 ML/MIN/1.73M2
ESTIMATED AVERAGE GLUCOSE: 134 MG/DL
GLUCOSE SERPL-MCNC: 99 MG/DL
HBA1C MFR BLD HPLC: 6.3 %
HDLC SERPL-MCNC: 43 MG/DL
LDLC SERPL CALC-MCNC: 77 MG/DL
MICROALBUMIN 24H UR DL<=1MG/L-MCNC: 7 MG/DL
MICROALBUMIN/CREAT 24H UR-RTO: 72 MG/G
NONHDLC SERPL-MCNC: 95 MG/DL
NT-PROBNP SERPL-MCNC: 314 PG/ML
POTASSIUM SERPL-SCNC: 4.1 MMOL/L
PROT SERPL-MCNC: 6.9 G/DL
SODIUM SERPL-SCNC: 142 MMOL/L
TRIGL SERPL-MCNC: 90 MG/DL

## 2023-07-18 ENCOUNTER — APPOINTMENT (OUTPATIENT)
Dept: HEART AND VASCULAR | Facility: CLINIC | Age: 68
End: 2023-07-18
Payer: MEDICARE

## 2023-07-18 ENCOUNTER — NON-APPOINTMENT (OUTPATIENT)
Age: 68
End: 2023-07-18

## 2023-07-18 VITALS
BODY MASS INDEX: 34.16 KG/M2 | OXYGEN SATURATION: 94 % | HEART RATE: 73 BPM | SYSTOLIC BLOOD PRESSURE: 134 MMHG | DIASTOLIC BLOOD PRESSURE: 82 MMHG | HEIGHT: 71 IN | WEIGHT: 243.99 LBS | TEMPERATURE: 98.6 F

## 2023-07-18 DIAGNOSIS — M25.559 PAIN IN UNSPECIFIED HIP: ICD-10-CM

## 2023-07-18 PROCEDURE — 93000 ELECTROCARDIOGRAM COMPLETE: CPT

## 2023-07-18 PROCEDURE — 99214 OFFICE O/P EST MOD 30 MIN: CPT

## 2023-07-18 PROCEDURE — 36415 COLL VENOUS BLD VENIPUNCTURE: CPT

## 2023-07-19 NOTE — PROCEDURE
[Size: ______] : The left ventricular size is [unfilled] [Wall Motion: ______] : The left ventricular wall motion is [unfilled] [Ejection Fraction: ______] : The left ventricular ejection fraction is [unfilled] [Normal] : 3. No pericardial effusion. [de-identified] : Dr. Kvng Farfan (card); Dr. Brianna Tsai (PCP) [de-identified] : Nicole Petri-Schoener, JANETCS [de-identified] : shortness of breath [de-identified] : 1.2 [de-identified] : 1.1 [de-identified] : 5.3 [de-identified] : 3.7 [de-identified] : 3.9 [de-identified] : 4.5 [de-identified] : 40-32 [de-identified] : inferoseptal and apical inferior hypokinesis as well as the apical segments [de-identified] : Mild concentric left ventricular hypertrophy. There is grade II diastolic dysfunction. [de-identified] : The left atrium is normal in size. The left atrial volume index is ml/m2. [de-identified] : The aortic valve is trileaflet.  The leaflets have normal excursion.  There is no significant aortic stenosis.  There is mild aortic regurgitation. [de-identified] : There is trace pulmonic insufficiency. [de-identified] : There is minimal tricuspid regurgitation. [de-identified] : The inferior vena cava measures 1.6 cm. [de-identified] : The aortic root is dilated at 3.9 cm.  The ascending aorta and aortic are normal in size.  The pulmonary artery appears normal in size. [de-identified] : Mildly reduced left ventricular LVEF 40-45% there is inferoseptal and apical inferior hypokinesis as well as the apical segments [de-identified] : Dilated aortic root 3.9 cm  [FreeTextEntry1] : : 1955\par Age: 65y\par Sex: M \par BP: 146/80\par Height: 173 cm\par Weight: 112 kg\par BSA: 2.2 m2\par

## 2023-07-19 NOTE — PHYSICAL EXAM
[Well Developed] : well developed [Well Nourished] : well nourished [No Acute Distress] : no acute distress [Normal Venous Pressure] : normal venous pressure [No Carotid Bruit] : no carotid bruit [Normal S1, S2] : normal S1, S2 [No Murmur] : no murmur [No Rub] : no rub [No Gallop] : no gallop [Clear Lung Fields] : clear lung fields [Good Air Entry] : good air entry [No Respiratory Distress] : no respiratory distress  [Soft] : abdomen soft [Non Tender] : non-tender [No Masses/organomegaly] : no masses/organomegaly [Normal Bowel Sounds] : normal bowel sounds [Normal Gait] : normal gait [No Edema] : no edema [No Cyanosis] : no cyanosis [No Clubbing] : no clubbing [No Varicosities] : no varicosities [No Rash] : no rash [No Skin Lesions] : no skin lesions [Moves all extremities] : moves all extremities [No Focal Deficits] : no focal deficits [Normal Speech] : normal speech [Alert and Oriented] : alert and oriented [Normal memory] : normal memory [General Appearance - Well Developed] : well developed [Normal Appearance] : normal appearance [Well Groomed] : well groomed [General Appearance - Well Nourished] : well nourished [No Deformities] : no deformities [General Appearance - In No Acute Distress] : no acute distress [Normal Conjunctiva] : the conjunctiva exhibited no abnormalities [Eyelids - No Xanthelasma] : the eyelids demonstrated no xanthelasmas [Normal Oral Mucosa] : normal oral mucosa [No Oral Pallor] : no oral pallor [No Oral Cyanosis] : no oral cyanosis [Normal Jugular Venous A Waves Present] : normal jugular venous A waves present [Normal Jugular Venous V Waves Present] : normal jugular venous V waves present [No Jugular Venous Poon A Waves] : no jugular venous poon A waves [Respiration, Rhythm And Depth] : normal respiratory rhythm and effort [Exaggerated Use Of Accessory Muscles For Inspiration] : no accessory muscle use [Auscultation Breath Sounds / Voice Sounds] : lungs were clear to auscultation bilaterally [Heart Rate And Rhythm] : heart rate and rhythm were normal [Heart Sounds] : normal S1 and S2 [Murmurs] : no murmurs present [FreeTextEntry1] : elevated JVD [Abdomen Soft] : soft [Abdomen Tenderness] : non-tender [Abdomen Mass (___ Cm)] : no abdominal mass palpated [Abnormal Walk] : normal gait [Gait - Sufficient For Exercise Testing] : the gait was sufficient for exercise testing [Nail Clubbing] : no clubbing of the fingernails [Cyanosis, Localized] : no localized cyanosis [Petechial Hemorrhages (___cm)] : no petechial hemorrhages [Skin Color & Pigmentation] : normal skin color and pigmentation [] : no rash [No Venous Stasis] : no venous stasis [Skin Lesions] : no skin lesions [No Skin Ulcers] : no skin ulcer [No Xanthoma] : no  xanthoma was observed [Oriented To Time, Place, And Person] : oriented to person, place, and time [Affect] : the affect was normal [Mood] : the mood was normal [No Anxiety] : not feeling anxious

## 2023-07-19 NOTE — ASSESSMENT
[FreeTextEntry1] : - LV dysfunction on entresto 97/101 bid\par - he is on farxiga 10 mg daily\par - CAD on atorvastatin 80 mg daily/zetia 10 an asa 81 mg daily\par - on toprol to  100 mg bid\par - on aldactone 25 mg daily \par - fu in three months\par

## 2023-07-19 NOTE — HISTORY OF PRESENT ILLNESS
[FreeTextEntry1] : 68 M with DM HTN HPL CAD s/p PCI d RCA m LAD/D3 bifurcation in 2009 m LCX 2/2020 PAD s/p PTA of LSFA in 2015 Gout syncope ischemic CM EF 37% transmural scar as well as a non ischemic pattern on cardiac MRI h/o syncope in the past holter in May with NSVT  s/p AICD complicated by UE DVT [facility]\par \par \par returns for follow up he feels great tolerating meds well chest pain only when he takes a breath in no exertional cp he is very deconditioned not walking much at all due to hip pain \par \par \par \par ecg nsr infeiror infarct 7/18/2023\par Echo 9/2022 EF 45% \par Nuclear stress test 8/2020 inferior infarct EF 46% \par

## 2023-08-03 LAB
ALBUMIN SERPL ELPH-MCNC: 4.1 G/DL
ALP BLD-CCNC: 71 U/L
ALT SERPL-CCNC: 32 U/L
ANION GAP SERPL CALC-SCNC: 14 MMOL/L
AST SERPL-CCNC: 23 U/L
BILIRUB SERPL-MCNC: 0.7 MG/DL
BUN SERPL-MCNC: 13 MG/DL
CALCIUM SERPL-MCNC: 9.7 MG/DL
CHLORIDE SERPL-SCNC: 107 MMOL/L
CHOLEST SERPL-MCNC: 134 MG/DL
CO2 SERPL-SCNC: 21 MMOL/L
CREAT SERPL-MCNC: 1.04 MG/DL
CREAT SPEC-SCNC: 78 MG/DL
EGFR: 77 ML/MIN/1.73M2
ESTIMATED AVERAGE GLUCOSE: 134 MG/DL
GLUCOSE SERPL-MCNC: 134 MG/DL
HBA1C MFR BLD HPLC: 6.3 %
HDLC SERPL-MCNC: 41 MG/DL
LDLC SERPL CALC-MCNC: 78 MG/DL
MICROALBUMIN 24H UR DL<=1MG/L-MCNC: 1.8 MG/DL
MICROALBUMIN/CREAT 24H UR-RTO: 23 MG/G
NONHDLC SERPL-MCNC: 93 MG/DL
NT-PROBNP SERPL-MCNC: 593 PG/ML
POTASSIUM SERPL-SCNC: 4 MMOL/L
PROT SERPL-MCNC: 6.8 G/DL
SODIUM SERPL-SCNC: 142 MMOL/L
TRIGL SERPL-MCNC: 78 MG/DL

## 2023-08-22 ENCOUNTER — APPOINTMENT (OUTPATIENT)
Dept: UROLOGY | Facility: CLINIC | Age: 68
End: 2023-08-22
Payer: MEDICARE

## 2023-08-22 VITALS
HEART RATE: 59 BPM | HEIGHT: 71 IN | SYSTOLIC BLOOD PRESSURE: 153 MMHG | DIASTOLIC BLOOD PRESSURE: 86 MMHG | TEMPERATURE: 97.3 F | BODY MASS INDEX: 34.02 KG/M2 | WEIGHT: 243 LBS

## 2023-08-22 PROCEDURE — 99214 OFFICE O/P EST MOD 30 MIN: CPT

## 2023-08-22 NOTE — ASSESSMENT
[FreeTextEntry1] : BPH will dc finasterdie due to sexaul side effeects monitor PSA (stable x 3 years - mulitple comorbdiiteis) currently happy iwnikunj sypmotms will montir f/u 3 months for cysto/TRUS at that appointment if increasing symptoms he is consider outlet procedure if symptoms

## 2023-08-22 NOTE — HISTORY OF PRESENT ILLNESS
[FreeTextEntry1] : returns for followup dcd alfuzosin due to side effects and cardiology recs now on finasteride x 3-4 months baseline TT 5/2023 499 PSA 5/2023 4.55 (prior to finasteride) PSA 1/2020 4.49 multiple comorbidities improved symptoms overall decrased sexual interest and sensation

## 2023-08-28 ENCOUNTER — APPOINTMENT (OUTPATIENT)
Dept: HEART AND VASCULAR | Facility: CLINIC | Age: 68
End: 2023-08-28
Payer: MEDICARE

## 2023-08-28 ENCOUNTER — NON-APPOINTMENT (OUTPATIENT)
Age: 68
End: 2023-08-28

## 2023-08-28 VITALS
WEIGHT: 243 LBS | SYSTOLIC BLOOD PRESSURE: 160 MMHG | DIASTOLIC BLOOD PRESSURE: 84 MMHG | BODY MASS INDEX: 34.02 KG/M2 | HEIGHT: 71 IN | HEART RATE: 62 BPM | TEMPERATURE: 97.9 F

## 2023-08-28 PROCEDURE — 93283 PRGRMG EVAL IMPLANTABLE DFB: CPT
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
## 2023-08-28 NOTE — DISCUSSION/SUMMARY
[FreeTextEntry1] : Mr. Sigala is a 68 year-old male with a history of mixed cardiomyopathy (now recovered) along with extensive scarring (including transmural scar of the inferior wall) and syncope.  He is s/p EPS 5/25/22 significant for significant infrahisian disease and NSVT.  He is s/p dual chamber ICD placement 5/25/22.  The device is functioning appropriately as programmed and all measured data is WNL. No arrhythmia events for review. The patient is enrolled in remote monitoring and was asked to return for follow-up in six months.  Advised to call with any questions or concerns in the interim

## 2023-09-19 ENCOUNTER — APPOINTMENT (OUTPATIENT)
Dept: HEART AND VASCULAR | Facility: CLINIC | Age: 68
End: 2023-09-19
Payer: MEDICARE

## 2023-09-19 ENCOUNTER — NON-APPOINTMENT (OUTPATIENT)
Age: 68
End: 2023-09-19

## 2023-09-19 VITALS
HEART RATE: 60 BPM | BODY MASS INDEX: 34.3 KG/M2 | OXYGEN SATURATION: 98 % | HEIGHT: 71 IN | WEIGHT: 245 LBS | DIASTOLIC BLOOD PRESSURE: 80 MMHG | SYSTOLIC BLOOD PRESSURE: 144 MMHG

## 2023-09-19 DIAGNOSIS — R07.89 OTHER CHEST PAIN: ICD-10-CM

## 2023-09-19 PROCEDURE — 36415 COLL VENOUS BLD VENIPUNCTURE: CPT

## 2023-09-19 PROCEDURE — 93000 ELECTROCARDIOGRAM COMPLETE: CPT

## 2023-09-21 LAB
ALBUMIN SERPL ELPH-MCNC: 4.3 G/DL
ALP BLD-CCNC: 72 U/L
ALT SERPL-CCNC: 19 U/L
ANION GAP SERPL CALC-SCNC: 16 MMOL/L
AST SERPL-CCNC: 20 U/L
BILIRUB SERPL-MCNC: 0.7 MG/DL
BUN SERPL-MCNC: 9 MG/DL
CALCIUM SERPL-MCNC: 9.5 MG/DL
CHLORIDE SERPL-SCNC: 103 MMOL/L
CHOLEST SERPL-MCNC: 144 MG/DL
CO2 SERPL-SCNC: 21 MMOL/L
CREAT SERPL-MCNC: 0.9 MG/DL
CREAT SPEC-SCNC: 86 MG/DL
EGFR: 92 ML/MIN/1.73M2
ESTIMATED AVERAGE GLUCOSE: 128 MG/DL
GLUCOSE SERPL-MCNC: 92 MG/DL
HBA1C MFR BLD HPLC: 6.1 %
HDLC SERPL-MCNC: 46 MG/DL
LDLC SERPL CALC-MCNC: 80 MG/DL
MICROALBUMIN 24H UR DL<=1MG/L-MCNC: 1.8 MG/DL
MICROALBUMIN/CREAT 24H UR-RTO: 21 MG/G
NONHDLC SERPL-MCNC: 99 MG/DL
NT-PROBNP SERPL-MCNC: 562 PG/ML
POTASSIUM SERPL-SCNC: 4.9 MMOL/L
PROT SERPL-MCNC: 6.9 G/DL
SODIUM SERPL-SCNC: 139 MMOL/L
TRIGL SERPL-MCNC: 103 MG/DL

## 2023-09-24 ENCOUNTER — RX RENEWAL (OUTPATIENT)
Age: 68
End: 2023-09-24

## 2023-10-11 ENCOUNTER — APPOINTMENT (OUTPATIENT)
Dept: INTERNAL MEDICINE | Facility: CLINIC | Age: 68
End: 2023-10-11

## 2023-11-09 ENCOUNTER — NON-APPOINTMENT (OUTPATIENT)
Age: 68
End: 2023-11-09

## 2023-11-09 ENCOUNTER — APPOINTMENT (OUTPATIENT)
Dept: HEART AND VASCULAR | Facility: CLINIC | Age: 68
End: 2023-11-09
Payer: MEDICARE

## 2023-11-09 VITALS
WEIGHT: 248.99 LBS | DIASTOLIC BLOOD PRESSURE: 96 MMHG | OXYGEN SATURATION: 96 % | BODY MASS INDEX: 34.86 KG/M2 | HEART RATE: 60 BPM | TEMPERATURE: 97.5 F | SYSTOLIC BLOOD PRESSURE: 132 MMHG | HEIGHT: 71 IN

## 2023-11-09 PROCEDURE — 93000 ELECTROCARDIOGRAM COMPLETE: CPT

## 2023-11-09 PROCEDURE — 99214 OFFICE O/P EST MOD 30 MIN: CPT

## 2023-11-09 RX ORDER — ASPIRIN 81 MG/1
81 TABLET, CHEWABLE ORAL
Qty: 90 | Refills: 3 | Status: DISCONTINUED | COMMUNITY
Start: 2023-09-24 | End: 2023-11-09

## 2023-11-09 RX ORDER — ALIROCUMAB 150 MG/ML
150 INJECTION, SOLUTION SUBCUTANEOUS
Qty: 12 | Refills: 11 | Status: DISCONTINUED | COMMUNITY
Start: 2023-09-21 | End: 2023-11-09

## 2023-11-21 ENCOUNTER — APPOINTMENT (OUTPATIENT)
Dept: UROLOGY | Facility: CLINIC | Age: 68
End: 2023-11-21
Payer: MEDICARE

## 2023-11-21 VITALS
TEMPERATURE: 97.3 F | SYSTOLIC BLOOD PRESSURE: 188 MMHG | BODY MASS INDEX: 34.72 KG/M2 | WEIGHT: 248 LBS | DIASTOLIC BLOOD PRESSURE: 97 MMHG | HEART RATE: 60 BPM | HEIGHT: 71 IN

## 2023-11-21 PROCEDURE — 52000 CYSTOURETHROSCOPY: CPT

## 2023-11-21 PROCEDURE — 76872 US TRANSRECTAL: CPT

## 2023-11-21 PROCEDURE — 99213 OFFICE O/P EST LOW 20 MIN: CPT | Mod: 25

## 2023-11-21 PROCEDURE — 99214 OFFICE O/P EST MOD 30 MIN: CPT | Mod: 25,57

## 2023-11-22 ENCOUNTER — APPOINTMENT (OUTPATIENT)
Dept: CT IMAGING | Facility: CLINIC | Age: 68
End: 2023-11-22
Payer: MEDICARE

## 2023-11-22 ENCOUNTER — OUTPATIENT (OUTPATIENT)
Dept: OUTPATIENT SERVICES | Facility: HOSPITAL | Age: 68
LOS: 1 days | End: 2023-11-22

## 2023-11-22 PROCEDURE — 71250 CT THORAX DX C-: CPT | Mod: 26

## 2023-11-28 ENCOUNTER — NON-APPOINTMENT (OUTPATIENT)
Age: 68
End: 2023-11-28

## 2023-11-28 ENCOUNTER — APPOINTMENT (OUTPATIENT)
Dept: HEART AND VASCULAR | Facility: CLINIC | Age: 68
End: 2023-11-28
Payer: MEDICARE

## 2023-11-29 PROCEDURE — 93295 DEV INTERROG REMOTE 1/2/MLT: CPT

## 2023-11-29 PROCEDURE — 93296 REM INTERROG EVL PM/IDS: CPT

## 2023-12-04 ENCOUNTER — APPOINTMENT (OUTPATIENT)
Dept: HEART AND VASCULAR | Facility: CLINIC | Age: 68
End: 2023-12-04
Payer: MEDICARE

## 2023-12-04 ENCOUNTER — NON-APPOINTMENT (OUTPATIENT)
Age: 68
End: 2023-12-04

## 2023-12-04 VITALS — SYSTOLIC BLOOD PRESSURE: 150 MMHG | DIASTOLIC BLOOD PRESSURE: 88 MMHG

## 2023-12-04 VITALS
OXYGEN SATURATION: 96 % | WEIGHT: 246.98 LBS | TEMPERATURE: 98.6 F | HEART RATE: 74 BPM | HEIGHT: 71 IN | BODY MASS INDEX: 34.58 KG/M2 | DIASTOLIC BLOOD PRESSURE: 80 MMHG | SYSTOLIC BLOOD PRESSURE: 150 MMHG

## 2023-12-04 DIAGNOSIS — R00.2 PALPITATIONS: ICD-10-CM

## 2023-12-04 DIAGNOSIS — R06.02 SHORTNESS OF BREATH: ICD-10-CM

## 2023-12-04 PROCEDURE — 93000 ELECTROCARDIOGRAM COMPLETE: CPT

## 2023-12-04 PROCEDURE — 99214 OFFICE O/P EST MOD 30 MIN: CPT

## 2023-12-08 LAB
ALBUMIN SERPL ELPH-MCNC: 4.4 G/DL
ALP BLD-CCNC: 71 U/L
ALT SERPL-CCNC: 20 U/L
ANION GAP SERPL CALC-SCNC: 14 MMOL/L
AST SERPL-CCNC: 17 U/L
BILIRUB SERPL-MCNC: 0.8 MG/DL
BUN SERPL-MCNC: 13 MG/DL
CALCIUM SERPL-MCNC: 10.2 MG/DL
CHLORIDE SERPL-SCNC: 103 MMOL/L
CO2 SERPL-SCNC: 25 MMOL/L
CREAT SERPL-MCNC: 1.08 MG/DL
EGFR: 74 ML/MIN/1.73M2
ESTIMATED AVERAGE GLUCOSE: 120 MG/DL
GLUCOSE SERPL-MCNC: 93 MG/DL
HBA1C MFR BLD HPLC: 5.8 %
NT-PROBNP SERPL-MCNC: 1273 PG/ML
POTASSIUM SERPL-SCNC: 4.2 MMOL/L
PROT SERPL-MCNC: 7.2 G/DL
SODIUM SERPL-SCNC: 142 MMOL/L
TSH SERPL-ACNC: 2.03 UIU/ML

## 2023-12-18 ENCOUNTER — APPOINTMENT (OUTPATIENT)
Dept: HEART AND VASCULAR | Facility: CLINIC | Age: 68
End: 2023-12-18

## 2023-12-19 ENCOUNTER — APPOINTMENT (OUTPATIENT)
Dept: HEART AND VASCULAR | Facility: CLINIC | Age: 68
End: 2023-12-19
Payer: MEDICARE

## 2023-12-19 ENCOUNTER — NON-APPOINTMENT (OUTPATIENT)
Age: 68
End: 2023-12-19

## 2023-12-19 VITALS
BODY MASS INDEX: 34.02 KG/M2 | DIASTOLIC BLOOD PRESSURE: 98 MMHG | WEIGHT: 242.99 LBS | TEMPERATURE: 97.6 F | SYSTOLIC BLOOD PRESSURE: 160 MMHG | HEIGHT: 71 IN | HEART RATE: 61 BPM | OXYGEN SATURATION: 98 %

## 2023-12-19 DIAGNOSIS — Z86.79 PERSONAL HISTORY OF OTHER DISEASES OF THE CIRCULATORY SYSTEM: ICD-10-CM

## 2023-12-19 DIAGNOSIS — I25.10 ATHEROSCLEROTIC HEART DISEASE OF NATIVE CORONARY ARTERY W/OUT ANGINA PECTORIS: ICD-10-CM

## 2023-12-19 PROCEDURE — 36415 COLL VENOUS BLD VENIPUNCTURE: CPT

## 2023-12-19 PROCEDURE — 99214 OFFICE O/P EST MOD 30 MIN: CPT

## 2023-12-19 PROCEDURE — 93000 ELECTROCARDIOGRAM COMPLETE: CPT

## 2023-12-19 RX ORDER — CARVEDILOL 12.5 MG/1
12.5 TABLET, FILM COATED ORAL
Qty: 180 | Refills: 3 | Status: ACTIVE | COMMUNITY
Start: 2023-12-19 | End: 1900-01-01

## 2023-12-19 RX ORDER — METOPROLOL SUCCINATE 100 MG/1
100 TABLET, EXTENDED RELEASE ORAL
Qty: 180 | Refills: 3 | Status: DISCONTINUED | COMMUNITY
Start: 2022-09-28 | End: 2023-12-19

## 2023-12-19 RX ORDER — ALFUZOSIN HYDROCHLORIDE 10 MG/1
10 TABLET, EXTENDED RELEASE ORAL DAILY
Qty: 90 | Refills: 3 | Status: DISCONTINUED | COMMUNITY
Start: 2022-07-26 | End: 2023-12-19

## 2023-12-19 NOTE — HISTORY OF PRESENT ILLNESS
[FreeTextEntry1] : 68 M with DM HTN HPL CAD s/p PCI d RCA m LAD/D3 bifurcation in 2009 m LCX 2/2020 PAD s/p PTA of LSFA in 2015 Gout syncope ischemic CM EF 37% transmural scar as well as a non ischemic pattern on cardiac MRI h/o syncope in the past holter in May with NSVT  s/p AICD complicated by UE DVT [facility] Emphysema  started entresto, tolerating well. checking BPs daily -sometimes gets BPs up to 160 however it lowers after he takes it again. typical readings are around 130   ecg nsr nsst changes 12/19/23  Echo 9/2022 EF 45%  Nuclear stress test 9/21/22 small sized fixed inferior and apical defect with minimal revisability  chest CT -xyphoid process deformity, otherwise negative 11/22/23

## 2023-12-19 NOTE — PHYSICAL EXAM
[Well Developed] : well developed [Well Nourished] : well nourished [No Acute Distress] : no acute distress [Normal Venous Pressure] : normal venous pressure [No Carotid Bruit] : no carotid bruit [Normal S1, S2] : normal S1, S2 [No Murmur] : no murmur [No Rub] : no rub [No Gallop] : no gallop [Clear Lung Fields] : clear lung fields [Good Air Entry] : good air entry [No Respiratory Distress] : no respiratory distress  [Soft] : abdomen soft [Non Tender] : non-tender [No Masses/organomegaly] : no masses/organomegaly [Normal Bowel Sounds] : normal bowel sounds [Normal Gait] : normal gait [No Edema] : no edema [No Cyanosis] : no cyanosis [No Clubbing] : no clubbing [No Varicosities] : no varicosities [No Rash] : no rash [No Skin Lesions] : no skin lesions [Moves all extremities] : moves all extremities [No Focal Deficits] : no focal deficits [Normal Speech] : normal speech [Alert and Oriented] : alert and oriented [Normal memory] : normal memory [General Appearance - Well Developed] : well developed [Normal Appearance] : normal appearance [Well Groomed] : well groomed [General Appearance - Well Nourished] : well nourished [No Deformities] : no deformities [General Appearance - In No Acute Distress] : no acute distress [Normal Conjunctiva] : the conjunctiva exhibited no abnormalities [Eyelids - No Xanthelasma] : the eyelids demonstrated no xanthelasmas [Normal Oral Mucosa] : normal oral mucosa [No Oral Pallor] : no oral pallor [No Oral Cyanosis] : no oral cyanosis [Normal Jugular Venous A Waves Present] : normal jugular venous A waves present [Normal Jugular Venous V Waves Present] : normal jugular venous V waves present [No Jugular Venous Poon A Waves] : no jugular venous poon A waves [Respiration, Rhythm And Depth] : normal respiratory rhythm and effort [Exaggerated Use Of Accessory Muscles For Inspiration] : no accessory muscle use [Auscultation Breath Sounds / Voice Sounds] : lungs were clear to auscultation bilaterally [Heart Rate And Rhythm] : heart rate and rhythm were normal [Heart Sounds] : normal S1 and S2 [Murmurs] : no murmurs present [Abdomen Soft] : soft [Abdomen Tenderness] : non-tender [Abdomen Mass (___ Cm)] : no abdominal mass palpated [Abnormal Walk] : normal gait [Gait - Sufficient For Exercise Testing] : the gait was sufficient for exercise testing [Nail Clubbing] : no clubbing of the fingernails [Cyanosis, Localized] : no localized cyanosis [Petechial Hemorrhages (___cm)] : no petechial hemorrhages [Skin Color & Pigmentation] : normal skin color and pigmentation [] : no rash [No Venous Stasis] : no venous stasis [Skin Lesions] : no skin lesions [No Skin Ulcers] : no skin ulcer [No Xanthoma] : no  xanthoma was observed [Oriented To Time, Place, And Person] : oriented to person, place, and time [Affect] : the affect was normal [Mood] : the mood was normal [No Anxiety] : not feeling anxious [FreeTextEntry1] : elevated JVD

## 2023-12-19 NOTE — PROCEDURE
[Size: ______] : The left ventricular size is [unfilled] [Wall Motion: ______] : The left ventricular wall motion is [unfilled] [Ejection Fraction: ______] : The left ventricular ejection fraction is [unfilled] [Normal] : 3. No pericardial effusion. [de-identified] : Dr. Kvng Farfan (card); Dr. Brianna Tsai (PCP) [de-identified] : Nicole Petri-Schoener, JANETCS [de-identified] : shortness of breath [de-identified] : 1.2 [de-identified] : 1.1 [de-identified] : 5.3 [de-identified] : 3.7 [de-identified] : 3.9 [de-identified] : 4.5 [de-identified] : 40-14 [de-identified] : inferoseptal and apical inferior hypokinesis as well as the apical segments [de-identified] : Mild concentric left ventricular hypertrophy. There is grade II diastolic dysfunction. [de-identified] : The left atrium is normal in size. The left atrial volume index is ml/m2. [de-identified] : The aortic valve is trileaflet.  The leaflets have normal excursion.  There is no significant aortic stenosis.  There is mild aortic regurgitation. [de-identified] : There is trace pulmonic insufficiency. [de-identified] : There is minimal tricuspid regurgitation. [de-identified] : The inferior vena cava measures 1.6 cm. [de-identified] : The aortic root is dilated at 3.9 cm.  The ascending aorta and aortic are normal in size.  The pulmonary artery appears normal in size. [de-identified] : Mildly reduced left ventricular LVEF 40-45% there is inferoseptal and apical inferior hypokinesis as well as the apical segments [de-identified] : Dilated aortic root 3.9 cm  [FreeTextEntry1] : : 1955\par  Age: 65y\par  Sex: M \par  BP: 146/80\par  Height: 173 cm\par  Weight: 112 kg\par  BSA: 2.2 m2\par

## 2023-12-19 NOTE — ASSESSMENT
[FreeTextEntry1] : - LV dysfunction- euvolemic on entresto 97/101 farxiga 10 mg daily -labs today -BNP and BMP - CAD on atorvastatin 80 mg daily/zetia 10 an asa 81 mg daily - hypertensive -will switch metoprolol to carvedilol 12.5 BID -can titrate up as needed, will call the office with BPs consistently >140/90 - on aldactone 25 mg daily - fu in 3 weeks

## 2023-12-20 DIAGNOSIS — E87.5 HYPERKALEMIA: ICD-10-CM

## 2023-12-20 LAB
ANION GAP SERPL CALC-SCNC: 12 MMOL/L
BUN SERPL-MCNC: 16 MG/DL
CALCIUM SERPL-MCNC: 10.3 MG/DL
CHLORIDE SERPL-SCNC: 102 MMOL/L
CO2 SERPL-SCNC: 24 MMOL/L
CREAT SERPL-MCNC: 1.17 MG/DL
EGFR: 67 ML/MIN/1.73M2
GLUCOSE SERPL-MCNC: 88 MG/DL
NT-PROBNP SERPL-MCNC: 329 PG/ML
POTASSIUM SERPL-SCNC: 5.9 MMOL/L
SODIUM SERPL-SCNC: 139 MMOL/L

## 2023-12-21 ENCOUNTER — APPOINTMENT (OUTPATIENT)
Dept: HEART AND VASCULAR | Facility: CLINIC | Age: 68
End: 2023-12-21

## 2023-12-28 ENCOUNTER — APPOINTMENT (OUTPATIENT)
Dept: HEART AND VASCULAR | Facility: CLINIC | Age: 68
End: 2023-12-28
Payer: MEDICARE

## 2023-12-28 DIAGNOSIS — E29.1 TESTICULAR HYPOFUNCTION: ICD-10-CM

## 2023-12-28 PROCEDURE — 36415 COLL VENOUS BLD VENIPUNCTURE: CPT

## 2023-12-28 RX ORDER — TADALAFIL 5 MG/1
5 TABLET ORAL DAILY
Qty: 90 | Refills: 0 | Status: ACTIVE | COMMUNITY
Start: 2023-12-28 | End: 1900-01-01

## 2023-12-28 RX ORDER — FINASTERIDE 5 MG/1
5 TABLET, FILM COATED ORAL DAILY
Qty: 90 | Refills: 3 | Status: ACTIVE | COMMUNITY
Start: 2023-05-23 | End: 1900-01-01

## 2023-12-29 LAB
ANION GAP SERPL CALC-SCNC: 18 MMOL/L
BUN SERPL-MCNC: 20 MG/DL
CALCIUM SERPL-MCNC: 9.8 MG/DL
CHLORIDE SERPL-SCNC: 104 MMOL/L
CO2 SERPL-SCNC: 16 MMOL/L
CREAT SERPL-MCNC: 1.3 MG/DL
EGFR: 59 ML/MIN/1.73M2
GLUCOSE SERPL-MCNC: 84 MG/DL
POTASSIUM SERPL-SCNC: 4.8 MMOL/L
SODIUM SERPL-SCNC: 137 MMOL/L

## 2024-01-10 ENCOUNTER — NON-APPOINTMENT (OUTPATIENT)
Age: 69
End: 2024-01-10

## 2024-01-23 ENCOUNTER — APPOINTMENT (OUTPATIENT)
Dept: UROLOGY | Facility: CLINIC | Age: 69
End: 2024-01-23
Payer: MEDICARE

## 2024-01-23 VITALS
SYSTOLIC BLOOD PRESSURE: 123 MMHG | TEMPERATURE: 98.5 F | HEIGHT: 71 IN | WEIGHT: 242 LBS | BODY MASS INDEX: 33.88 KG/M2 | DIASTOLIC BLOOD PRESSURE: 79 MMHG | HEART RATE: 95 BPM

## 2024-01-23 PROCEDURE — 99214 OFFICE O/P EST MOD 30 MIN: CPT

## 2024-01-23 NOTE — ASSESSMENT
[FreeTextEntry1] : BPH/LUTS no longer on Finasteride discussed surgical options good TURP candidate The risks and benefits of transurethral resection of the prostate were discussed at length today. Given his symptoms and exam findings, I told him he has an approximately 70% likelihood of improvement in his LUTS/urinary retention in the postoperative period. He understands that this means that some men do not improve after surgery. The role of postop catheterization, and the potential for a longer term daley were discussed at length. The risk of urinary tract infection and possible sepsis along with its associated sequelae were discussed. The very low risk of urinary incontinence as well as bladder overactivity were discussed. The risk of gross hematuria, and the fact that he cannot perform strenous activity for one month were also reported. He understands that he is likely to have retrograde ejaculation following this procedure.   will discuss with family may schedule by phone FU x6 months

## 2024-01-23 NOTE — HISTORY OF PRESENT ILLNESS
[FreeTextEntry1] : 68M BPH/LUTS significant obstruction TRUS 47 grams cysto showed obstruction and trabeculations off Alfuzosin due to cards recs recently stopped Finasteride due to chest pain PSA 5/2023 4.55 (prior to finasteride) PSA 1/2020 4.49 here to discuss possible surgical options  currently undergoing ESW for ED

## 2024-01-31 ENCOUNTER — APPOINTMENT (OUTPATIENT)
Dept: INTERNAL MEDICINE | Facility: CLINIC | Age: 69
End: 2024-01-31

## 2024-02-07 ENCOUNTER — APPOINTMENT (OUTPATIENT)
Dept: INTERNAL MEDICINE | Facility: CLINIC | Age: 69
End: 2024-02-07
Payer: MEDICARE

## 2024-02-07 VITALS
HEIGHT: 71 IN | TEMPERATURE: 97.7 F | WEIGHT: 238 LBS | SYSTOLIC BLOOD PRESSURE: 127 MMHG | DIASTOLIC BLOOD PRESSURE: 77 MMHG | HEART RATE: 64 BPM | OXYGEN SATURATION: 96 % | BODY MASS INDEX: 33.32 KG/M2

## 2024-02-07 DIAGNOSIS — I10 ESSENTIAL (PRIMARY) HYPERTENSION: ICD-10-CM

## 2024-02-07 DIAGNOSIS — N40.1 BENIGN PROSTATIC HYPERPLASIA WITH LOWER URINARY TRACT SYMPMS: ICD-10-CM

## 2024-02-07 DIAGNOSIS — R35.1 BENIGN PROSTATIC HYPERPLASIA WITH LOWER URINARY TRACT SYMPMS: ICD-10-CM

## 2024-02-07 PROCEDURE — 99214 OFFICE O/P EST MOD 30 MIN: CPT

## 2024-02-07 PROCEDURE — G2211 COMPLEX E/M VISIT ADD ON: CPT

## 2024-02-07 NOTE — PHYSICAL EXAM
[No Acute Distress] : no acute distress [Well-Appearing] : well-appearing [No Respiratory Distress] : no respiratory distress  [Clear to Auscultation] : lungs were clear to auscultation bilaterally [Normal Rate] : normal rate  [Normal S1, S2] : normal S1 and S2 [Soft] : abdomen soft [Non Tender] : non-tender [No Rash] : no rash [No Focal Deficits] : no focal deficits

## 2024-02-07 NOTE — HISTORY OF PRESENT ILLNESS
[FreeTextEntry1] : PT is here for a follow up and only complaint is about prostate issues he stated. [de-identified] : 68M w/HTN, HPL, DM, CAD s/p PCI, PAD, syncope, ischemic CM EF 40-45% here for f/u. overall doing well, saw Dr. Collazo for cards in early december and saw urologist for ED yesterday, recommending TURP. pt still deciding if he wants procedure.  otherwise no complaints, was worried about BP, but well controlled at today's visit.

## 2024-02-07 NOTE — ASSESSMENT
[FreeTextEntry1] : 68M w/HTN, HPL, DM, CAD s/p PCI, PAD, syncope, ischemic CM EF 40-45% here for f/u. HTN - well controlled, c/w coreg 12.5mg, entresto 97-103mg Cardiomyopathy - stable, sees cards, meds as ordered BPH - sees urology, considering TURP   RTC in May for CPE with Dr. Tsai or earlier if pt needs preop for TURP

## 2024-02-26 ENCOUNTER — APPOINTMENT (OUTPATIENT)
Dept: HEART AND VASCULAR | Facility: CLINIC | Age: 69
End: 2024-02-26
Payer: MEDICARE

## 2024-02-26 VITALS
TEMPERATURE: 97.4 F | HEIGHT: 71 IN | BODY MASS INDEX: 33.6 KG/M2 | HEART RATE: 68 BPM | SYSTOLIC BLOOD PRESSURE: 140 MMHG | DIASTOLIC BLOOD PRESSURE: 75 MMHG | WEIGHT: 240 LBS

## 2024-02-26 PROCEDURE — 93280 PM DEVICE PROGR EVAL DUAL: CPT
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
## 2024-02-29 NOTE — DISCUSSION/SUMMARY
[FreeTextEntry1] : 68 year-old male with a history of mixed cardiomyopathy (now recovered) along with extensive scarring (including transmural scar of the inferior wall) and syncope. He is s/p EPS 5/25/22 significant for significant infrahisian disease and NSVT. He is s/p dual chamber ICD placement 5/25/22. The device is functioning appropriately as programmed and all measured data is WNL. No arrhythmia events for review. The patient is enrolled in remote monitoring and was asked to return for follow-up in six months. Advised to call with any questions or concerns in the interim.

## 2024-02-29 NOTE — ADDENDUM
[FreeTextEntry1] :  I, Agustina Daugherty, am scribing for and the presence of Dr. German Huston the following sections: HPI, PMH,Family/social history, ROS, Physical Exam, Assessment / Plan.   I, German Huston hereby attest that the medical record entry for this patient accurately reflects signatures/notations that I made on the Date of Service in my capacity as an Attending Physician when I treated/diagnosed the above patient. I do hereby attest that this information is true, accurate and complete to the best of my knowledge.  I was present for the entire visit and supervised the entire visit including assessing the patient, conducting exam and establishing the plan of care.  I personally performed the evaluation and management services and agree with the plan as outlined above.

## 2024-03-15 RX ORDER — SACUBITRIL AND VALSARTAN 97; 103 MG/1; MG/1
97-103 TABLET, FILM COATED ORAL TWICE DAILY
Qty: 180 | Refills: 3 | Status: ACTIVE | COMMUNITY
Start: 2022-12-28 | End: 1900-01-01

## 2024-03-19 ENCOUNTER — APPOINTMENT (OUTPATIENT)
Dept: HEART AND VASCULAR | Facility: CLINIC | Age: 69
End: 2024-03-19

## 2024-03-22 ENCOUNTER — RX RENEWAL (OUTPATIENT)
Age: 69
End: 2024-03-22

## 2024-03-22 RX ORDER — EZETIMIBE 10 MG/1
10 TABLET ORAL
Qty: 90 | Refills: 3 | Status: ACTIVE | COMMUNITY
Start: 2020-02-28 | End: 1900-01-01

## 2024-04-03 ENCOUNTER — NON-APPOINTMENT (OUTPATIENT)
Age: 69
End: 2024-04-03

## 2024-04-03 ENCOUNTER — APPOINTMENT (OUTPATIENT)
Dept: HEART AND VASCULAR | Facility: CLINIC | Age: 69
End: 2024-04-03
Payer: MEDICARE

## 2024-04-03 VITALS
HEIGHT: 71 IN | BODY MASS INDEX: 32.9 KG/M2 | HEART RATE: 84 BPM | OXYGEN SATURATION: 95 % | WEIGHT: 235 LBS | DIASTOLIC BLOOD PRESSURE: 78 MMHG | TEMPERATURE: 98 F | SYSTOLIC BLOOD PRESSURE: 136 MMHG

## 2024-04-03 PROCEDURE — G2211 COMPLEX E/M VISIT ADD ON: CPT

## 2024-04-03 PROCEDURE — 36415 COLL VENOUS BLD VENIPUNCTURE: CPT

## 2024-04-03 PROCEDURE — 93000 ELECTROCARDIOGRAM COMPLETE: CPT

## 2024-04-03 PROCEDURE — 99214 OFFICE O/P EST MOD 30 MIN: CPT

## 2024-04-03 NOTE — PROCEDURE
[Size: ______] : The left ventricular size is [unfilled] [Wall Motion: ______] : The left ventricular wall motion is [unfilled] [Ejection Fraction: ______] : The left ventricular ejection fraction is [unfilled] [Normal] : 3. No pericardial effusion. [de-identified] : Dr. Kvng Farfan (card); Dr. Brianna Tsai (PCP) [de-identified] : Nicole Petri-Schoener, JANETCS [de-identified] : shortness of breath [de-identified] : 1.1 [de-identified] : 1.2 [de-identified] : 5.3 [de-identified] : 3.7 [de-identified] : 3.9 [de-identified] : 4.5 [de-identified] : inferoseptal and apical inferior hypokinesis as well as the apical segments [de-identified] : 40-97 [de-identified] : Mild concentric left ventricular hypertrophy. There is grade II diastolic dysfunction. [de-identified] : The left atrium is normal in size. The left atrial volume index is ml/m2. [de-identified] : The aortic valve is trileaflet.  The leaflets have normal excursion.  There is no significant aortic stenosis.  There is mild aortic regurgitation. [de-identified] : There is trace pulmonic insufficiency. [de-identified] : There is minimal tricuspid regurgitation. [de-identified] : The inferior vena cava measures 1.6 cm. [de-identified] : Mildly reduced left ventricular LVEF 40-45% there is inferoseptal and apical inferior hypokinesis as well as the apical segments [de-identified] : The aortic root is dilated at 3.9 cm.  The ascending aorta and aortic are normal in size.  The pulmonary artery appears normal in size. [FreeTextEntry1] : : 1955\par  Age: 65y\par  Sex: M \par  BP: 146/80\par  Height: 173 cm\par  Weight: 112 kg\par  BSA: 2.2 m2\par   [de-identified] : Dilated aortic root 3.9 cm

## 2024-04-03 NOTE — PHYSICAL EXAM
[Well Nourished] : well nourished [Well Developed] : well developed [No Acute Distress] : no acute distress [Normal Venous Pressure] : normal venous pressure [No Carotid Bruit] : no carotid bruit [No Murmur] : no murmur [Normal S1, S2] : normal S1, S2 [No Gallop] : no gallop [No Rub] : no rub [Good Air Entry] : good air entry [Clear Lung Fields] : clear lung fields [No Respiratory Distress] : no respiratory distress  [Soft] : abdomen soft [No Masses/organomegaly] : no masses/organomegaly [Non Tender] : non-tender [Normal Bowel Sounds] : normal bowel sounds [Normal Gait] : normal gait [No Edema] : no edema [No Cyanosis] : no cyanosis [No Clubbing] : no clubbing [No Rash] : no rash [No Varicosities] : no varicosities [Moves all extremities] : moves all extremities [No Skin Lesions] : no skin lesions [No Focal Deficits] : no focal deficits [Normal Speech] : normal speech [Alert and Oriented] : alert and oriented [Normal memory] : normal memory [General Appearance - Well Developed] : well developed [Well Groomed] : well groomed [Normal Appearance] : normal appearance [General Appearance - Well Nourished] : well nourished [General Appearance - In No Acute Distress] : no acute distress [No Deformities] : no deformities [Normal Conjunctiva] : the conjunctiva exhibited no abnormalities [Eyelids - No Xanthelasma] : the eyelids demonstrated no xanthelasmas [Normal Oral Mucosa] : normal oral mucosa [No Oral Pallor] : no oral pallor [Normal Jugular Venous A Waves Present] : normal jugular venous A waves present [No Oral Cyanosis] : no oral cyanosis [Normal Jugular Venous V Waves Present] : normal jugular venous V waves present [No Jugular Venous Poon A Waves] : no jugular venous poon A waves [Exaggerated Use Of Accessory Muscles For Inspiration] : no accessory muscle use [Auscultation Breath Sounds / Voice Sounds] : lungs were clear to auscultation bilaterally [Respiration, Rhythm And Depth] : normal respiratory rhythm and effort [Heart Rate And Rhythm] : heart rate and rhythm were normal [Heart Sounds] : normal S1 and S2 [Murmurs] : no murmurs present [Abdomen Soft] : soft [Abdomen Tenderness] : non-tender [Abdomen Mass (___ Cm)] : no abdominal mass palpated [Gait - Sufficient For Exercise Testing] : the gait was sufficient for exercise testing [Abnormal Walk] : normal gait [Nail Clubbing] : no clubbing of the fingernails [Cyanosis, Localized] : no localized cyanosis [Petechial Hemorrhages (___cm)] : no petechial hemorrhages [] : no rash [Skin Color & Pigmentation] : normal skin color and pigmentation [No Venous Stasis] : no venous stasis [No Skin Ulcers] : no skin ulcer [Skin Lesions] : no skin lesions [No Xanthoma] : no  xanthoma was observed [Oriented To Time, Place, And Person] : oriented to person, place, and time [Affect] : the affect was normal [Mood] : the mood was normal [No Anxiety] : not feeling anxious [FreeTextEntry1] : elevated JVD

## 2024-04-03 NOTE — HISTORY OF PRESENT ILLNESS
[FreeTextEntry1] : 69 M with DM HTN HPL CAD s/p PCI d RCA m LAD/D3 bifurcation in 2009 m LCX 2/2020 PAD s/p PTA of LSFA in 2015 Gout syncope ischemic CM EF 37% transmural scar as well as a non ischemic pattern on cardiac MRI h/o syncope in the past holter in May with NSVT  s/p AICD complicated by UE DVT [facility] Emphysema atypica chest pain (xyphoid deformity)  feels well has no cp no sob has    ecg nsr nsst changes  4/3/24 Echo 9/2022 EF 45%  Nuclear stress test 9/21/22 small sized fixed inferior and apical defect with minimal revisability  chest CT -xyphoid process deformity, otherwise negative 11/22/23

## 2024-04-03 NOTE — ASSESSMENT
[FreeTextEntry1] : - LV dysfunction- euvolemic on entresto 97/101 farxiga 10 mg daily -labs today -BNP and BMP - CAD on atorvastatin 80 mg daily/zetia 10 an asa 81 mg daily - BP still elevated would increase carvedilol 25 mg bid  - on aldactone 25 mg daily - fu in three months

## 2024-04-04 LAB
ALBUMIN SERPL ELPH-MCNC: 4.3 G/DL
ALP BLD-CCNC: 70 U/L
ALT SERPL-CCNC: 24 U/L
ANION GAP SERPL CALC-SCNC: 16 MMOL/L
AST SERPL-CCNC: 23 U/L
BILIRUB SERPL-MCNC: 0.7 MG/DL
BUN SERPL-MCNC: 11 MG/DL
CALCIUM SERPL-MCNC: 9.9 MG/DL
CHLORIDE SERPL-SCNC: 99 MMOL/L
CHOLEST SERPL-MCNC: 145 MG/DL
CO2 SERPL-SCNC: 22 MMOL/L
CREAT SERPL-MCNC: 1.06 MG/DL
CREAT SPEC-SCNC: 97 MG/DL
EGFR: 75 ML/MIN/1.73M2
ESTIMATED AVERAGE GLUCOSE: 137 MG/DL
GLUCOSE SERPL-MCNC: 97 MG/DL
HBA1C MFR BLD HPLC: 6.4 %
HCT VFR BLD CALC: 45 %
HDLC SERPL-MCNC: 41 MG/DL
HGB BLD-MCNC: 15.5 G/DL
LDLC SERPL CALC-MCNC: 83 MG/DL
MCHC RBC-ENTMCNC: 31.6 PG
MCHC RBC-ENTMCNC: 34.4 GM/DL
MCV RBC AUTO: 91.8 FL
MICROALBUMIN 24H UR DL<=1MG/L-MCNC: <1.2 MG/DL
MICROALBUMIN/CREAT 24H UR-RTO: NORMAL MG/G
NONHDLC SERPL-MCNC: 104 MG/DL
NT-PROBNP SERPL-MCNC: 166 PG/ML
PLATELET # BLD AUTO: 374 K/UL
POTASSIUM SERPL-SCNC: 5.1 MMOL/L
PROT SERPL-MCNC: 7.2 G/DL
RBC # BLD: 4.9 M/UL
RBC # FLD: 15.5 %
SODIUM SERPL-SCNC: 138 MMOL/L
TRIGL SERPL-MCNC: 119 MG/DL
WBC # FLD AUTO: 5.55 K/UL

## 2024-04-18 ENCOUNTER — APPOINTMENT (OUTPATIENT)
Dept: HEART AND VASCULAR | Facility: CLINIC | Age: 69
End: 2024-04-18

## 2024-04-22 ENCOUNTER — APPOINTMENT (OUTPATIENT)
Dept: HEART AND VASCULAR | Facility: CLINIC | Age: 69
End: 2024-04-22
Payer: MEDICARE

## 2024-04-22 ENCOUNTER — NON-APPOINTMENT (OUTPATIENT)
Age: 69
End: 2024-04-22

## 2024-04-22 VITALS
DIASTOLIC BLOOD PRESSURE: 68 MMHG | BODY MASS INDEX: 32.9 KG/M2 | SYSTOLIC BLOOD PRESSURE: 103 MMHG | HEART RATE: 77 BPM | TEMPERATURE: 97.5 F | WEIGHT: 235 LBS | HEIGHT: 71 IN | OXYGEN SATURATION: 95 %

## 2024-04-22 PROCEDURE — 99214 OFFICE O/P EST MOD 30 MIN: CPT

## 2024-04-22 RX ORDER — SILDENAFIL 100 MG/1
100 TABLET, FILM COATED ORAL
Qty: 6 | Refills: 11 | Status: DISCONTINUED | COMMUNITY
Start: 2021-07-27 | End: 2024-04-22

## 2024-04-22 RX ORDER — SPIRONOLACTONE 25 MG/1
25 TABLET ORAL
Qty: 90 | Refills: 3 | Status: DISCONTINUED | COMMUNITY
Start: 2023-05-31 | End: 2024-04-22

## 2024-04-24 NOTE — HISTORY OF PRESENT ILLNESS
[FreeTextEntry1] : Mr. Sigala is a pleasant 68 year-old gentleman with type II DM, hypertensin, hyperlipidemia, CAD s/p PCI, syncope and mixed cardiomyopathy (EF 40% with transmural scar on CMR, EF 50-55% on most recent ECHO 8/2020. He was originally referred following an episode of syncope that occurred while he was driving. He notes no prodrome and cannot remember any symptoms that led up to the event. He did not sustain any significant trauma and denies any postictal period. He had a recurrent syncopal event in early 2020. He was standing in the kitchen making coffee when he started to feel dizzy; braced himself on the counter but had LOC. Woke up on the kitchen floor; no confusion or incontinence. He presents for follow-up. He does not have any complaints since his last visit.

## 2024-04-24 NOTE — DISCUSSION/SUMMARY
[FreeTextEntry1] : 69 y/o male with a history of mixed cardiomyopathy (now recovered) along with extensive scarring (including transmural scar of the inferior wall) and syncope. He is s/p EPS 5/25/22 significant for significant infrahisian disease and NSVT. He presents for follow-up.   1) Syncope  - He has not had any recurrent episodes of syncope. He is feeling well without complaints.   2) Dual-chamber ICD placement 5/25/22 - Patient's pacemaker checked. Functioning normally. SEE PACEART.  - The patient is enrolled in remote monitoring and was asked to return for follow-up in six months. Advised to call with any questions or concerns in the interim.  Patient to follow-up in 6-9 months.

## 2024-04-26 ENCOUNTER — APPOINTMENT (OUTPATIENT)
Dept: HEART AND VASCULAR | Facility: CLINIC | Age: 69
End: 2024-04-26
Payer: MEDICARE

## 2024-04-26 PROCEDURE — 93306 TTE W/DOPPLER COMPLETE: CPT

## 2024-05-22 ENCOUNTER — APPOINTMENT (OUTPATIENT)
Dept: INTERNAL MEDICINE | Facility: CLINIC | Age: 69
End: 2024-05-22
Payer: MEDICARE

## 2024-05-22 VITALS
OXYGEN SATURATION: 97 % | HEART RATE: 71 BPM | SYSTOLIC BLOOD PRESSURE: 126 MMHG | DIASTOLIC BLOOD PRESSURE: 80 MMHG | WEIGHT: 240 LBS | BODY MASS INDEX: 33.6 KG/M2 | TEMPERATURE: 98 F | HEIGHT: 71 IN

## 2024-05-22 DIAGNOSIS — R41.3 OTHER AMNESIA: ICD-10-CM

## 2024-05-22 DIAGNOSIS — I42.9 CARDIOMYOPATHY, UNSPECIFIED: ICD-10-CM

## 2024-05-22 DIAGNOSIS — E78.5 HYPERLIPIDEMIA, UNSPECIFIED: ICD-10-CM

## 2024-05-22 DIAGNOSIS — E11.9 TYPE 2 DIABETES MELLITUS W/OUT COMPLICATIONS: ICD-10-CM

## 2024-05-22 PROCEDURE — G2211 COMPLEX E/M VISIT ADD ON: CPT

## 2024-05-22 PROCEDURE — 99214 OFFICE O/P EST MOD 30 MIN: CPT

## 2024-05-30 RX ORDER — EVOLOCUMAB 140 MG/ML
140 INJECTION, SOLUTION SUBCUTANEOUS
Qty: 3 | Refills: 5 | Status: ACTIVE | COMMUNITY
Start: 2024-04-04 | End: 1900-01-01

## 2024-06-05 LAB
CHOLEST SERPL-MCNC: 126 MG/DL
ESTIMATED AVERAGE GLUCOSE: 131 MG/DL
HBA1C MFR BLD HPLC: 6.2 %
HDLC SERPL-MCNC: 38 MG/DL
LDLC SERPL CALC-MCNC: 66 MG/DL
NONHDLC SERPL-MCNC: 88 MG/DL
NT-PROBNP SERPL-MCNC: 101 PG/ML
TRIGL SERPL-MCNC: 111 MG/DL

## 2024-07-10 ENCOUNTER — APPOINTMENT (OUTPATIENT)
Dept: HEART AND VASCULAR | Facility: CLINIC | Age: 69
End: 2024-07-10
Payer: MEDICARE

## 2024-07-10 ENCOUNTER — NON-APPOINTMENT (OUTPATIENT)
Age: 69
End: 2024-07-10

## 2024-07-10 VITALS
WEIGHT: 245 LBS | HEIGHT: 71 IN | HEART RATE: 92 BPM | DIASTOLIC BLOOD PRESSURE: 82 MMHG | BODY MASS INDEX: 34.3 KG/M2 | SYSTOLIC BLOOD PRESSURE: 118 MMHG | TEMPERATURE: 98.3 F | OXYGEN SATURATION: 96 %

## 2024-07-10 DIAGNOSIS — I42.9 CARDIOMYOPATHY, UNSPECIFIED: ICD-10-CM

## 2024-07-10 DIAGNOSIS — R06.02 SHORTNESS OF BREATH: ICD-10-CM

## 2024-07-10 PROCEDURE — 93000 ELECTROCARDIOGRAM COMPLETE: CPT

## 2024-07-10 PROCEDURE — G2211 COMPLEX E/M VISIT ADD ON: CPT

## 2024-07-10 PROCEDURE — 36415 COLL VENOUS BLD VENIPUNCTURE: CPT

## 2024-07-10 PROCEDURE — 99214 OFFICE O/P EST MOD 30 MIN: CPT

## 2024-07-11 LAB
ALBUMIN SERPL ELPH-MCNC: 4.4 G/DL
ALP BLD-CCNC: 61 U/L
ALT SERPL-CCNC: 18 U/L
ANION GAP SERPL CALC-SCNC: 15 MMOL/L
AST SERPL-CCNC: 18 U/L
BILIRUB SERPL-MCNC: 0.8 MG/DL
BUN SERPL-MCNC: 12 MG/DL
CALCIUM SERPL-MCNC: 9.8 MG/DL
CHLORIDE SERPL-SCNC: 106 MMOL/L
CHOLEST SERPL-MCNC: 145 MG/DL
CO2 SERPL-SCNC: 21 MMOL/L
CREAT SERPL-MCNC: 1.02 MG/DL
CREAT SPEC-SCNC: 76 MG/DL
EGFR: 79 ML/MIN/1.73M2
ESTIMATED AVERAGE GLUCOSE: 131 MG/DL
GLUCOSE SERPL-MCNC: 105 MG/DL
HBA1C MFR BLD HPLC: 6.2 %
HDLC SERPL-MCNC: 47 MG/DL
LDLC SERPL CALC-MCNC: 82 MG/DL
MICROALBUMIN 24H UR DL<=1MG/L-MCNC: <1.2 MG/DL
MICROALBUMIN/CREAT 24H UR-RTO: NORMAL MG/G
NONHDLC SERPL-MCNC: 98 MG/DL
NT-PROBNP SERPL-MCNC: 341 PG/ML
POTASSIUM SERPL-SCNC: 4.7 MMOL/L
PROT SERPL-MCNC: 7 G/DL
SODIUM SERPL-SCNC: 141 MMOL/L

## 2024-07-19 ENCOUNTER — APPOINTMENT (OUTPATIENT)
Dept: ORTHOPEDIC SURGERY | Facility: CLINIC | Age: 69
End: 2024-07-19

## 2024-07-22 ENCOUNTER — APPOINTMENT (OUTPATIENT)
Dept: HEART AND VASCULAR | Facility: CLINIC | Age: 69
End: 2024-07-22
Payer: MEDICARE

## 2024-07-22 ENCOUNTER — NON-APPOINTMENT (OUTPATIENT)
Age: 69
End: 2024-07-22

## 2024-07-22 PROCEDURE — 93295 DEV INTERROG REMOTE 1/2/MLT: CPT

## 2024-07-22 PROCEDURE — 93296 REM INTERROG EVL PM/IDS: CPT

## 2024-07-25 ENCOUNTER — APPOINTMENT (OUTPATIENT)
Dept: HEART AND VASCULAR | Facility: CLINIC | Age: 69
End: 2024-07-25
Payer: MEDICARE

## 2024-07-25 PROCEDURE — 78452 HT MUSCLE IMAGE SPECT MULT: CPT

## 2024-07-25 PROCEDURE — 93015 CV STRESS TEST SUPVJ I&R: CPT

## 2024-07-25 PROCEDURE — A9500: CPT

## 2024-09-19 ENCOUNTER — APPOINTMENT (OUTPATIENT)
Dept: HEART AND VASCULAR | Facility: CLINIC | Age: 69
End: 2024-09-19

## 2024-09-20 ENCOUNTER — APPOINTMENT (OUTPATIENT)
Dept: HEART AND VASCULAR | Facility: CLINIC | Age: 69
End: 2024-09-20

## 2024-09-23 ENCOUNTER — RX RENEWAL (OUTPATIENT)
Age: 69
End: 2024-09-23

## 2024-09-23 ENCOUNTER — APPOINTMENT (OUTPATIENT)
Dept: HEART AND VASCULAR | Facility: CLINIC | Age: 69
End: 2024-09-23
Payer: MEDICARE

## 2024-09-23 ENCOUNTER — NON-APPOINTMENT (OUTPATIENT)
Age: 69
End: 2024-09-23

## 2024-09-23 VITALS
WEIGHT: 243 LBS | OXYGEN SATURATION: 97 % | DIASTOLIC BLOOD PRESSURE: 74 MMHG | TEMPERATURE: 98.6 F | HEIGHT: 71 IN | BODY MASS INDEX: 34.02 KG/M2 | HEART RATE: 78 BPM | SYSTOLIC BLOOD PRESSURE: 116 MMHG

## 2024-09-23 DIAGNOSIS — R07.89 OTHER CHEST PAIN: ICD-10-CM

## 2024-09-23 DIAGNOSIS — I51.9 HEART DISEASE, UNSPECIFIED: ICD-10-CM

## 2024-09-23 PROCEDURE — 93000 ELECTROCARDIOGRAM COMPLETE: CPT

## 2024-09-23 PROCEDURE — 99214 OFFICE O/P EST MOD 30 MIN: CPT

## 2024-09-25 ENCOUNTER — APPOINTMENT (OUTPATIENT)
Dept: INTERNAL MEDICINE | Facility: CLINIC | Age: 69
End: 2024-09-25
Payer: MEDICARE

## 2024-09-25 VITALS
DIASTOLIC BLOOD PRESSURE: 77 MMHG | BODY MASS INDEX: 33.6 KG/M2 | WEIGHT: 240 LBS | HEART RATE: 97 BPM | SYSTOLIC BLOOD PRESSURE: 126 MMHG | HEIGHT: 71 IN | TEMPERATURE: 97 F | OXYGEN SATURATION: 95 %

## 2024-09-25 DIAGNOSIS — Z00.00 ENCOUNTER FOR GENERAL ADULT MEDICAL EXAMINATION W/OUT ABNORMAL FINDINGS: ICD-10-CM

## 2024-09-25 PROCEDURE — G0438: CPT

## 2024-09-25 NOTE — PHYSICAL EXAM
[Well Developed] : well developed [Well Nourished] : well nourished [No Acute Distress] : no acute distress [Normal Venous Pressure] : normal venous pressure [No Carotid Bruit] : no carotid bruit [Normal S1, S2] : normal S1, S2 [No Murmur] : no murmur [No Rub] : no rub [No Gallop] : no gallop [Clear Lung Fields] : clear lung fields [Good Air Entry] : good air entry [No Respiratory Distress] : no respiratory distress  [Soft] : abdomen soft [Non Tender] : non-tender [No Masses/organomegaly] : no masses/organomegaly [Normal Bowel Sounds] : normal bowel sounds [Normal Gait] : normal gait [No Edema] : no edema [No Cyanosis] : no cyanosis [No Clubbing] : no clubbing [No Varicosities] : no varicosities [No Rash] : no rash [No Skin Lesions] : no skin lesions [Moves all extremities] : moves all extremities [No Focal Deficits] : no focal deficits [Normal Speech] : normal speech [Alert and Oriented] : alert and oriented [Normal memory] : normal memory [General Appearance - Well Developed] : well developed [Normal Appearance] : normal appearance [Well Groomed] : well groomed [General Appearance - Well Nourished] : well nourished [No Deformities] : no deformities [General Appearance - In No Acute Distress] : no acute distress [Normal Conjunctiva] : the conjunctiva exhibited no abnormalities [Eyelids - No Xanthelasma] : the eyelids demonstrated no xanthelasmas [Normal Oral Mucosa] : normal oral mucosa [No Oral Pallor] : no oral pallor [No Oral Cyanosis] : no oral cyanosis [Normal Jugular Venous A Waves Present] : normal jugular venous A waves present [Normal Jugular Venous V Waves Present] : normal jugular venous V waves present [No Jugular Venous Poon A Waves] : no jugular venous poon A waves [Respiration, Rhythm And Depth] : normal respiratory rhythm and effort [Exaggerated Use Of Accessory Muscles For Inspiration] : no accessory muscle use [Auscultation Breath Sounds / Voice Sounds] : lungs were clear to auscultation bilaterally [Heart Rate And Rhythm] : heart rate and rhythm were normal [Heart Sounds] : normal S1 and S2 [Murmurs] : no murmurs present [Abdomen Soft] : soft [Abdomen Tenderness] : non-tender [Abdomen Mass (___ Cm)] : no abdominal mass palpated [Gait - Sufficient For Exercise Testing] : the gait was sufficient for exercise testing [Abnormal Walk] : normal gait [Nail Clubbing] : no clubbing of the fingernails [Petechial Hemorrhages (___cm)] : no petechial hemorrhages [Cyanosis, Localized] : no localized cyanosis [Skin Color & Pigmentation] : normal skin color and pigmentation [] : no rash [No Venous Stasis] : no venous stasis [No Skin Ulcers] : no skin ulcer [Skin Lesions] : no skin lesions [No Xanthoma] : no  xanthoma was observed [Oriented To Time, Place, And Person] : oriented to person, place, and time [Affect] : the affect was normal [Mood] : the mood was normal [No Anxiety] : not feeling anxious [FreeTextEntry1] : elevated JVD

## 2024-09-25 NOTE — ASSESSMENT
[FreeTextEntry1] : -optimized from a cardiology perspective for L knee replacement -high risk pt -see below rcri   Revised cardiac risk index: 2 for CAD, CHF (of note -does have dm -well controlled, not on insulin) - LV dysfunction- euvolemic on entresto 97/101 farxiga 10 mg daily coreg 12.5 mg bid - CAD on atorvastatin 80 mg daily/zetia 10 an asa 81 mg daily - on aldactone 25 mg daily - fu scheduled with Dr Farfan- will do PYP scan

## 2024-09-25 NOTE — PROCEDURE
[Size: ______] : The left ventricular size is [unfilled] [Wall Motion: ______] : The left ventricular wall motion is [unfilled] [Ejection Fraction: ______] : The left ventricular ejection fraction is [unfilled] [Normal] : 3. No pericardial effusion. [de-identified] : Dr. Kvng Farfan (card); Dr. Brianna Tsai (PCP) [de-identified] : Nicole Petri-Schoener, JANETCS [de-identified] : shortness of breath [de-identified] : 1.2 [de-identified] : 1.1 [de-identified] : 5.3 [de-identified] : 3.7 [de-identified] : 3.9 [de-identified] : 4.5 [de-identified] : 40-02 [de-identified] : inferoseptal and apical inferior hypokinesis as well as the apical segments [de-identified] : Mild concentric left ventricular hypertrophy. There is grade II diastolic dysfunction. [de-identified] : The left atrium is normal in size. The left atrial volume index is ml/m2. [de-identified] : The aortic valve is trileaflet.  The leaflets have normal excursion.  There is no significant aortic stenosis.  There is mild aortic regurgitation. [de-identified] : There is trace pulmonic insufficiency. [de-identified] : There is minimal tricuspid regurgitation. [de-identified] : The inferior vena cava measures 1.6 cm. [de-identified] : The aortic root is dilated at 3.9 cm.  The ascending aorta and aortic are normal in size.  The pulmonary artery appears normal in size. [de-identified] : Mildly reduced left ventricular LVEF 40-45% there is inferoseptal and apical inferior hypokinesis as well as the apical segments [de-identified] : Dilated aortic root 3.9 cm  [FreeTextEntry1] : : 1955\par  Age: 65y\par  Sex: M \par  BP: 146/80\par  Height: 173 cm\par  Weight: 112 kg\par  BSA: 2.2 m2\par

## 2024-09-25 NOTE — PROCEDURE
[Size: ______] : The left ventricular size is [unfilled] [Wall Motion: ______] : The left ventricular wall motion is [unfilled] [Ejection Fraction: ______] : The left ventricular ejection fraction is [unfilled] [Normal] : 3. No pericardial effusion. [de-identified] : Dr. Kvng Farfan (card); Dr. Brianna Tsai (PCP) [de-identified] : Nicole Petri-Schoener, JANETCS [de-identified] : shortness of breath [de-identified] : 1.2 [de-identified] : 1.1 [de-identified] : 5.3 [de-identified] : 3.7 [de-identified] : 3.9 [de-identified] : 4.5 [de-identified] : 40-97 [de-identified] : inferoseptal and apical inferior hypokinesis as well as the apical segments [de-identified] : Mild concentric left ventricular hypertrophy. There is grade II diastolic dysfunction. [de-identified] : The left atrium is normal in size. The left atrial volume index is ml/m2. [de-identified] : The aortic valve is trileaflet.  The leaflets have normal excursion.  There is no significant aortic stenosis.  There is mild aortic regurgitation. [de-identified] : There is trace pulmonic insufficiency. [de-identified] : There is minimal tricuspid regurgitation. [de-identified] : The inferior vena cava measures 1.6 cm. [de-identified] : The aortic root is dilated at 3.9 cm.  The ascending aorta and aortic are normal in size.  The pulmonary artery appears normal in size. [de-identified] : Mildly reduced left ventricular LVEF 40-45% there is inferoseptal and apical inferior hypokinesis as well as the apical segments [de-identified] : Dilated aortic root 3.9 cm  [FreeTextEntry1] : : 1955\par  Age: 65y\par  Sex: M \par  BP: 146/80\par  Height: 173 cm\par  Weight: 112 kg\par  BSA: 2.2 m2\par

## 2024-09-25 NOTE — HISTORY OF PRESENT ILLNESS
Bedside report received from off going RN/tech: Magda BATEMAN, assumed care of patient.  POC discussed with patient. Call light within reach, all needs addressed at this time.       Fall risk interventions in place: Move the patient closer to the nurse's station, Patient's personal possessions are with in their safe reach, Place socks on patient, Place fall risk sign on patient's door, Give patient urinal if applicable, Keep floor surfaces clean and dry, and Accompanied to restroom (all applicable per [facility] Fall risk assessment)   Continuous monitoring: Cardiac Leads, Pulse Ox, or Blood Pressure  IVF/IV medications: Not Applicable   Oxygen: Room Air  Bedside sitter: Pt on L2k SI with 1:1 sitter Jose Rafael BATEMAN (name), Report given to sitter, checklist completed, and checklist completed, stop sign in doorway  Isolation: Not Applicable       [FreeTextEntry1] : 69 M with DM HTN HPL CAD s/p PCI d RCA m LAD/D3 bifurcation in 2009 m LCX 2/2020 PAD s/p PTA of LSFA in 2015 Gout syncope ischemic CM EF 37% transmural scar as well as a non ischemic pattern on cardiac MRI h/o syncope in the past holter in May with NSVT  s/p AICD complicated by UE DVT [facility] Emphysema atypica chest pain (xyphoid deformity)  having L TKR on 9/30/24 with Dr Gallardo on [address]. limited in exertional capacity d/t knee pain. he has difficulty with dizziness mid day. no presyncope. worse with standing. no room spinning. SOB is stable. he feels as it is improved since his last visit.   ecg nsr nsst changes low voltage 2 pvcs 9/23//24 Echo EF 45-40% 4/26/24 Nuclear stress test 7/25/24  large-sized, severe defect in the distal inferior, basal inferior and mid inferior walls that are severe fixed consistent with an infarction

## 2024-09-25 NOTE — HISTORY OF PRESENT ILLNESS
[FreeTextEntry1] : 69 M with DM HTN HPL CAD s/p PCI d RCA m LAD/D3 bifurcation in 2009 m LCX 2/2020 PAD s/p PTA of LSFA in 2015 Gout syncope ischemic CM EF 37% transmural scar as well as a non ischemic pattern on cardiac MRI h/o syncope in the past holter in May with NSVT  s/p AICD complicated by UE DVT [facility] Emphysema atypica chest pain (xyphoid deformity)  having L TKR on 9/30/24 with Dr Gallardo on [address]. limited in exertional capacity d/t knee pain. he has difficulty with dizziness mid day. no presyncope. worse with standing. no room spinning. SOB is stable. he feels as it is improved since his last visit.   ecg nsr nsst changes low voltage 2 pvcs 9/23//24 Echo EF 45-40% 4/26/24 Nuclear stress test 7/25/24  large-sized, severe defect in the distal inferior, basal inferior and mid inferior walls that are severe fixed consistent with an infarction

## 2024-10-01 LAB — HEMOCCULT STL QL IA: NEGATIVE

## 2024-10-07 ENCOUNTER — OUTPATIENT (OUTPATIENT)
Dept: OUTPATIENT SERVICES | Facility: HOSPITAL | Age: 69
LOS: 1 days | End: 2024-10-07
Payer: COMMERCIAL

## 2024-10-07 VITALS
WEIGHT: 240.08 LBS | DIASTOLIC BLOOD PRESSURE: 70 MMHG | TEMPERATURE: 98 F | RESPIRATION RATE: 18 BRPM | HEIGHT: 71 IN | HEART RATE: 73 BPM | OXYGEN SATURATION: 99 % | SYSTOLIC BLOOD PRESSURE: 116 MMHG

## 2024-10-07 DIAGNOSIS — M17.32 UNILATERAL POST-TRAUMATIC OSTEOARTHRITIS, LEFT KNEE: ICD-10-CM

## 2024-10-07 DIAGNOSIS — I10 ESSENTIAL (PRIMARY) HYPERTENSION: ICD-10-CM

## 2024-10-07 DIAGNOSIS — Z90.81 ACQUIRED ABSENCE OF SPLEEN: Chronic | ICD-10-CM

## 2024-10-07 DIAGNOSIS — Z95.5 PRESENCE OF CORONARY ANGIOPLASTY IMPLANT AND GRAFT: Chronic | ICD-10-CM

## 2024-10-07 DIAGNOSIS — Z95.810 PRESENCE OF AUTOMATIC (IMPLANTABLE) CARDIAC DEFIBRILLATOR: Chronic | ICD-10-CM

## 2024-10-07 DIAGNOSIS — E78.5 HYPERLIPIDEMIA, UNSPECIFIED: ICD-10-CM

## 2024-10-07 DIAGNOSIS — Z98.890 OTHER SPECIFIED POSTPROCEDURAL STATES: Chronic | ICD-10-CM

## 2024-10-07 DIAGNOSIS — Z01.818 ENCOUNTER FOR OTHER PREPROCEDURAL EXAMINATION: ICD-10-CM

## 2024-10-07 DIAGNOSIS — E11.9 TYPE 2 DIABETES MELLITUS WITHOUT COMPLICATIONS: ICD-10-CM

## 2024-10-07 DIAGNOSIS — I25.10 ATHEROSCLEROTIC HEART DISEASE OF NATIVE CORONARY ARTERY WITHOUT ANGINA PECTORIS: ICD-10-CM

## 2024-10-07 LAB
A1C WITH ESTIMATED AVERAGE GLUCOSE RESULT: 6.1 % — HIGH (ref 4–5.6)
ALBUMIN SERPL ELPH-MCNC: 3.6 G/DL — SIGNIFICANT CHANGE UP (ref 3.5–5)
ALP SERPL-CCNC: 56 U/L — SIGNIFICANT CHANGE UP (ref 40–120)
ALT FLD-CCNC: 28 U/L DA — SIGNIFICANT CHANGE UP (ref 10–60)
ANION GAP SERPL CALC-SCNC: 8 MMOL/L — SIGNIFICANT CHANGE UP (ref 5–17)
AST SERPL-CCNC: 15 U/L — SIGNIFICANT CHANGE UP (ref 10–40)
BILIRUB SERPL-MCNC: 1 MG/DL — SIGNIFICANT CHANGE UP (ref 0.2–1.2)
BLD GP AB SCN SERPL QL: SIGNIFICANT CHANGE UP
BUN SERPL-MCNC: 15 MG/DL — SIGNIFICANT CHANGE UP (ref 7–18)
CALCIUM SERPL-MCNC: 9.7 MG/DL — SIGNIFICANT CHANGE UP (ref 8.4–10.5)
CHLORIDE SERPL-SCNC: 106 MMOL/L — SIGNIFICANT CHANGE UP (ref 96–108)
CO2 SERPL-SCNC: 24 MMOL/L — SIGNIFICANT CHANGE UP (ref 22–31)
CREAT SERPL-MCNC: 1.15 MG/DL — SIGNIFICANT CHANGE UP (ref 0.5–1.3)
EGFR: 68 ML/MIN/1.73M2 — SIGNIFICANT CHANGE UP
ESTIMATED AVERAGE GLUCOSE: 128 MG/DL — HIGH (ref 68–114)
GLUCOSE SERPL-MCNC: 108 MG/DL — HIGH (ref 70–99)
HCT VFR BLD CALC: 42.2 % — SIGNIFICANT CHANGE UP (ref 39–50)
HGB BLD-MCNC: 14.9 G/DL — SIGNIFICANT CHANGE UP (ref 13–17)
MCHC RBC-ENTMCNC: 31.8 PG — SIGNIFICANT CHANGE UP (ref 27–34)
MCHC RBC-ENTMCNC: 35.3 GM/DL — SIGNIFICANT CHANGE UP (ref 32–36)
MCV RBC AUTO: 90.2 FL — SIGNIFICANT CHANGE UP (ref 80–100)
MRSA PCR RESULT.: SIGNIFICANT CHANGE UP
NRBC # BLD: 0 /100 WBCS — SIGNIFICANT CHANGE UP (ref 0–0)
PLATELET # BLD AUTO: 292 K/UL — SIGNIFICANT CHANGE UP (ref 150–400)
POTASSIUM SERPL-MCNC: 4.2 MMOL/L — SIGNIFICANT CHANGE UP (ref 3.5–5.3)
POTASSIUM SERPL-SCNC: 4.2 MMOL/L — SIGNIFICANT CHANGE UP (ref 3.5–5.3)
PROT SERPL-MCNC: 7.2 G/DL — SIGNIFICANT CHANGE UP (ref 6–8.3)
RBC # BLD: 4.68 M/UL — SIGNIFICANT CHANGE UP (ref 4.2–5.8)
RBC # FLD: 15 % — HIGH (ref 10.3–14.5)
S AUREUS DNA NOSE QL NAA+PROBE: SIGNIFICANT CHANGE UP
SODIUM SERPL-SCNC: 138 MMOL/L — SIGNIFICANT CHANGE UP (ref 135–145)
WBC # BLD: 5.85 K/UL — SIGNIFICANT CHANGE UP (ref 3.8–10.5)
WBC # FLD AUTO: 5.85 K/UL — SIGNIFICANT CHANGE UP (ref 3.8–10.5)

## 2024-10-07 NOTE — H&P PST ADULT - PROBLEM SELECTOR PLAN 3
Instructed to continue antihypertensive med-Carvedilol  and take it with sips of water on day of surgery.   Follow-up with PCP/cardiology for management.

## 2024-10-07 NOTE — H&P PST ADULT - CARDIOVASCULAR COMMENTS
CAD-s/p PCI X2, ischemic cardiomyopathy-s/p Medtronic ICD [id number] [place] XT  MRI-last interrogated on 7/22/24, HTN, HLD palpable device in left pectoral area

## 2024-10-07 NOTE — H&P PST ADULT - PROBLEM SELECTOR PLAN 1
Pt is scheduled for left total knee arthroplasty on 10/14/24.   BARBARA stop bang score 4. Pt denies history of BARBARA, never did sleep study.  Preop instructions discussed with pt and copy given to pt.   Instructed pt to notify security when he arrives in the lobby of the hospital that he is here for surgery, not to eat or drink anything after midnight the night before the surgery, to avoid NSAIDs such as Ibuprofen, Motrin, aleve, Advil, naproxen before surgery, to take Tylenol if needed for pain, to report if he has been exposed to any one with any contagious diseases including Covid-19 or if he is exhibiting any symptoms of COVID-19.  Instructed about use of Chlorhexidine 4% soap for 3 days before surgery including the morning of surgery and Mupirocin nasal ointment if nasal swab is positive for MSSA/MRSA before surgery.

## 2024-10-07 NOTE — H&P PST ADULT - NSICDXPASTSURGICALHX_GEN_ALL_CORE_FT
PAST SURGICAL HISTORY:  History of total hip replacement left hip, April 2015    Other acquired absence of organ S/P splenectomy     PAST SURGICAL HISTORY:  History of atherectomy     History of automatic internal cardiac defibrillator (AICD)     History of lumbar laminectomy     History of splenectomy     History of total hip replacement left hip, April 2015    Other acquired absence of organ S/P splenectomy    S/P coronary artery stent placement

## 2024-10-07 NOTE — H&P PST ADULT - PROBLEM SELECTOR PLAN 5
Pt on Metformin and Farxiga-HgA1C checked as per policy.  Instructed to continue antidiabetic meds and to hold Farxiga 3 days before surgery and Metformin the morning of surgery.   Perioperative glucose monitoring and coverage as needed.   Follow-up with PCP for diabetic management

## 2024-10-07 NOTE — H&P PST ADULT - ASSESSMENT
This is a 69 yr old male with PMH of CAD on aspirin-s/p PCI RCA and mLAD//D3 bifurcation in 2009, mLCX in 2/2020, CHF-ischemic cardiomyopathy-s/p Medtronic ICD dual chamber [id number] [place] XT DR MRI-implanted on 5/22/24-last interrogated on 7/22/24,PAD-s/p PTA of LSFA in 2015, Diabetes on Farxiga, HLD, HTN, thrombosis who  presents with post-traumatic osteoarthritis of left knee. Pt is scheduled for left total knee arthroplasty on 10/14/24.   BARBARA stop bang score 4. Pt denies history of BARBARA, never did sleep study.

## 2024-10-07 NOTE — H&P PST ADULT - NSICDXPASTMEDICALHX_GEN_ALL_CORE_FT
PAST MEDICAL HISTORY:  Atherosclerosis of coronary artery s/p stent in 2009 and angioplasty in 2010    Essential hypertension Hypertension    Gout Gout    Hyperlipidemia Hyperlipidemia    Osteoarthritis Osteoarthritis    Type 2 diabetes mellitus DM2 (diabetes mellitus, type 2)     PAST MEDICAL HISTORY:  Atherosclerosis of coronary artery s/p stent in 2009 and angioplasty in 2010    Essential hypertension Hypertension    Gout Gout    Hyperlipidemia Hyperlipidemia    Osteoarthritis Osteoarthritis    Post-traumatic osteoarthritis of left knee     Type 2 diabetes mellitus DM2 (diabetes mellitus, type 2)

## 2024-10-07 NOTE — H&P PST ADULT - PROBLEM SELECTOR PLAN 2
CAD on aspirin-s/p PCI RCA and mLAD//D3 bifurcation in 2009, mLCX in 2/2020, CHF-ischemic cardiomyopathy-s/p Medtronic ICD [id number] [place] XT DR MUELLER-last interrogated on 7/22/24.  Optimized for surgery by cardiology.  Pt instructed not to stop aspirin, to continue taking aspirin and to take aspirin and Entresto on the day of surgery.   Pt to follow-up with cardiologist for management.

## 2024-10-07 NOTE — H&P PST ADULT - HISTORY OF PRESENT ILLNESS
This is a 69 yr old male with PMH of CAD-s/p PCI , ischemic cardiomyopathy-s/p Medtronic ICD [id number] [place] XT  MRI-last interrogated on 7/22/24, Diabetes on Farxiga, HLD, HTN, PAD-s/p PTA of LSFA in 2015 who  presents with c/o knee pain due to osteoarthritis.Pt reports worsening of pain with prolonged ambulation and standing.Recent X-Ray revealed loss of cartilage. Pt is scheduled for left total knee arthroplasty on 10/14/24.  This is a 69 yr old male with PMH of CAD-s/p PCI RCA and mLAD//D3 bifurcation in 2009, mLCX in 2/2020, ischemic cardiomyopathy-s/p Medtronic ICD [id number] [place] XT DR MRI-implanted on 5/22/24-last interrogated on 7/22/24,PAD-s/p PTA of LSFA in 2015, Diabetes on Farxiga, HLD, HTN, thrombosis who  presents with c/o left knee pain due to osteoarthritis. Pt reports worsening of pain with prolonged ambulation and standing. Pt is scheduled for left total knee arthroplasty on 10/14/24.

## 2024-10-08 PROBLEM — M17.32 UNILATERAL POST-TRAUMATIC OSTEOARTHRITIS, LEFT KNEE: Chronic | Status: ACTIVE | Noted: 2024-10-07

## 2024-10-08 PROCEDURE — 86900 BLOOD TYPING SEROLOGIC ABO: CPT

## 2024-10-08 PROCEDURE — 85027 COMPLETE CBC AUTOMATED: CPT

## 2024-10-08 PROCEDURE — 36415 COLL VENOUS BLD VENIPUNCTURE: CPT

## 2024-10-08 PROCEDURE — 83036 HEMOGLOBIN GLYCOSYLATED A1C: CPT

## 2024-10-08 PROCEDURE — 80053 COMPREHEN METABOLIC PANEL: CPT

## 2024-10-08 PROCEDURE — 86850 RBC ANTIBODY SCREEN: CPT

## 2024-10-08 PROCEDURE — 86901 BLOOD TYPING SEROLOGIC RH(D): CPT

## 2024-10-08 PROCEDURE — G0463: CPT

## 2024-10-08 PROCEDURE — 87640 STAPH A DNA AMP PROBE: CPT

## 2024-10-08 PROCEDURE — 87641 MR-STAPH DNA AMP PROBE: CPT

## 2024-10-08 NOTE — CHART NOTE - NSCHARTNOTEFT_GEN_A_CORE
PRE-TJR SOCIAL/FUNCTIONAL SCREENING Via:      In Person Meet  on 10/7/2024:  Preferred Language:  English  Patient Telephone #:  [phone]  EMAIL ADDRESS:  [email or web address]  Insurance:  : [phone] (Daisha)   Pt stated Goal for Surgery : Walk 2-3 miles  SURGERY DETAILS:  Sx Date:  10/14/2024                                                                                           Surgery Type:  Right Knee Replacement  Surgeon: Dr. Gallardo   RAPT Tool:  10/12 (directly home)  Attitude towards SDD:  poor - lives alone and doesn't feel safe going home the same day    SOCIAL HISTORY:  Live With:   Alone in a Multifamily Home on the 2nd floor    Stairs Required to Access (Street to Front Door) Residence:  Yes; 15   Railing:  Left    Once Inside Pt Residence:   To Access a Bathroom:      No Stairs Required     To Access a Bedroom Where Pt. Can Sleep:  No Stairs Required   Pt. Currently Has Formal Home Health Services:  No      MOBILITY HISTORY:   Baseline Ambulation- Pt is Independent in ambulation with assistive device:    Yes;   Cane  Pt. Owns Any Other Medical Equipment?  No, patient will need rolling walker and 3-in-1 commode, advised to call MD office for follow up with DME  MEDICAL HISTORY:  Pt has any History of Back Surgery:  Yes; L4-5 Discectomy  Pt has any Allergies:  No     Patient denies diagnosis of sleep apnea or use of CPAP    Pt. Pharmacy Information:  Name:  [facility]                   Address:      [address] [place] [address]    Telephone #: [phone]    Pt. will Require Transportation to Home Upon Discharge: Yes; Patient stated that  will provide transport.   will be Made Aware:    For Post-Acute Care:  Pt. prefers [facility] for post-acute needs     Pt electronically sent pre- op education book "What to do before and after knee replacement".  All details of upcoming procedure discussed including, precautions, the throughput process, post-op process including transportation, home-care and follow-up as well as important information regarding blood clots, pneumonia, falls, infection and pain.  All questions answered and pt. verbalized understanding.  Pt. has my phone number for follow up as needed.

## 2024-10-09 RX ORDER — SODIUM CHLORIDE 0.9 % (FLUSH) 0.9 %
3 SYRINGE (ML) INJECTION EVERY 8 HOURS
Refills: 0 | Status: DISCONTINUED | OUTPATIENT
Start: 2024-10-14 | End: 2024-10-14

## 2024-10-14 ENCOUNTER — INPATIENT (INPATIENT)
Facility: HOSPITAL | Age: 69
LOS: 0 days | Discharge: HOME HEALTH | DRG: 554 | End: 2024-10-14
Attending: ORTHOPAEDIC SURGERY | Admitting: ORTHOPAEDIC SURGERY
Payer: COMMERCIAL

## 2024-10-14 ENCOUNTER — TRANSCRIPTION ENCOUNTER (OUTPATIENT)
Age: 69
End: 2024-10-14

## 2024-10-14 VITALS
HEIGHT: 71 IN | RESPIRATION RATE: 18 BRPM | OXYGEN SATURATION: 97 % | WEIGHT: 240.08 LBS | SYSTOLIC BLOOD PRESSURE: 151 MMHG | TEMPERATURE: 98 F | HEART RATE: 86 BPM | DIASTOLIC BLOOD PRESSURE: 96 MMHG

## 2024-10-14 VITALS
TEMPERATURE: 98 F | HEART RATE: 79 BPM | OXYGEN SATURATION: 96 % | DIASTOLIC BLOOD PRESSURE: 72 MMHG | RESPIRATION RATE: 18 BRPM | SYSTOLIC BLOOD PRESSURE: 115 MMHG

## 2024-10-14 DIAGNOSIS — G89.18 OTHER ACUTE POSTPROCEDURAL PAIN: ICD-10-CM

## 2024-10-14 DIAGNOSIS — E66.9 OBESITY, UNSPECIFIED: ICD-10-CM

## 2024-10-14 DIAGNOSIS — Z95.810 PRESENCE OF AUTOMATIC (IMPLANTABLE) CARDIAC DEFIBRILLATOR: Chronic | ICD-10-CM

## 2024-10-14 DIAGNOSIS — E78.5 HYPERLIPIDEMIA, UNSPECIFIED: ICD-10-CM

## 2024-10-14 DIAGNOSIS — Z01.818 ENCOUNTER FOR OTHER PREPROCEDURAL EXAMINATION: ICD-10-CM

## 2024-10-14 DIAGNOSIS — M17.32 UNILATERAL POST-TRAUMATIC OSTEOARTHRITIS, LEFT KNEE: ICD-10-CM

## 2024-10-14 DIAGNOSIS — Z90.81 ACQUIRED ABSENCE OF SPLEEN: Chronic | ICD-10-CM

## 2024-10-14 DIAGNOSIS — Z95.5 PRESENCE OF CORONARY ANGIOPLASTY IMPLANT AND GRAFT: Chronic | ICD-10-CM

## 2024-10-14 DIAGNOSIS — I10 ESSENTIAL (PRIMARY) HYPERTENSION: ICD-10-CM

## 2024-10-14 DIAGNOSIS — Z98.890 OTHER SPECIFIED POSTPROCEDURAL STATES: Chronic | ICD-10-CM

## 2024-10-14 DIAGNOSIS — Z96.652 PRESENCE OF LEFT ARTIFICIAL KNEE JOINT: ICD-10-CM

## 2024-10-14 LAB
ANION GAP SERPL CALC-SCNC: 6 MMOL/L — SIGNIFICANT CHANGE UP (ref 5–17)
BLD GP AB SCN SERPL QL: SIGNIFICANT CHANGE UP
BUN SERPL-MCNC: 11 MG/DL — SIGNIFICANT CHANGE UP (ref 7–18)
CALCIUM SERPL-MCNC: 8.9 MG/DL — SIGNIFICANT CHANGE UP (ref 8.4–10.5)
CHLORIDE SERPL-SCNC: 112 MMOL/L — HIGH (ref 96–108)
CO2 SERPL-SCNC: 24 MMOL/L — SIGNIFICANT CHANGE UP (ref 22–31)
CREAT SERPL-MCNC: 1.1 MG/DL — SIGNIFICANT CHANGE UP (ref 0.5–1.3)
EGFR: 72 ML/MIN/1.73M2 — SIGNIFICANT CHANGE UP
GLUCOSE BLDC GLUCOMTR-MCNC: 100 MG/DL — HIGH (ref 70–99)
GLUCOSE BLDC GLUCOMTR-MCNC: 108 MG/DL — HIGH (ref 70–99)
GLUCOSE BLDC GLUCOMTR-MCNC: 133 MG/DL — HIGH (ref 70–99)
GLUCOSE BLDC GLUCOMTR-MCNC: 97 MG/DL — SIGNIFICANT CHANGE UP (ref 70–99)
GLUCOSE SERPL-MCNC: 103 MG/DL — HIGH (ref 70–99)
HCT VFR BLD CALC: 39.2 % — SIGNIFICANT CHANGE UP (ref 39–50)
HGB BLD-MCNC: 13.8 G/DL — SIGNIFICANT CHANGE UP (ref 13–17)
MCHC RBC-ENTMCNC: 32.3 PG — SIGNIFICANT CHANGE UP (ref 27–34)
MCHC RBC-ENTMCNC: 35.2 GM/DL — SIGNIFICANT CHANGE UP (ref 32–36)
MCV RBC AUTO: 91.8 FL — SIGNIFICANT CHANGE UP (ref 80–100)
NRBC # BLD: 0 /100 WBCS — SIGNIFICANT CHANGE UP (ref 0–0)
PLATELET # BLD AUTO: 272 K/UL — SIGNIFICANT CHANGE UP (ref 150–400)
POTASSIUM SERPL-MCNC: 4.4 MMOL/L — SIGNIFICANT CHANGE UP (ref 3.5–5.3)
POTASSIUM SERPL-SCNC: 4.4 MMOL/L — SIGNIFICANT CHANGE UP (ref 3.5–5.3)
RBC # BLD: 4.27 M/UL — SIGNIFICANT CHANGE UP (ref 4.2–5.8)
RBC # FLD: 15.3 % — HIGH (ref 10.3–14.5)
SODIUM SERPL-SCNC: 142 MMOL/L — SIGNIFICANT CHANGE UP (ref 135–145)
WBC # BLD: 6.31 K/UL — SIGNIFICANT CHANGE UP (ref 3.8–10.5)
WBC # FLD AUTO: 6.31 K/UL — SIGNIFICANT CHANGE UP (ref 3.8–10.5)

## 2024-10-14 PROCEDURE — 80048 BASIC METABOLIC PNL TOTAL CA: CPT

## 2024-10-14 PROCEDURE — 97530 THERAPEUTIC ACTIVITIES: CPT

## 2024-10-14 PROCEDURE — 97161 PT EVAL LOW COMPLEX 20 MIN: CPT

## 2024-10-14 PROCEDURE — 36415 COLL VENOUS BLD VENIPUNCTURE: CPT

## 2024-10-14 PROCEDURE — 97116 GAIT TRAINING THERAPY: CPT

## 2024-10-14 PROCEDURE — 88311 DECALCIFY TISSUE: CPT

## 2024-10-14 PROCEDURE — 73560 X-RAY EXAM OF KNEE 1 OR 2: CPT | Mod: 26,LT

## 2024-10-14 PROCEDURE — 73560 X-RAY EXAM OF KNEE 1 OR 2: CPT

## 2024-10-14 PROCEDURE — 88304 TISSUE EXAM BY PATHOLOGIST: CPT | Mod: 26

## 2024-10-14 PROCEDURE — 88311 DECALCIFY TISSUE: CPT | Mod: 26

## 2024-10-14 PROCEDURE — 86901 BLOOD TYPING SEROLOGIC RH(D): CPT

## 2024-10-14 PROCEDURE — C1713: CPT

## 2024-10-14 PROCEDURE — 99222 1ST HOSP IP/OBS MODERATE 55: CPT

## 2024-10-14 PROCEDURE — 82962 GLUCOSE BLOOD TEST: CPT

## 2024-10-14 PROCEDURE — 85027 COMPLETE CBC AUTOMATED: CPT

## 2024-10-14 PROCEDURE — 97110 THERAPEUTIC EXERCISES: CPT

## 2024-10-14 PROCEDURE — 86850 RBC ANTIBODY SCREEN: CPT

## 2024-10-14 PROCEDURE — C1776: CPT

## 2024-10-14 PROCEDURE — 88304 TISSUE EXAM BY PATHOLOGIST: CPT

## 2024-10-14 PROCEDURE — 86900 BLOOD TYPING SEROLOGIC ABO: CPT

## 2024-10-14 DEVICE — INSERT TIB BEARING CS X3 SZ 6 9MM: Type: IMPLANTABLE DEVICE | Site: LEFT | Status: FUNCTIONAL

## 2024-10-14 DEVICE — IMP PATELLA ASYMMETRIC X3 35X10MM: Type: IMPLANTABLE DEVICE | Site: LEFT | Status: FUNCTIONAL

## 2024-10-14 DEVICE — BASEPLATE TIB UNIV TRIATHLON SZ 6: Type: IMPLANTABLE DEVICE | Site: LEFT | Status: FUNCTIONAL

## 2024-10-14 DEVICE — CEMENT SIMPLEX P 40GM: Type: IMPLANTABLE DEVICE | Site: LEFT | Status: FUNCTIONAL

## 2024-10-14 DEVICE — COMP FEM TRIATHLON CR SZ 6 LT: Type: IMPLANTABLE DEVICE | Site: LEFT | Status: FUNCTIONAL

## 2024-10-14 DEVICE — STRYKER PIN 1/8 X 3.5" LONG: Type: IMPLANTABLE DEVICE | Site: LEFT | Status: FUNCTIONAL

## 2024-10-14 RX ORDER — ENOXAPARIN SODIUM 150 MG/ML
30 INJECTION SUBCUTANEOUS EVERY 12 HOURS
Refills: 0 | Status: DISCONTINUED | OUTPATIENT
Start: 2024-10-14 | End: 2024-10-14

## 2024-10-14 RX ORDER — CELECOXIB 200 MG/1
200 CAPSULE ORAL ONCE
Refills: 0 | Status: COMPLETED | OUTPATIENT
Start: 2024-10-14 | End: 2024-10-14

## 2024-10-14 RX ORDER — SENNOSIDES 8.6 MG
2 TABLET ORAL AT BEDTIME
Refills: 0 | Status: DISCONTINUED | OUTPATIENT
Start: 2024-10-14 | End: 2024-10-14

## 2024-10-14 RX ORDER — ASPIRIN 325 MG
1 TABLET ORAL
Qty: 28 | Refills: 0
Start: 2024-10-14 | End: 2024-10-27

## 2024-10-14 RX ORDER — INSULIN LISPRO 100/ML
VIAL (ML) SUBCUTANEOUS
Refills: 0 | Status: DISCONTINUED | OUTPATIENT
Start: 2024-10-14 | End: 2024-10-14

## 2024-10-14 RX ORDER — ACETAMINOPHEN 325 MG
1000 TABLET ORAL EVERY 6 HOURS
Refills: 0 | Status: DISCONTINUED | OUTPATIENT
Start: 2024-10-14 | End: 2024-10-14

## 2024-10-14 RX ORDER — ALCOHOL ANTISEPTIC PADS
25 PADS, MEDICATED (EA) TOPICAL ONCE
Refills: 0 | Status: DISCONTINUED | OUTPATIENT
Start: 2024-10-14 | End: 2024-10-14

## 2024-10-14 RX ORDER — SENNOSIDES 8.6 MG
1 TABLET ORAL
Qty: 14 | Refills: 0
Start: 2024-10-14 | End: 2024-10-20

## 2024-10-14 RX ORDER — ACETAMINOPHEN 325 MG
2 TABLET ORAL
Refills: 0 | DISCHARGE

## 2024-10-14 RX ORDER — TRAMADOL HYDROCHLORIDE 50 MG/1
50 TABLET, COATED ORAL ONCE
Refills: 0 | Status: DISCONTINUED | OUTPATIENT
Start: 2024-10-14 | End: 2024-10-14

## 2024-10-14 RX ORDER — MAGNESIUM HYDROXIDE 400 MG/5ML
30 SUSPENSION, ORAL (FINAL DOSE FORM) ORAL DAILY
Refills: 0 | Status: DISCONTINUED | OUTPATIENT
Start: 2024-10-14 | End: 2024-10-14

## 2024-10-14 RX ORDER — GLUCAGON INJECTION, SOLUTION 0.5 MG/.1ML
1 INJECTION, SOLUTION SUBCUTANEOUS ONCE
Refills: 0 | Status: DISCONTINUED | OUTPATIENT
Start: 2024-10-14 | End: 2024-10-14

## 2024-10-14 RX ORDER — PANTOPRAZOLE SODIUM 40 MG/1
40 TABLET, DELAYED RELEASE ORAL
Refills: 0 | Status: DISCONTINUED | OUTPATIENT
Start: 2024-10-14 | End: 2024-10-14

## 2024-10-14 RX ORDER — ALCOHOL ANTISEPTIC PADS
12.5 PADS, MEDICATED (EA) TOPICAL ONCE
Refills: 0 | Status: DISCONTINUED | OUTPATIENT
Start: 2024-10-14 | End: 2024-10-14

## 2024-10-14 RX ORDER — KETOROLAC TROMETHAMINE 10 MG/1
15 TABLET, FILM COATED ORAL EVERY 6 HOURS
Refills: 0 | Status: DISCONTINUED | OUTPATIENT
Start: 2024-10-14 | End: 2024-10-14

## 2024-10-14 RX ORDER — ACETAMINOPHEN 325 MG
975 TABLET ORAL ONCE
Refills: 0 | Status: COMPLETED | OUTPATIENT
Start: 2024-10-14 | End: 2024-10-14

## 2024-10-14 RX ORDER — EZETIMIBE 10 MG/1
10 TABLET ORAL DAILY
Refills: 0 | Status: DISCONTINUED | OUTPATIENT
Start: 2024-10-14 | End: 2024-10-14

## 2024-10-14 RX ORDER — SPIRONOLACTONE 100 MG/1
25 TABLET, FILM COATED ORAL DAILY
Refills: 0 | Status: DISCONTINUED | OUTPATIENT
Start: 2024-10-14 | End: 2024-10-14

## 2024-10-14 RX ORDER — FENTANYL CITRATE-0.9 % NACL/PF 300MCG/30
25 PATIENT CONTROLLED ANALGESIA VIAL INJECTION
Refills: 0 | Status: DISCONTINUED | OUTPATIENT
Start: 2024-10-14 | End: 2024-10-14

## 2024-10-14 RX ORDER — CEFAZOLIN SODIUM 1 G
2000 VIAL (EA) INJECTION EVERY 8 HOURS
Refills: 0 | Status: COMPLETED | OUTPATIENT
Start: 2024-10-14 | End: 2024-10-15

## 2024-10-14 RX ORDER — DAPAGLIFLOZIN 10 MG/1
1 TABLET, FILM COATED ORAL
Refills: 0 | DISCHARGE

## 2024-10-14 RX ORDER — SACUBITRIL AND VALSARTAN 97; 103 MG/1; MG/1
1 TABLET, FILM COATED ORAL
Refills: 0 | DISCHARGE

## 2024-10-14 RX ORDER — SODIUM CHLORIDE IRRIG SOLUTION 0.9 %
1000 SOLUTION, IRRIGATION IRRIGATION
Refills: 0 | Status: DISCONTINUED | OUTPATIENT
Start: 2024-10-14 | End: 2024-10-14

## 2024-10-14 RX ORDER — SODIUM CHLORIDE 0.9 % (FLUSH) 0.9 %
1000 SYRINGE (ML) INJECTION
Refills: 0 | Status: DISCONTINUED | OUTPATIENT
Start: 2024-10-14 | End: 2024-10-14

## 2024-10-14 RX ORDER — ONDANSETRON HCL/PF 4 MG/2 ML
4 VIAL (ML) INJECTION EVERY 6 HOURS
Refills: 0 | Status: DISCONTINUED | OUTPATIENT
Start: 2024-10-14 | End: 2024-10-14

## 2024-10-14 RX ORDER — FERROUS SULFATE 325(65) MG
325 TABLET ORAL DAILY
Refills: 0 | Status: DISCONTINUED | OUTPATIENT
Start: 2024-10-14 | End: 2024-10-14

## 2024-10-14 RX ORDER — CARVEDILOL 3.125 MG
12.5 TABLET ORAL EVERY 12 HOURS
Refills: 0 | Status: DISCONTINUED | OUTPATIENT
Start: 2024-10-14 | End: 2024-10-14

## 2024-10-14 RX ORDER — TRAMADOL HYDROCHLORIDE 50 MG/1
25 TABLET, COATED ORAL EVERY 8 HOURS
Refills: 0 | Status: DISCONTINUED | OUTPATIENT
Start: 2024-10-14 | End: 2024-10-14

## 2024-10-14 RX ORDER — CELECOXIB 200 MG/1
200 CAPSULE ORAL DAILY
Refills: 0 | Status: DISCONTINUED | OUTPATIENT
Start: 2024-10-15 | End: 2024-10-14

## 2024-10-14 RX ORDER — OXYCODONE AND ACETAMINOPHEN 5; 325 MG/1; MG/1
1 TABLET ORAL
Qty: 28 | Refills: 0
Start: 2024-10-14 | End: 2024-10-20

## 2024-10-14 RX ORDER — ATORVASTATIN CALCIUM 10 MG/1
80 TABLET, FILM COATED ORAL AT BEDTIME
Refills: 0 | Status: DISCONTINUED | OUTPATIENT
Start: 2024-10-14 | End: 2024-10-14

## 2024-10-14 RX ORDER — ASPIRIN 325 MG
81 TABLET ORAL DAILY
Refills: 0 | Status: DISCONTINUED | OUTPATIENT
Start: 2024-10-14 | End: 2024-10-14

## 2024-10-14 RX ORDER — ASPIRIN 325 MG
1 TABLET ORAL
Refills: 0 | DISCHARGE

## 2024-10-14 RX ORDER — ALCOHOL ANTISEPTIC PADS
15 PADS, MEDICATED (EA) TOPICAL ONCE
Refills: 0 | Status: DISCONTINUED | OUTPATIENT
Start: 2024-10-14 | End: 2024-10-14

## 2024-10-14 RX ORDER — OXYCODONE HYDROCHLORIDE 30 MG/1
5 TABLET, FILM COATED, EXTENDED RELEASE ORAL EVERY 4 HOURS
Refills: 0 | Status: DISCONTINUED | OUTPATIENT
Start: 2024-10-14 | End: 2024-10-14

## 2024-10-14 RX ORDER — FENTANYL CITRATE-0.9 % NACL/PF 300MCG/30
50 PATIENT CONTROLLED ANALGESIA VIAL INJECTION
Refills: 0 | Status: DISCONTINUED | OUTPATIENT
Start: 2024-10-14 | End: 2024-10-14

## 2024-10-14 RX ORDER — SPIRONOLACTONE 100 MG/1
1 TABLET, FILM COATED ORAL
Refills: 0 | DISCHARGE

## 2024-10-14 RX ORDER — TRAMADOL HYDROCHLORIDE 50 MG/1
50 TABLET, COATED ORAL EVERY 8 HOURS
Refills: 0 | Status: DISCONTINUED | OUTPATIENT
Start: 2024-10-14 | End: 2024-10-14

## 2024-10-14 RX ORDER — SACUBITRIL AND VALSARTAN 97; 103 MG/1; MG/1
1 TABLET, FILM COATED ORAL
Refills: 0 | Status: DISCONTINUED | OUTPATIENT
Start: 2024-10-14 | End: 2024-10-14

## 2024-10-14 RX ORDER — CHLORHEXIDINE GLUCONATE ORAL RINSE 1.2 MG/ML
1 SOLUTION DENTAL ONCE
Refills: 0 | Status: COMPLETED | OUTPATIENT
Start: 2024-10-14 | End: 2024-10-14

## 2024-10-14 RX ADMIN — TRAMADOL HYDROCHLORIDE 50 MILLIGRAM(S): 50 TABLET, COATED ORAL at 08:14

## 2024-10-14 RX ADMIN — Medication 12.5 MILLIGRAM(S): at 18:08

## 2024-10-14 RX ADMIN — Medication 100 MILLIGRAM(S): at 16:32

## 2024-10-14 RX ADMIN — Medication 1000 MILLIGRAM(S): at 18:07

## 2024-10-14 RX ADMIN — OXYCODONE HYDROCHLORIDE 5 MILLIGRAM(S): 30 TABLET, FILM COATED, EXTENDED RELEASE ORAL at 16:05

## 2024-10-14 RX ADMIN — Medication 325 MILLIGRAM(S): at 18:08

## 2024-10-14 RX ADMIN — ENOXAPARIN SODIUM 30 MILLIGRAM(S): 150 INJECTION SUBCUTANEOUS at 20:07

## 2024-10-14 RX ADMIN — SACUBITRIL AND VALSARTAN 1 TABLET(S): 97; 103 TABLET, FILM COATED ORAL at 18:08

## 2024-10-14 RX ADMIN — Medication 3 MILLILITER(S): at 14:32

## 2024-10-14 RX ADMIN — EZETIMIBE 10 MILLIGRAM(S): 10 TABLET ORAL at 18:08

## 2024-10-14 RX ADMIN — OXYCODONE HYDROCHLORIDE 5 MILLIGRAM(S): 30 TABLET, FILM COATED, EXTENDED RELEASE ORAL at 15:24

## 2024-10-14 RX ADMIN — CHLORHEXIDINE GLUCONATE ORAL RINSE 1 APPLICATION(S): 1.2 SOLUTION DENTAL at 08:13

## 2024-10-14 RX ADMIN — Medication 81 MILLIGRAM(S): at 18:07

## 2024-10-14 RX ADMIN — Medication 1000 MILLIGRAM(S): at 18:47

## 2024-10-14 RX ADMIN — CELECOXIB 200 MILLIGRAM(S): 200 CAPSULE ORAL at 08:14

## 2024-10-14 RX ADMIN — Medication 975 MILLIGRAM(S): at 08:13

## 2024-10-14 RX ADMIN — KETOROLAC TROMETHAMINE 15 MILLIGRAM(S): 10 TABLET, FILM COATED ORAL at 19:25

## 2024-10-14 NOTE — CONSULT NOTE ADULT - ASSESSMENT
Confidential Drug Utilization Report  Search Terms: Mason Sigala, 1955Search Date: 10/14/2024 14:28:30 PM  The Drug Utilization Report below displays all of the controlled substance prescriptions, if any, that your patient has filled in the last twelve months. The information displayed on this report is compiled from pharmacy submissions to the Department, and accurately reflects the information as submitted by the pharmacies.    This report was requested by: Dayanara Beck | Reference #: [id number]    There are no results for the search terms that you entered.

## 2024-10-14 NOTE — DISCHARGE NOTE PROVIDER - CARE PROVIDER_API CALL
Demetrio Gallardo  Orthopaedic Surgery  [id number] [place] [address], [address]  [place] [address]  Phone: [phone]  Fax: [phone]  Follow Up Time: 2 weeks

## 2024-10-14 NOTE — CONSULT NOTE ADULT - SUBJECTIVE AND OBJECTIVE BOX
Source of information: TARA WARE, Chart review  Patient language: English  : n/a    HPI:    Patient is a 69y old  Male with PMH OA DM2 HLD HTN who presents to the hospital for scheduled left TKR on 10/14 POD #0. Pain consulted for postop pain. Pt seen and examined at bedside. Denies pain, reports mild achiness, tolerable at this time.  SCALE USED: (1-10 VNRS). Pt with b/l lower extremity numbness, greatest over left foot, improving.  Pt tolerating PO diet. Denies lethargy, chest pain, SOB, nausea, vomiting, constipation. Patient stated goal for pain control: to be able to take deep breaths, get out of bed to chair and ambulate with tolerable pain control. Has not yet been out of bed with PT. Pt denies taking medications for pain at home.     PAST MEDICAL & SURGICAL HISTORY:  Hyperlipidemia  Hyperlipidemia      Essential hypertension  Hypertension      Atherosclerosis of coronary artery  s/p stent in 2009 and angioplasty in 2010      Osteoarthritis  Osteoarthritis      Gout  Gout      Type 2 diabetes mellitus  DM2 (diabetes mellitus, type 2)      Post-traumatic osteoarthritis of left knee      Other acquired absence of organ  S/P splenectomy      History of total hip replacement  left hip, April 2015      History of splenectomy      History of automatic internal cardiac defibrillator (AICD)      History of atherectomy      History of lumbar laminectomy      S/P coronary artery stent placement          FAMILY HISTORY:  FH: MI (myocardial infarction)        Social History:   [X ] Denies ETOH use, illicit drug use and smoking    Allergies    No Known Allergies    MEDICATIONS  (STANDING):  acetaminophen     Tablet .. 1000 milliGRAM(s) Oral every 6 hours  aspirin enteric coated 81 milliGRAM(s) Oral daily  atorvastatin 80 milliGRAM(s) Oral at bedtime  carvedilol 12.5 milliGRAM(s) Oral every 12 hours  ceFAZolin   IVPB 2000 milliGRAM(s) IV Intermittent every 8 hours  dextrose 5%. 1000 milliLiter(s) (50 mL/Hr) IV Continuous <Continuous>  dextrose 5%. 1000 milliLiter(s) (100 mL/Hr) IV Continuous <Continuous>  dextrose 50% Injectable 25 Gram(s) IV Push once  dextrose 50% Injectable 25 Gram(s) IV Push once  dextrose 50% Injectable 12.5 Gram(s) IV Push once  enoxaparin Injectable 30 milliGRAM(s) SubCutaneous every 12 hours  ezetimibe 10 milliGRAM(s) Oral daily  ferrous    sulfate 325 milliGRAM(s) Oral daily  glucagon  Injectable 1 milliGRAM(s) IntraMuscular once  insulin lispro (ADMELOG) corrective regimen sliding scale   SubCutaneous three times a day before meals  pantoprazole    Tablet 40 milliGRAM(s) Oral before breakfast  polyethylene glycol 3350 17 Gram(s) Oral at bedtime  sacubitril 97 mG/valsartan 103 mG 1 Tablet(s) Oral two times a day  senna 2 Tablet(s) Oral at bedtime  sodium chloride 0.9% lock flush 3 milliLiter(s) IV Push every 8 hours  sodium chloride 0.9%. 1000 milliLiter(s) (150 mL/Hr) IV Continuous <Continuous>  spironolactone 25 milliGRAM(s) Oral daily  traMADol 50 milliGRAM(s) Oral every 8 hours    MEDICATIONS  (PRN):  dextrose Oral Gel 15 Gram(s) Oral once PRN Blood Glucose LESS THAN 70 milliGRAM(s)/deciliter  ketorolac   Injectable 15 milliGRAM(s) IV Push every 6 hours PRN Severe Pain (7 - 10)  magnesium hydroxide Suspension 30 milliLiter(s) Oral daily PRN Constipation  ondansetron Injectable 4 milliGRAM(s) IV Push every 6 hours PRN Nausea and/or Vomiting  oxyCODONE    IR 5 milliGRAM(s) Oral every 4 hours PRN Moderate Pain (4 - 6)      Vital Signs Last 24 Hrs  T(C): 36.3 (14 Oct 2024 14:18), Max: 36.9 (14 Oct 2024 08:02)  T(F): 97.4 (14 Oct 2024 14:18), Max: 98.4 (14 Oct 2024 08:02)  HR: 67 (14 Oct 2024 14:18) (67 - 86)  BP: 119/74 (14 Oct 2024 14:18) (93/55 - 151/96)  BP(mean): 74 (14 Oct 2024 13:30) (68 - 82)  RR: 18 (14 Oct 2024 14:18) (11 - 19)  SpO2: 94% (14 Oct 2024 14:18) (94% - 99%)    Parameters below as of 14 Oct 2024 14:18  Patient On (Oxygen Delivery Method): room air        LABS: Reviewed.                          13.8   6.31  )-----------( 272      ( 14 Oct 2024 12:04 )             39.2     10-14    142  |  112[H]  |  11  ----------------------------<  103[H]  4.4   |  24  |  1.10    Ca    8.9      14 Oct 2024 12:04          Urinalysis Basic - ( 14 Oct 2024 12:04 )    Color: x / Appearance: x / SG: x / pH: x  Gluc: 103 mg/dL / Ketone: x  / Bili: x / Urobili: x   Blood: x / Protein: x / Nitrite: x   Leuk Esterase: x / RBC: x / WBC x   Sq Epi: x / Non Sq Epi: x / Bacteria: x      CAPILLARY BLOOD GLUCOSE      POCT Blood Glucose.: 97 mg/dL (14 Oct 2024 11:41)  POCT Blood Glucose.: 108 mg/dL (14 Oct 2024 10:20)  POCT Blood Glucose.: 133 mg/dL (14 Oct 2024 08:06)        Radiology: None to review.       ORT Score -   Family Hx of substance abuse	Female	      Male  Alcohol 	                                           1                     3  Illegal drugs	                                   2                     3  Rx drugs                                           4 	                  4  Personal Hx of substance abuse		  Alcohol 	                                          3	                  3  Illegal drugs                                     4	                  4  Rx drugs                                            5 	                  5  Age between 16- 45 years	           1                     1  hx preadolescent sexual abuse	   3 	                  0  Psychological disease		  ADD, OCD, bipolar, schizophrenia   2	          2  Depression                                           1 	          1  Total: 0    a score of 3 or lower indicates low risk for opioid abuse		  a score of 4-7 indicates moderate risk for opioid abuse		  a score of 8 or higher indicates high risk for opioid abuse    REVIEW OF SYSTEMS:  CONSTITUTIONAL: No fever or fatigue  HEENT:  No difficulty hearing, no change in vision  NECK: No pain or stiffness  RESPIRATORY: No cough, wheezing, chills or hemoptysis; No shortness of breath  CARDIOVASCULAR: No chest pain, palpitations, dizziness, or leg swelling  GASTROINTESTINAL: No loss of appetite, decreased PO intake. No abdominal or epigastric pain. No nausea, vomiting; No diarrhea or constipation.   GENITOURINARY: No dysuria, frequency, hematuria, retention or incontinence  MUSCULOSKELETAL: + left knee joint pain and swelling; No back pain. no upper or lower motor strength weakness, no saddle anesthesia, bowel/bladder incontinence, no falls   NEURO: No headaches, + left foot numbness/tingling b/l LE     PHYSICAL EXAM:  GENERAL:  Alert & Oriented X4, cooperative, NAD, Good concentration. Speech is clear.   RESPIRATORY: Respirations even and unlabored. Clear to auscultation bilaterally; No rales, rhonchi, wheezing, or rubs  CARDIOVASCULAR: Normal S1/S2, regular rate and rhythm; No murmurs, rubs, or gallops. No JVD.   GASTROINTESTINAL: + obese per BMI Soft, Nontender, Nondistended; Bowel sounds present  PERIPHERAL VASCULAR:  Extremities warm with left knee edema. 2+ Peripheral Pulses, No cyanosis, No calf tenderness  MUSCULOSKELETAL: Motor Strength 5/5 B/L upper and lower extremities; + decreased left knee ROM + left knee tenderness on palpation   SKIN: Warm, dry, intact. No rashes, lesions, + left knee ace wrap dsg c/d/i     Risk factors associated with adverse outcomes related to opioid treatment  [ ]  Concurrent benzodiazepine use  [ ]  History/ Active substance use or alcohol use disorder  [ ] Psychiatric co-morbidity  [ ] Sleep apnea  [ ] COPD  [ ] BMI> 35  [ ] Liver dysfunction  [ ] Renal dysfunction  [ ] CHF  [ ] Smoker  [X]  Age > 60 years    [X ]  [place]  Reviewed and Copied to Chart. See below.    Plan of care and goal oriented pain management treatment options were discussed with patient and /or primary care giver; all questions and concerns were addressed and care was aligned with patient's wishes.    Educated patient on goal oriented pain management treatment options

## 2024-10-14 NOTE — DISCHARGE NOTE PROVIDER - NSDCCPCAREPLAN_GEN_ALL_CORE_FT
PRINCIPAL DISCHARGE DIAGNOSIS  Diagnosis: Post-traumatic osteoarthritis of left knee  Assessment and Plan of Treatment: Pain Management- See Attached Medication Reconciliation  StretchStrap Stretching exercises for Total knee replacement***  Weight Bearing Status: WBAT to LLE with walker  Equipment needs: Commode, Walker  Dressing: Please keep bandage/dressing Clean, Dry, and Intact. LEAVE AQUACELL DRESSING ON UNTIL FOLLOW UP VISIT or no longer clean and intact/saturated dresing.   if aquacel removed, change with waterproof dressing every 4-5 days or with regular dressing (4x4, abd, ACE. mepilex, etc) every 2-3 days.    Incision Site: Absorbable sutures were placed  Dvt prophylaxis: Therapy are Activities of Daily Living as appropriate  Follow up with Orthopedic Surgeon Dr. Gallardo in TWO weeks      SECONDARY DISCHARGE DIAGNOSES  Diagnosis: Obesity  Assessment and Plan of Treatment: Continue with home medications as per Primary Care Provider recommendations    Diagnosis: HLD (hyperlipidemia)  Assessment and Plan of Treatment: Continue with home medications as per Primary Care Provider recommendations    Diagnosis: HTN (hypertension)  Assessment and Plan of Treatment: Continue with home medications as per Primary Care Provider recommendations     PRINCIPAL DISCHARGE DIAGNOSIS  Diagnosis: Post-traumatic osteoarthritis of left knee  Assessment and Plan of Treatment: Pain Management- See Attached Medication Reconciliation  StretchStrap Stretching exercises for Total knee replacement***  Weight Bearing Status: WBAT to LLE with walker  Equipment needs: Commode, Walker  Dressing: Please keep bandage/dressing Clean, Dry, and Intact. LEAVE AQUACELL DRESSING ON UNTIL FOLLOW UP VISIT or no longer clean and intact/saturated dresing.   if aquacel removed, change with waterproof dressing every 4-5 days or with regular dressing (4x4, abd, ACE. mepilex, etc) every 2-3 days.    Incision Site: Absorbable sutures were placed  Dvt prophylaxis: Take aspiring 325mg twice a day for 2 weeks. After 2 weeks resume normal home dose of 81mg daily  Therapy are Activities of Daily Living as appropriate  Follow up with Orthopedic Surgeon Dr. Gallardo in TWO weeks      SECONDARY DISCHARGE DIAGNOSES  Diagnosis: HTN (hypertension)  Assessment and Plan of Treatment: Continue with home medications as per Primary Care Provider recommendations    Diagnosis: Obesity  Assessment and Plan of Treatment: Continue with home medications as per Primary Care Provider recommendations    Diagnosis: HLD (hyperlipidemia)  Assessment and Plan of Treatment: Continue with home medications as per Primary Care Provider recommendations

## 2024-10-14 NOTE — PROVIDER CONTACT NOTE (CHANGE IN STATUS NOTIFICATION) - RECOMMENDATIONS
no intervention,continue monitoring
no intervention,continue ,monitoring
as per same Needs AICD interrogation need cardiac md to see patient ,anesthesia made aware they need to inform cardiac md

## 2024-10-14 NOTE — PROVIDER CONTACT NOTE (CHANGE IN STATUS NOTIFICATION) - ASSESSMENT
patient asymptomatic
patient alert and orientedx4,bp repostioned,asymptomatic,hob on 30 degree angle
patient asymptomatic,alert and orientedx4,capillary refill less than 3sec on both lower and upper extriemties,skin warm to touch,seen by pretty Tyson and examined patient at bedside

## 2024-10-14 NOTE — ASU PATIENT PROFILE, ADULT - NSICDXPASTSURGICALHX_GEN_ALL_CORE_FT
PAST SURGICAL HISTORY:  History of atherectomy     History of automatic internal cardiac defibrillator (AICD)     History of lumbar laminectomy     History of splenectomy     History of total hip replacement left hip, April 2015    Other acquired absence of organ S/P splenectomy    S/P coronary artery stent placement

## 2024-10-14 NOTE — DISCHARGE NOTE PROVIDER - NSDCFUSCHEDAPPT_GEN_ALL_CORE_FT
German Huston  [facility] Physician [facility]  [facility] [address]  Scheduled Appointment: 10/21/2024    [facility]  HEART[facility] [address]  Scheduled Appointment: 10/22/2024    Kvng Farfan  [facility]  HEARTV[facility] [address]  Scheduled Appointment: 12/18/2024    Brianna Tsai  [facility] Physician [facility]  INTMED [address]  Scheduled Appointment: 01/06/2025

## 2024-10-14 NOTE — DISCHARGE NOTE PROVIDER - HOSPITAL COURSE
Pt is a 68 y/o M who underwent elective left total knee replacement on 10/14/24. No complications. Pt received daily Physical therapy and Deep vein thrombosis prophylaxis postoperatively. Stable for discharge home.

## 2024-10-14 NOTE — PHYSICAL THERAPY INITIAL EVALUATION ADULT - TRANSFER SAFETY CONCERNS NOTED: SIT/STAND, REHAB EVAL
decreased proprioception/stepping too close to front of assistive device/inability to maintain weight-bearing restrictions w/o assist/decreased weight-shifting ability

## 2024-10-14 NOTE — DISCHARGE NOTE PROVIDER - NSDCFUADDAPPT_GEN_ALL_CORE_FT
APPTS ARE READY TO BE MADE: [X] YES    Best Family or Patient Contact (if needed):  N/A  Additional Information about above appointments (if needed):  N/A  Other comments or requests:    APPTS ARE READY TO BE MADE: [X] YES    Best Family or Patient Contact (if needed):  N/A  Additional Information about above appointments (if needed):  N/A  Other comments or requests:       Appointment was scheduled by our team on the patient's behalf through the provider's office for Orthopedic Surgery with Dr. Gallardo on 10/30/2024 at 11am, [facility] [place] [address].

## 2024-10-14 NOTE — DISCHARGE NOTE NURSING/CASE MANAGEMENT/SOCIAL WORK - NSDCPEFALRISK_GEN_ALL_CORE
For information on Fall & Injury Prevention, visit: [email or web address] OR  [email or web address] OR  https://www.cdc.gov/steadi/patient.html

## 2024-10-14 NOTE — PHYSICAL THERAPY INITIAL EVALUATION ADULT - PERTINENT HX OF CURRENT PROBLEM, REHAB EVAL
Patient states required surgery due to Patient states required surgery due to injuring his left knee while at work.

## 2024-10-14 NOTE — DISCHARGE NOTE PROVIDER - NSDCMRMEDTOKEN_GEN_ALL_CORE_FT
Advil 200 mg oral tablet: 1 tab(s) orally every 8 hours as needed for  moderate pain  aspirin 81 mg oral delayed release tablet: 1 tab(s) orally once a day  carvedilol 12.5 mg oral tablet: 1 tab(s) orally 2 times a day  Entresto 97 mg-103 mg oral tablet: 1 tab(s) orally 2 times a day  Farxiga 10 mg oral tablet: 1 tab(s) orally once a day  metFORMIN 500 mg oral tablet: 1 tab(s) orally 2 times a day.   spironolactone 25 mg oral tablet: 1 tab(s) orally once a day  Tylenol 500 mg oral tablet: 2 tab(s) orally every 8 hours as needed for  moderate pain  Zetia 10 mg oral tablet: 1 tab(s) orally once a day   aspirin 325 mg oral tablet: 1 tab(s) orally every 12 hours MDD: 2  carvedilol 12.5 mg oral tablet: 1 tab(s) orally 2 times a day  Entresto 97 mg-103 mg oral tablet: 1 tab(s) orally 2 times a day  Farxiga 10 mg oral tablet: 1 tab(s) orally once a day  metFORMIN 500 mg oral tablet: 1 tab(s) orally 2 times a day.   MiraLax oral powder for reconstitution: 17 gram(s) orally once a day as needed for  constipation MDD: 1  Percocet 5 mg-325 mg oral tablet: 1 tab(s) orally every 6 hours as needed for  severe pain MDD: 4  Senna 8.6 mg oral tablet: 1 tab(s) orally 2 times a day as needed for  constipation MDD: 2  spironolactone 25 mg oral tablet: 1 tab(s) orally once a day  Zetia 10 mg oral tablet: 1 tab(s) orally once a day

## 2024-10-14 NOTE — PHYSICAL THERAPY INITIAL EVALUATION ADULT - THERAPY FREQUENCY, PT EVAL
[facility]  PICU DAILY PROGRESS NOTE    ADMISSION DATE:  2022  DATE:  2022  CURRENT HOSPITAL DAY:  Hospital Day: 4       CHIEF COMPLAINT:  apnea    SUBJECTIVE / OVERNIGHT EVENTS     Afebrile with vitals stable. No further apneic episodes. On HFNC 3L 35%.  2x deep suction overnight  Tolerating PO intake. 6.3cc/kg/hr,  I/O -41.5    OBJECTIVE      VITAL SIGNS:      Temp:  [96.8 °F (36 °C)-99.3 °F (37.4 °C)] 97.9 °F (36.6 °C)  Heart Rate:  [136-177] 136  Resp:  [29-71] 52  SpO2:  [90 %-100 %] 95 %  BP: (75-99)/(36-57) 84/45        Weight: Recent: (3.405 kg (7 lb 8.1 oz) (10/03/22 0200), Admission: 3.24 kg (7 lb 2.3 oz) (10/01/22 1520)   Length: 18.7\" (47.5 cm) (10/01/22 1520)  BMI (Calculated): 15.09 (10/03/22 0200)    BSA (Calculated - sq m): 0.21 (10/01/22 1520)    INTAKE/OUTPUT:    Intake/Output  Report      10/03 0700  10/04 0659 10/04 0700  10/05 0659    P.O. 300     I.V. 60     IV Piggyback 52.5     Total Intake 412.5     Urine 454     Stool      Total Output 454     Net -41.5                   RESPIRATORY SUPPORT:  VENT SETTINGS (Most recent recorded value from last 24 hours): FiO2 (%): 35 %    PHYSICAL EXAM:    Physical Exam  Vitals reviewed.   Constitutional:       Non toxic  HENT:      Head: Normocephalic and atraumatic. Anterior fontanelle is flat.      Right Ear: External ear normal.      Left Ear: External ear normal.     Nose: Congestion and rhinorrhea present. HFNC cannula in place     Mouth/Throat:      Mouth: Mucous membranes are moist.      Pharynx: Oropharynx is clear.      Neck: Normal range of motion and neck supple.   Eyes:      General: Eyes closed     Right eye: No discharge.         Left eye: No discharge.   Cardiovascular:      Rate and Rhythm: Regular rate and rhythm     Pulses: Normal pulses.      Heart sounds: Normal heart sounds. No murmur heard.  Pulmonary:      Effort: Pulmonary effort is normal.      Breath sounds: Normal breath sounds. No wheezing, rales.  Rhonchi heard bilaterally.   Abdominal:      General: Abdomen is flat. There is no distension.      Palpations: Abdomen is soft.      Tenderness: There is no abdominal tenderness. There is no guarding or rebound.   Genitourinary:     General: Normal vulva.      Rectum: Normal.      Comments: Diaper rash  Sacral dimple to the right of midline  Musculoskeletal:         General: No swelling or deformity.   Skin:     Capillary Refill: Capillary refill takes less than 2 seconds.      Findings: Rash present. There is diaper rash.   Neurological:      Mental Status: She is alert.      Primitive Reflexes: Suck normal.      Comments: Moving all 4 extremities      LABORATORY DATA:      Complete Blood Count  No results found  Autodiff:  No results found    Metabolic Panel  Recent Labs   Lab 10/03/22  0424 10/01/22  1712 10/01/22  1706   SODIUM 142  --  137   RNA  --  130*  --    POTASSIUM 5.1  --  4.1   RK  --  3.9  --    CHLORIDE 112*  --  102   CO2 20*  --  28   BUN 3*  --  13   CREATININE 0.19  --  0.19   GLUCOSE 83  --  116   RCAI  --  1.28  --      No results found    Invalid input(s): PROT    Cardiac Markers:  No results found   Recent Labs   Lab 10/01/22  1712   RVLA 2.1*       Inflammatory markers:  Recent Labs   Lab 10/01/22  1706   CRP 5.9*       Coagulation Profile:  No results found     Therapeutic Drug Monitoring:  No results found    Blood Gases:  Arterial:     No results found    Venous:   Recent Labs   Lab 10/01/22  1712   RVPH 7.35   RVPCO2 56*   RVPO2 36   RVHCO3 30.9*   RVBED 5*   RVSAT 76        Cultures/Viral PCR studies:  Recent Labs   Lab 10/03/22  1254   GS No polymorphonuclear cells seen.  No organisms seen.  Slide prepared by [employer].   CSFCS Culture in progress.       Recent Labs   Lab 10/01/22  1639   [id number] Not Detected       Recent Labs   Lab 10/03/22  1254   CMVRS Not Detected   ENTVRS Not Detected   ESCOLI Not Detected   HAEFLU Not Detected   HSVTX1 Not Detected   HSVTX2 Not Detected    HHVX6 Not Detected   PAECRS Not Detected   LISMON Not Detected   STRAGA Not Detected   STRPNE Not Detected   VZVRS Not Detected       IMAGING STUDIES:      Results for orders placed or performed during the hospital encounter of 10/01/22 (from the past 48 hour(s))   US SPINAL CANAL AND CONTENTS    Impression    Unremarkable spine ultrasound.    Electronically Signed by: ANA MARIA MACIAS M.D.   Signed on: 2022 11:20 AM      XR CHEST PA OR AP 1 VIEW    Impression    Opacities are nonspecific and could represent atelectasis or infection.    Electronically Signed by: CHAD WATSON M.D.   Signed on: 2022 6:34 PM         Imaging studies reviewed.           MEDICATIONS:    Continuous Infusions:   • dextrose 5 % / sodium chloride 0.45% infusion   Intravenous     Scheduled Meds:   • ampicillin 162 mg in NS pediatric IV syringe Intravenous Q8H   • sodium chloride (PF) 0.9 % injection 0.5-1 mL Intravenous Q6H   • cefTAZidime 164 mg in dextrose 5% pediatric IVPB syringe Intravenous Q8H     PRN Meds:   • lidocaine (ANECREAM/LMX) 4 % cream 1 application  1 application Topical PRN   • sodium chloride (PF) 0.9 % injection 0.5-1 mL  0.5-1 mL Intravenous PRN   • acetaminophen (TYLENOL) rectal suppository 40 mg  15 mg/kg (Dosing Weight) Rectal Q6H PRN             ASSESSMENT      Brittney Tuttle is a 1 week old female born [place] admitted with apnea and bradycardia spells in the setting of RSV infection. Patient with RSV infection but given age full septic work up completed. Blood work reassuring. Based on chest x-ray opacities and continued increase in resp support can not rule out pneumonia. Will narrow antibiotics to pneumonia.          PLAN      Respiratory:  Airway clearance respiratory therapy: Nasal and Nasopharyngeal suctioning PRN  Continuous pulse oximetry, maintain SpO2 between 90 to 100 %   HFNC @ 3L35%. Wean as tolerated.    Fluid, Electrolytes, Nutrition:  Fluid status: Euvolemia  Fluid Goal for next 24 hrs: Net  even  D5NS @ 12 mL/hour  Diuretics: Not applicable  Nutrition: PO feeds    GI:  Bowel regimen: Not applicable    Neuro:   , Goal SBS: N/A.  CAP-D Score: 1, Monitor CAP-D q12 hours.  Tylenol PRN      Cardiovascular:  Continuous cardiopulmonary monitoring     Heme:  VTE score 1 for PICU  Too small for SCDs    ID:  RSV+  ID on consult. Follow up eccs.  F/u OSH BCx, UCx, CSF culture   Ampicillin 30mg/kg q8 ( total antibiotic course 5 days)  S/p Continue Ampicillin 75 mg/kg (s10/1-10/4)   s/p Continue Ceftazidime 50 mg/kg (s10/10/4)  Isolation per protocol  Patient on Antibiotics:  - Yes; (1) Type of Infection ---- Empiric (Suspected Infection)  (2) Diagnosis for Antibiotics ---- Sepsis Rule-Out  (3) Diagnostic Work Up done ---- Blood Culture  CBC with Diff  Urine Culture  (4) Total Duration for Antibiotics ----  2-3 Days    Lines/Tubes/Drains:  Peripheral IV 10/04/22 Right Saphenous 24 (Active)        [x] The use, function and necessity of all lines and invasive devices was discussed on rounds.    Lab/Imaging Schedule:      Pertinent Review:    [x] Medication List reviewed. Orders placed for changes noted above. Continue other medications at current dosages and frequency.    [x] Problem list reviewed and updated.      PICU UP Level:  Level 3: Level 1 and 2 activities plus OOB to chair TID or sitting up in bed TID if appropriate chair is not available; ambulate BID if trunk control present     Family present on rounds: Yes.  I have explained plan of care to patient's Mother and father    Disposition: This patient is at risk for sudden significant clinical deterioration and will remain in the PICU for close cardiorespiratory and neurological monitoring.     Plan was discussed with bedside RN, RT, PICU attending Dr Cooper and PICU fellow Dr Marino . All in agreement with the plan.       James Suggs DO  Pediatric Resident PGY-2  10/04/22 8:20 AM       daily

## 2024-10-14 NOTE — DISCHARGE NOTE NURSING/CASE MANAGEMENT/SOCIAL WORK - NSDCFUADDAPPT_GEN_ALL_CORE_FT
APPTS ARE READY TO BE MADE: [X] YES    Best Family or Patient Contact (if needed):  N/A  Additional Information about above appointments (if needed):  N/A  Other comments or requests:

## 2024-10-14 NOTE — ASU PATIENT PROFILE, ADULT - FALL HARM RISK - UNIVERSAL INTERVENTIONS
Bed in lowest position, wheels locked, appropriate side rails in place/Call bell, personal items and telephone in reach/Instruct patient to call for assistance before getting out of bed or chair/Non-slip footwear when patient is out of bed/[place] to call system/Physically safe environment - no spills, clutter or unnecessary equipment/Purposeful Proactive Rounding/Room/bathroom lighting operational, light cord in reach

## 2024-10-14 NOTE — PATIENT PROFILE ADULT - FALL HARM RISK - HARM RISK INTERVENTIONS
Assistance with ambulation/Assistance OOB with selected safe patient handling equipment/Communicate Risk of Fall with Harm to all staff/Discuss with provider need for PT consult/Monitor gait and stability/Provide patient with walking aids - walker, cane, crutches/Reinforce activity limits and safety measures with patient and family/Sit up slowly, dangle for a short time, stand at bedside before walking/Tailored Fall Risk Interventions/Use of alarms - bed, chair and/or voice tab/Visual Cue: Yellow wristband and red socks/Bed in lowest position, wheels locked, appropriate side rails in place/Call bell, personal items and telephone in reach/Instruct patient to call for assistance before getting out of bed or chair/Non-slip footwear when patient is out of bed/[place] to call system/Physically safe environment - no spills, clutter or unnecessary equipment/Purposeful Proactive Rounding/Room/bathroom lighting operational, light cord in reach

## 2024-10-14 NOTE — DISCHARGE NOTE PROVIDER - NSDCCPTREATMENT_GEN_ALL_CORE_FT
PRINCIPAL PROCEDURE  Procedure: Left total knee arthroplasty  Findings and Treatment: s/p left total knee replacement on 10/14/24  If patient has drainage from the wound, please contact the surgeon's office.   If patient has intractable pain, please contact the surgeon's office.   If excessively warm at the incision site is noted, please contact the surgeon's office.   If redness is noted, especially spreading, please contact the surgeon's office.   If patient has fever over 101F please return to the hospital through the Emergency Room.   If mushtaq blood is saturating the dressing, please return to the hospital through the Emergency Room.  If drainage of fluid or pus is noted, please return to the hospital through the Emergency Room.

## 2024-10-14 NOTE — DISCHARGE NOTE NURSING/CASE MANAGEMENT/SOCIAL WORK - PATIENT PORTAL LINK FT
You can access the FollowMyHealth Patient Portal offered by [facility] by registering at the following website: [email or web address]. By joining [employer]’s FollowMyHealth portal, you will also be able to view your health information using other applications (apps) compatible with our system.

## 2024-10-14 NOTE — ASU PATIENT PROFILE, ADULT - NSICDXPASTMEDICALHX_GEN_ALL_CORE_FT
PAST MEDICAL HISTORY:  Atherosclerosis of coronary artery s/p stent in 2009 and angioplasty in 2010    Essential hypertension Hypertension    Gout Gout    Hyperlipidemia Hyperlipidemia    Osteoarthritis Osteoarthritis    Post-traumatic osteoarthritis of left knee     Type 2 diabetes mellitus DM2 (diabetes mellitus, type 2)

## 2024-10-16 ENCOUNTER — APPOINTMENT (OUTPATIENT)
Dept: HEART AND VASCULAR | Facility: CLINIC | Age: 69
End: 2024-10-16

## 2024-10-21 ENCOUNTER — APPOINTMENT (OUTPATIENT)
Dept: HEART AND VASCULAR | Facility: CLINIC | Age: 69
End: 2024-10-21

## 2024-10-22 ENCOUNTER — NON-APPOINTMENT (OUTPATIENT)
Age: 69
End: 2024-10-22

## 2024-10-22 ENCOUNTER — APPOINTMENT (OUTPATIENT)
Dept: HEART AND VASCULAR | Facility: CLINIC | Age: 69
End: 2024-10-22
Payer: MEDICARE

## 2024-10-22 LAB — SURGICAL PATHOLOGY STUDY: SIGNIFICANT CHANGE UP

## 2024-10-22 PROCEDURE — 93296 REM INTERROG EVL PM/IDS: CPT

## 2024-10-22 PROCEDURE — 93295 DEV INTERROG REMOTE 1/2/MLT: CPT

## 2024-11-01 ENCOUNTER — RX RENEWAL (OUTPATIENT)
Age: 69
End: 2024-11-01

## 2024-11-04 ENCOUNTER — APPOINTMENT (OUTPATIENT)
Dept: HEART AND VASCULAR | Facility: CLINIC | Age: 69
End: 2024-11-04

## 2024-11-06 ENCOUNTER — APPOINTMENT (OUTPATIENT)
Dept: INTERNAL MEDICINE | Facility: CLINIC | Age: 69
End: 2024-11-06

## 2024-12-09 ENCOUNTER — OUTPATIENT (OUTPATIENT)
Dept: OUTPATIENT SERVICES | Facility: HOSPITAL | Age: 69
LOS: 1 days | End: 2024-12-09
Payer: MEDICARE

## 2024-12-09 ENCOUNTER — APPOINTMENT (OUTPATIENT)
Dept: HEART AND VASCULAR | Facility: CLINIC | Age: 69
End: 2024-12-09
Payer: MEDICARE

## 2024-12-09 ENCOUNTER — NON-APPOINTMENT (OUTPATIENT)
Age: 69
End: 2024-12-09

## 2024-12-09 VITALS
SYSTOLIC BLOOD PRESSURE: 133 MMHG | BODY MASS INDEX: 33.6 KG/M2 | DIASTOLIC BLOOD PRESSURE: 81 MMHG | HEIGHT: 71 IN | WEIGHT: 240 LBS | TEMPERATURE: 96 F | HEART RATE: 86 BPM

## 2024-12-09 DIAGNOSIS — Z95.5 PRESENCE OF CORONARY ANGIOPLASTY IMPLANT AND GRAFT: Chronic | ICD-10-CM

## 2024-12-09 DIAGNOSIS — Z95.810 PRESENCE OF AUTOMATIC (IMPLANTABLE) CARDIAC DEFIBRILLATOR: Chronic | ICD-10-CM

## 2024-12-09 DIAGNOSIS — Z98.890 OTHER SPECIFIED POSTPROCEDURAL STATES: Chronic | ICD-10-CM

## 2024-12-09 DIAGNOSIS — Z95.0 PRESENCE OF CARDIAC PACEMAKER: ICD-10-CM

## 2024-12-09 PROCEDURE — 72148 MRI LUMBAR SPINE W/O DYE: CPT | Mod: 26

## 2024-12-09 PROCEDURE — 93284 PRGRMG EVAL IMPLANTABLE DFB: CPT

## 2024-12-09 PROCEDURE — 72148 MRI LUMBAR SPINE W/O DYE: CPT

## 2024-12-11 PROBLEM — Z95.0 CARDIAC PACEMAKER IN SITU: Status: ACTIVE | Noted: 2024-12-11

## 2024-12-18 ENCOUNTER — NON-APPOINTMENT (OUTPATIENT)
Age: 69
End: 2024-12-18

## 2024-12-18 ENCOUNTER — APPOINTMENT (OUTPATIENT)
Dept: HEART AND VASCULAR | Facility: CLINIC | Age: 69
End: 2024-12-18
Payer: MEDICARE

## 2024-12-18 VITALS
HEIGHT: 71 IN | SYSTOLIC BLOOD PRESSURE: 138 MMHG | BODY MASS INDEX: 32.48 KG/M2 | HEART RATE: 78 BPM | OXYGEN SATURATION: 98 % | WEIGHT: 232 LBS | TEMPERATURE: 98.3 F | DIASTOLIC BLOOD PRESSURE: 84 MMHG

## 2024-12-18 DIAGNOSIS — I42.9 CARDIOMYOPATHY, UNSPECIFIED: ICD-10-CM

## 2024-12-18 PROCEDURE — G2211 COMPLEX E/M VISIT ADD ON: CPT

## 2024-12-18 PROCEDURE — 36415 COLL VENOUS BLD VENIPUNCTURE: CPT

## 2024-12-18 PROCEDURE — 99214 OFFICE O/P EST MOD 30 MIN: CPT

## 2024-12-18 PROCEDURE — 93000 ELECTROCARDIOGRAM COMPLETE: CPT

## 2024-12-19 LAB
ALBUMIN SERPL ELPH-MCNC: 4.3 G/DL
ALP BLD-CCNC: 83 U/L
ALT SERPL-CCNC: 10 U/L
ANION GAP SERPL CALC-SCNC: 14 MMOL/L
AST SERPL-CCNC: 14 U/L
BASOPHILS # BLD AUTO: 0.07 K/UL
BASOPHILS NFR BLD AUTO: 1.2 %
BILIRUB SERPL-MCNC: 0.8 MG/DL
BUN SERPL-MCNC: 9 MG/DL
CALCIUM SERPL-MCNC: 9.8 MG/DL
CHLORIDE SERPL-SCNC: 104 MMOL/L
CHOLEST SERPL-MCNC: 159 MG/DL
CO2 SERPL-SCNC: 22 MMOL/L
CREAT SERPL-MCNC: 1.1 MG/DL
EGFR: 72 ML/MIN/1.73M2
EOSINOPHIL # BLD AUTO: 0.09 K/UL
EOSINOPHIL NFR BLD AUTO: 1.6 %
ESTIMATED AVERAGE GLUCOSE: 126 MG/DL
GLUCOSE SERPL-MCNC: 99 MG/DL
HBA1C MFR BLD HPLC: 6 %
HCT VFR BLD CALC: 42.9 %
HDLC SERPL-MCNC: 43 MG/DL
HGB BLD-MCNC: 13.6 G/DL
IMM GRANULOCYTES NFR BLD AUTO: 0.2 %
LDLC SERPL CALC-MCNC: 96 MG/DL
LYMPHOCYTES # BLD AUTO: 2.66 K/UL
LYMPHOCYTES NFR BLD AUTO: 46.4 %
MAN DIFF?: NORMAL
MCHC RBC-ENTMCNC: 31.3 PG
MCHC RBC-ENTMCNC: 31.7 G/DL
MCV RBC AUTO: 98.8 FL
MONOCYTES # BLD AUTO: 0.69 K/UL
MONOCYTES NFR BLD AUTO: 12 %
NEUTROPHILS # BLD AUTO: 2.21 K/UL
NEUTROPHILS NFR BLD AUTO: 38.6 %
NONHDLC SERPL-MCNC: 116 MG/DL
NT-PROBNP SERPL-MCNC: 285 PG/ML
PLATELET # BLD AUTO: 385 K/UL
POTASSIUM SERPL-SCNC: 4.7 MMOL/L
PROT SERPL-MCNC: 7.3 G/DL
RBC # BLD: 4.34 M/UL
RBC # FLD: 17.1 %
SODIUM SERPL-SCNC: 140 MMOL/L
TRIGL SERPL-MCNC: 107 MG/DL
WBC # FLD AUTO: 5.73 K/UL

## 2024-12-20 LAB
CREAT SPEC-SCNC: 78 MG/DL
MICROALBUMIN 24H UR DL<=1MG/L-MCNC: <1.2 MG/DL
MICROALBUMIN/CREAT 24H UR-RTO: NORMAL MG/G

## 2024-12-31 ENCOUNTER — APPOINTMENT (OUTPATIENT)
Dept: NUCLEAR MEDICINE | Facility: HOSPITAL | Age: 69
End: 2024-12-31

## 2025-01-06 ENCOUNTER — APPOINTMENT (OUTPATIENT)
Dept: INTERNAL MEDICINE | Facility: CLINIC | Age: 70
End: 2025-01-06

## 2025-02-25 ENCOUNTER — APPOINTMENT (OUTPATIENT)
Dept: HEART AND VASCULAR | Facility: CLINIC | Age: 70
End: 2025-02-25
Payer: MEDICARE

## 2025-02-25 VITALS
SYSTOLIC BLOOD PRESSURE: 92 MMHG | DIASTOLIC BLOOD PRESSURE: 66 MMHG | WEIGHT: 229 LBS | OXYGEN SATURATION: 95 % | HEIGHT: 71 IN | TEMPERATURE: 98.4 F | BODY MASS INDEX: 32.06 KG/M2

## 2025-02-25 PROCEDURE — 93000 ELECTROCARDIOGRAM COMPLETE: CPT

## 2025-02-25 PROCEDURE — G2211 COMPLEX E/M VISIT ADD ON: CPT

## 2025-02-25 PROCEDURE — 36415 COLL VENOUS BLD VENIPUNCTURE: CPT

## 2025-02-25 PROCEDURE — 99214 OFFICE O/P EST MOD 30 MIN: CPT

## 2025-02-26 LAB
ALBUMIN SERPL ELPH-MCNC: 3.8 G/DL
ALP BLD-CCNC: 65 U/L
ALT SERPL-CCNC: 27 U/L
ANION GAP SERPL CALC-SCNC: 15 MMOL/L
AST SERPL-CCNC: 20 U/L
BASOPHILS # BLD AUTO: 0.04 K/UL
BASOPHILS NFR BLD AUTO: 0.7 %
BILIRUB SERPL-MCNC: 0.5 MG/DL
BUN SERPL-MCNC: 15 MG/DL
CALCIUM SERPL-MCNC: 9.5 MG/DL
CHLORIDE SERPL-SCNC: 104 MMOL/L
CHOLEST SERPL-MCNC: 104 MG/DL
CO2 SERPL-SCNC: 20 MMOL/L
CREAT SERPL-MCNC: 1.15 MG/DL
EGFR: 68 ML/MIN/1.73M2
EOSINOPHIL # BLD AUTO: 0.06 K/UL
EOSINOPHIL NFR BLD AUTO: 1 %
ESTIMATED AVERAGE GLUCOSE: 131 MG/DL
GLUCOSE SERPL-MCNC: 77 MG/DL
HBA1C MFR BLD HPLC: 6.2 %
HCT VFR BLD CALC: 39.2 %
HDLC SERPL-MCNC: 34 MG/DL
HGB BLD-MCNC: 13.1 G/DL
IMM GRANULOCYTES NFR BLD AUTO: 0.2 %
LDLC SERPL CALC-MCNC: 51 MG/DL
LYMPHOCYTES # BLD AUTO: 2.46 K/UL
LYMPHOCYTES NFR BLD AUTO: 41.6 %
MAN DIFF?: NORMAL
MCHC RBC-ENTMCNC: 32.1 PG
MCHC RBC-ENTMCNC: 33.4 G/DL
MCV RBC AUTO: 96.1 FL
MONOCYTES # BLD AUTO: 0.65 K/UL
MONOCYTES NFR BLD AUTO: 11 %
NEUTROPHILS # BLD AUTO: 2.69 K/UL
NEUTROPHILS NFR BLD AUTO: 45.5 %
NONHDLC SERPL-MCNC: 71 MG/DL
NT-PROBNP SERPL-MCNC: 191 PG/ML
PLATELET # BLD AUTO: 347 K/UL
POTASSIUM SERPL-SCNC: 4.6 MMOL/L
PROT SERPL-MCNC: 6.5 G/DL
RBC # BLD: 4.08 M/UL
RBC # FLD: 16.2 %
SODIUM SERPL-SCNC: 139 MMOL/L
TRIGL SERPL-MCNC: 103 MG/DL
WBC # FLD AUTO: 5.91 K/UL

## 2025-02-28 ENCOUNTER — APPOINTMENT (OUTPATIENT)
Dept: HEART AND VASCULAR | Facility: CLINIC | Age: 70
End: 2025-02-28

## 2025-03-04 ENCOUNTER — APPOINTMENT (OUTPATIENT)
Dept: HEART AND VASCULAR | Facility: CLINIC | Age: 70
End: 2025-03-04
Payer: MEDICARE

## 2025-03-04 ENCOUNTER — NON-APPOINTMENT (OUTPATIENT)
Age: 70
End: 2025-03-04

## 2025-03-04 VITALS
WEIGHT: 226 LBS | HEIGHT: 71 IN | SYSTOLIC BLOOD PRESSURE: 92 MMHG | BODY MASS INDEX: 31.64 KG/M2 | TEMPERATURE: 98.5 F | DIASTOLIC BLOOD PRESSURE: 58 MMHG | OXYGEN SATURATION: 95 %

## 2025-03-04 PROCEDURE — 93000 ELECTROCARDIOGRAM COMPLETE: CPT

## 2025-03-04 PROCEDURE — 36415 COLL VENOUS BLD VENIPUNCTURE: CPT

## 2025-03-04 PROCEDURE — G2211 COMPLEX E/M VISIT ADD ON: CPT

## 2025-03-04 PROCEDURE — 99214 OFFICE O/P EST MOD 30 MIN: CPT

## 2025-03-04 RX ORDER — METOPROLOL SUCCINATE 100 MG/1
100 TABLET, EXTENDED RELEASE ORAL
Qty: 180 | Refills: 3 | Status: ACTIVE | COMMUNITY
Start: 2025-03-04 | End: 1900-01-01

## 2025-03-05 LAB
INFLUENZA A RESULT: NOT DETECTED
INFLUENZA B RESULT: NOT DETECTED
RESP SYN VIRUS RESULT: NOT DETECTED
SARS-COV-2 RESULT: DETECTED

## 2025-03-13 ENCOUNTER — APPOINTMENT (OUTPATIENT)
Dept: HEART AND VASCULAR | Facility: CLINIC | Age: 70
End: 2025-03-13

## 2025-03-13 ENCOUNTER — APPOINTMENT (OUTPATIENT)
Dept: HEART AND VASCULAR | Facility: CLINIC | Age: 70
End: 2025-03-13
Payer: MEDICARE

## 2025-03-13 ENCOUNTER — NON-APPOINTMENT (OUTPATIENT)
Age: 70
End: 2025-03-13

## 2025-03-13 VITALS
BODY MASS INDEX: 31.5 KG/M2 | DIASTOLIC BLOOD PRESSURE: 70 MMHG | SYSTOLIC BLOOD PRESSURE: 122 MMHG | TEMPERATURE: 98.2 F | HEIGHT: 71 IN | OXYGEN SATURATION: 97 % | WEIGHT: 225 LBS | HEART RATE: 79 BPM

## 2025-03-13 DIAGNOSIS — I42.9 CARDIOMYOPATHY, UNSPECIFIED: ICD-10-CM

## 2025-03-13 PROCEDURE — G2211 COMPLEX E/M VISIT ADD ON: CPT

## 2025-03-13 PROCEDURE — 93000 ELECTROCARDIOGRAM COMPLETE: CPT

## 2025-03-13 PROCEDURE — 93306 TTE W/DOPPLER COMPLETE: CPT

## 2025-03-13 PROCEDURE — 93295 DEV INTERROG REMOTE 1/2/MLT: CPT

## 2025-03-13 PROCEDURE — 93296 REM INTERROG EVL PM/IDS: CPT

## 2025-03-13 PROCEDURE — 99214 OFFICE O/P EST MOD 30 MIN: CPT

## 2025-03-13 RX ORDER — SPIRONOLACTONE 25 MG/1
25 TABLET ORAL DAILY
Refills: 0 | Status: ACTIVE | COMMUNITY

## 2025-03-13 RX ORDER — DAPAGLIFLOZIN 10 MG/1
10 TABLET, FILM COATED ORAL
Qty: 90 | Refills: 2 | Status: ACTIVE | COMMUNITY
Start: 2025-03-13 | End: 1900-01-01

## 2025-03-17 ENCOUNTER — OUTPATIENT (OUTPATIENT)
Dept: OUTPATIENT SERVICES | Facility: HOSPITAL | Age: 70
LOS: 1 days | End: 2025-03-17
Payer: MEDICARE

## 2025-03-17 ENCOUNTER — NON-APPOINTMENT (OUTPATIENT)
Age: 70
End: 2025-03-17

## 2025-03-17 ENCOUNTER — APPOINTMENT (OUTPATIENT)
Dept: HEART AND VASCULAR | Facility: CLINIC | Age: 70
End: 2025-03-17

## 2025-03-17 DIAGNOSIS — Z98.890 OTHER SPECIFIED POSTPROCEDURAL STATES: Chronic | ICD-10-CM

## 2025-03-17 DIAGNOSIS — Z95.5 PRESENCE OF CORONARY ANGIOPLASTY IMPLANT AND GRAFT: Chronic | ICD-10-CM

## 2025-03-17 DIAGNOSIS — Z95.810 PRESENCE OF AUTOMATIC (IMPLANTABLE) CARDIAC DEFIBRILLATOR: Chronic | ICD-10-CM

## 2025-03-17 DIAGNOSIS — Z90.81 ACQUIRED ABSENCE OF SPLEEN: Chronic | ICD-10-CM

## 2025-03-17 PROCEDURE — 73502 X-RAY EXAM HIP UNI 2-3 VIEWS: CPT

## 2025-03-17 PROCEDURE — 73502 X-RAY EXAM HIP UNI 2-3 VIEWS: CPT | Mod: 26,LT

## 2025-03-19 ENCOUNTER — NON-APPOINTMENT (OUTPATIENT)
Age: 70
End: 2025-03-19

## 2025-05-28 ENCOUNTER — APPOINTMENT (OUTPATIENT)
Dept: HEART AND VASCULAR | Facility: CLINIC | Age: 70
End: 2025-05-28
Payer: MEDICARE

## 2025-05-28 ENCOUNTER — NON-APPOINTMENT (OUTPATIENT)
Age: 70
End: 2025-05-28

## 2025-05-28 VITALS
SYSTOLIC BLOOD PRESSURE: 120 MMHG | HEART RATE: 73 BPM | DIASTOLIC BLOOD PRESSURE: 70 MMHG | WEIGHT: 242.99 LBS | HEIGHT: 71 IN | OXYGEN SATURATION: 97 % | TEMPERATURE: 98.1 F | BODY MASS INDEX: 34.02 KG/M2

## 2025-05-28 DIAGNOSIS — E11.9 TYPE 2 DIABETES MELLITUS W/OUT COMPLICATIONS: ICD-10-CM

## 2025-05-28 DIAGNOSIS — I25.10 ATHEROSCLEROTIC HEART DISEASE OF NATIVE CORONARY ARTERY W/OUT ANGINA PECTORIS: ICD-10-CM

## 2025-05-28 DIAGNOSIS — I51.9 HEART DISEASE, UNSPECIFIED: ICD-10-CM

## 2025-05-28 PROCEDURE — 93000 ELECTROCARDIOGRAM COMPLETE: CPT

## 2025-05-28 PROCEDURE — G2211 COMPLEX E/M VISIT ADD ON: CPT

## 2025-05-28 PROCEDURE — 99214 OFFICE O/P EST MOD 30 MIN: CPT

## 2025-05-28 PROCEDURE — 36415 COLL VENOUS BLD VENIPUNCTURE: CPT

## 2025-05-28 RX ORDER — TIRZEPATIDE 2.5 MG/.5ML
2.5 INJECTION, SOLUTION SUBCUTANEOUS
Qty: 4 | Refills: 3 | Status: ACTIVE | COMMUNITY
Start: 2025-05-28 | End: 1900-01-01

## 2025-05-29 LAB
ALBUMIN SERPL ELPH-MCNC: 4.4 G/DL
ALP BLD-CCNC: 69 U/L
ALT SERPL-CCNC: 21 U/L
ANION GAP SERPL CALC-SCNC: 14 MMOL/L
AST SERPL-CCNC: 18 U/L
BILIRUB SERPL-MCNC: 0.9 MG/DL
BUN SERPL-MCNC: 14 MG/DL
CALCIUM SERPL-MCNC: 10 MG/DL
CHLORIDE SERPL-SCNC: 103 MMOL/L
CHOLEST SERPL-MCNC: 162 MG/DL
CO2 SERPL-SCNC: 22 MMOL/L
CREAT SERPL-MCNC: 1.1 MG/DL
CREAT SPEC-SCNC: 60 MG/DL
EGFRCR SERPLBLD CKD-EPI 2021: 71 ML/MIN/1.73M2
ESTIMATED AVERAGE GLUCOSE: 128 MG/DL
GLUCOSE SERPL-MCNC: 81 MG/DL
HBA1C MFR BLD HPLC: 6.1 %
HCT VFR BLD CALC: 42 %
HDLC SERPL-MCNC: 53 MG/DL
HGB BLD-MCNC: 14 G/DL
LDLC SERPL-MCNC: 94 MG/DL
MCHC RBC-ENTMCNC: 31.7 PG
MCHC RBC-ENTMCNC: 33.3 G/DL
MCV RBC AUTO: 95.2 FL
MICROALBUMIN 24H UR DL<=1MG/L-MCNC: <1.2 MG/DL
MICROALBUMIN/CREAT 24H UR-RTO: NORMAL MG/G
NONHDLC SERPL-MCNC: 109 MG/DL
NT-PROBNP SERPL-MCNC: 231 PG/ML
PLATELET # BLD AUTO: 319 K/UL
POTASSIUM SERPL-SCNC: 5 MMOL/L
PROT SERPL-MCNC: 6.9 G/DL
RBC # BLD: 4.41 M/UL
RBC # FLD: 16.6 %
SODIUM SERPL-SCNC: 139 MMOL/L
TRIGL SERPL-MCNC: 78 MG/DL
WBC # FLD AUTO: 5.37 K/UL

## 2025-06-02 ENCOUNTER — APPOINTMENT (OUTPATIENT)
Dept: HEART AND VASCULAR | Facility: CLINIC | Age: 70
End: 2025-06-02

## 2025-06-02 PROCEDURE — 95800 SLP STDY UNATTENDED: CPT | Mod: TC

## 2025-06-09 ENCOUNTER — APPOINTMENT (OUTPATIENT)
Dept: HEART AND VASCULAR | Facility: CLINIC | Age: 70
End: 2025-06-09

## 2025-06-23 PROBLEM — G47.33 SEVERE OBSTRUCTIVE SLEEP APNEA IN ADULT: Status: ACTIVE | Noted: 2025-06-23

## 2025-06-24 RX ORDER — TIRZEPATIDE 2.5 MG/.5ML
2.5 INJECTION, SOLUTION SUBCUTANEOUS
Qty: 1 | Refills: 5 | Status: ACTIVE | COMMUNITY
Start: 2025-06-24 | End: 1900-01-01

## 2025-07-16 ENCOUNTER — APPOINTMENT (OUTPATIENT)
Dept: INTERNAL MEDICINE | Facility: CLINIC | Age: 70
End: 2025-07-16
Payer: MEDICARE

## 2025-07-16 VITALS
BODY MASS INDEX: 34.3 KG/M2 | WEIGHT: 245 LBS | HEART RATE: 61 BPM | OXYGEN SATURATION: 96 % | HEIGHT: 71 IN | TEMPERATURE: 97.2 F | DIASTOLIC BLOOD PRESSURE: 78 MMHG | SYSTOLIC BLOOD PRESSURE: 130 MMHG

## 2025-07-16 PROCEDURE — G2211 COMPLEX E/M VISIT ADD ON: CPT

## 2025-07-16 PROCEDURE — 99214 OFFICE O/P EST MOD 30 MIN: CPT

## 2025-07-28 ENCOUNTER — APPOINTMENT (OUTPATIENT)
Dept: HEART AND VASCULAR | Facility: CLINIC | Age: 70
End: 2025-07-28
Payer: MEDICARE

## 2025-07-28 ENCOUNTER — NON-APPOINTMENT (OUTPATIENT)
Age: 70
End: 2025-07-28

## 2025-07-28 VITALS
DIASTOLIC BLOOD PRESSURE: 87 MMHG | OXYGEN SATURATION: 97 % | BODY MASS INDEX: 34.3 KG/M2 | TEMPERATURE: 98.2 F | HEIGHT: 71 IN | SYSTOLIC BLOOD PRESSURE: 144 MMHG | HEART RATE: 67 BPM | WEIGHT: 245 LBS

## 2025-07-28 PROCEDURE — 93283 PRGRMG EVAL IMPLANTABLE DFB: CPT

## 2025-07-28 PROCEDURE — 99213 OFFICE O/P EST LOW 20 MIN: CPT | Mod: 25

## 2025-08-21 ENCOUNTER — APPOINTMENT (OUTPATIENT)
Dept: HEART AND VASCULAR | Facility: CLINIC | Age: 70
End: 2025-08-21
Payer: MEDICARE

## 2025-08-21 VITALS
TEMPERATURE: 98.4 F | WEIGHT: 249 LBS | HEART RATE: 65 BPM | BODY MASS INDEX: 34.86 KG/M2 | HEIGHT: 71 IN | SYSTOLIC BLOOD PRESSURE: 110 MMHG | DIASTOLIC BLOOD PRESSURE: 70 MMHG | OXYGEN SATURATION: 96 %

## 2025-08-21 DIAGNOSIS — E11.9 TYPE 2 DIABETES MELLITUS W/OUT COMPLICATIONS: ICD-10-CM

## 2025-08-21 PROCEDURE — G2211 COMPLEX E/M VISIT ADD ON: CPT

## 2025-08-21 PROCEDURE — 36415 COLL VENOUS BLD VENIPUNCTURE: CPT

## 2025-08-21 PROCEDURE — 99214 OFFICE O/P EST MOD 30 MIN: CPT

## 2025-08-21 PROCEDURE — 93000 ELECTROCARDIOGRAM COMPLETE: CPT

## 2025-08-21 RX ORDER — TIRZEPATIDE 2.5 MG/.5ML
2.5 INJECTION, SOLUTION SUBCUTANEOUS
Qty: 4 | Refills: 3 | Status: ACTIVE | COMMUNITY
Start: 2025-08-21 | End: 1900-01-01

## 2025-08-22 LAB
ALBUMIN SERPL ELPH-MCNC: 4.1 G/DL
ALBUMIN, RANDOM URINE: <1.2 MG/DL
ALP BLD-CCNC: 70 U/L
ALT SERPL-CCNC: 37 U/L
ANION GAP SERPL CALC-SCNC: 15 MMOL/L
AST SERPL-CCNC: 25 U/L
BASOPHILS # BLD AUTO: 0.05 K/UL
BASOPHILS NFR BLD AUTO: 0.9 %
BILIRUB SERPL-MCNC: 0.7 MG/DL
BUN SERPL-MCNC: 14 MG/DL
CALCIUM SERPL-MCNC: 10.2 MG/DL
CHLORIDE SERPL-SCNC: 101 MMOL/L
CHOLEST SERPL-MCNC: 153 MG/DL
CO2 SERPL-SCNC: 23 MMOL/L
CREAT SERPL-MCNC: 1.15 MG/DL
CREAT SPEC-SCNC: 63 MG/DL
EGFRCR SERPLBLD CKD-EPI 2021: 68 ML/MIN/1.73M2
EOSINOPHIL # BLD AUTO: 0.06 K/UL
EOSINOPHIL NFR BLD AUTO: 1.1 %
ESTIMATED AVERAGE GLUCOSE: 134 MG/DL
GLUCOSE SERPL-MCNC: 88 MG/DL
HBA1C MFR BLD HPLC: 6.3 %
HCT VFR BLD CALC: 44.4 %
HDLC SERPL-MCNC: 44 MG/DL
HGB BLD-MCNC: 15.3 G/DL
IMM GRANULOCYTES NFR BLD AUTO: 0.2 %
LDLC SERPL-MCNC: 86 MG/DL
LYMPHOCYTES # BLD AUTO: 2.53 K/UL
LYMPHOCYTES NFR BLD AUTO: 46.3 %
MAN DIFF?: NORMAL
MCHC RBC-ENTMCNC: 31.8 PG
MCHC RBC-ENTMCNC: 34.5 G/DL
MCV RBC AUTO: 92.3 FL
MICROALBUMIN/CREAT 24H UR-RTO: NORMAL MG/G
MONOCYTES # BLD AUTO: 0.6 K/UL
MONOCYTES NFR BLD AUTO: 11 %
NEUTROPHILS # BLD AUTO: 2.22 K/UL
NEUTROPHILS NFR BLD AUTO: 40.5 %
NONHDLC SERPL-MCNC: 109 MG/DL
NT-PROBNP SERPL-MCNC: 232 PG/ML
PLATELET # BLD AUTO: 295 K/UL
POTASSIUM SERPL-SCNC: 5 MMOL/L
PROT SERPL-MCNC: 7 G/DL
RBC # BLD: 4.81 M/UL
RBC # FLD: 15.2 %
SODIUM SERPL-SCNC: 140 MMOL/L
TRIGL SERPL-MCNC: 131 MG/DL
WBC # FLD AUTO: 5.47 K/UL

## 2025-08-28 ENCOUNTER — RX RENEWAL (OUTPATIENT)
Age: 70
End: 2025-08-28

## 2025-09-02 ENCOUNTER — APPOINTMENT (OUTPATIENT)
Dept: NUCLEAR MEDICINE | Facility: HOSPITAL | Age: 70
End: 2025-09-02

## (undated) DEVICE — SOL IRR BAG NS 0.9% 3000ML

## (undated) DEVICE — WARMING BLANKET UPPER ADULT

## (undated) DEVICE — DRSG AQUACEL 3.5 X 10"

## (undated) DEVICE — WOUND IRR IRRISEPT W 0.5 CHG

## (undated) DEVICE — ELCTR AQUAMANTYS BIPOLAR SEALER 6.0

## (undated) DEVICE — NDL HYPO SAFE 22G X 1.5" (BLACK)

## (undated) DEVICE — DRAPE SHOWER CURTAIN ISOLATION

## (undated) DEVICE — HOOD FLYTE STRYKER HELMET SHIELD

## (undated) DEVICE — FOR-ESU VALLEYLAB T6D04509DX: Type: DURABLE MEDICAL EQUIPMENT

## (undated) DEVICE — DRSG COBAN 6"

## (undated) DEVICE — SAW BLADE STRYKER SAGITTAL DUAL CUT 25X90X1.27MM

## (undated) DEVICE — SYR LUER LOK 20CC

## (undated) DEVICE — VENODYNE/SCD SLEEVE CALF MEDIUM

## (undated) DEVICE — DRAPE IOBAN 33" X 23"

## (undated) DEVICE — DRAPE 1/2 SHEET 40X57"

## (undated) DEVICE — SOL IRR POUR H2O 1500ML

## (undated) DEVICE — Device

## (undated) DEVICE — ELCTR GROUNDING PAD ADULT COVIDIEN

## (undated) DEVICE — DRSG DERMABOND PRINEO 60CM

## (undated) DEVICE — FOR-TOURNIQUET 27679911: Type: DURABLE MEDICAL EQUIPMENT